# Patient Record
Sex: MALE | Race: WHITE | Employment: UNEMPLOYED | URBAN - METROPOLITAN AREA
[De-identification: names, ages, dates, MRNs, and addresses within clinical notes are randomized per-mention and may not be internally consistent; named-entity substitution may affect disease eponyms.]

---

## 2019-01-09 ENCOUNTER — HOSPITAL ENCOUNTER (INPATIENT)
Age: 44
LOS: 4 days | Discharge: HOME | DRG: 885 | End: 2019-01-14
Attending: PSYCHIATRY & NEUROLOGY | Admitting: PSYCHIATRY & NEUROLOGY

## 2019-01-09 DIAGNOSIS — F29 PSYCHOSIS, UNSPECIFIED PSYCHOSIS TYPE (HCC): Primary | ICD-10-CM

## 2019-01-09 LAB
ACETAMINOPHEN LEVEL: <5 UG/ML (ref 10–30)
ALBUMIN SERPL-MCNC: 4.9 G/DL (ref 3.9–4.9)
ALP BLD-CCNC: 67 U/L (ref 35–104)
ALT SERPL-CCNC: 29 U/L (ref 0–41)
AMPHETAMINE SCREEN, URINE: NORMAL
ANION GAP SERPL CALCULATED.3IONS-SCNC: 29 MEQ/L (ref 7–13)
AST SERPL-CCNC: 27 U/L (ref 0–40)
BARBITURATE SCREEN URINE: NORMAL
BASOPHILS ABSOLUTE: 0.1 K/UL (ref 0–0.2)
BASOPHILS RELATIVE PERCENT: 1 %
BENZODIAZEPINE SCREEN, URINE: NORMAL
BILIRUB SERPL-MCNC: 0.6 MG/DL (ref 0–1.2)
BILIRUBIN URINE: NEGATIVE
BLOOD, URINE: NEGATIVE
BUN BLDV-MCNC: 19 MG/DL (ref 6–20)
CALCIUM SERPL-MCNC: 9.7 MG/DL (ref 8.6–10.2)
CANNABINOID SCREEN URINE: NORMAL
CHLORIDE BLD-SCNC: 94 MEQ/L (ref 98–107)
CLARITY: CLEAR
CO2: 15 MEQ/L (ref 22–29)
COCAINE METABOLITE SCREEN URINE: NORMAL
COLOR: YELLOW
CREAT SERPL-MCNC: 1.22 MG/DL (ref 0.7–1.2)
EOSINOPHILS ABSOLUTE: 0 K/UL (ref 0–0.7)
EOSINOPHILS RELATIVE PERCENT: 0 %
ETHANOL PERCENT: NORMAL G/DL
ETHANOL: <10 MG/DL (ref 0–0.08)
GFR AFRICAN AMERICAN: >60
GFR NON-AFRICAN AMERICAN: >60
GLOBULIN: 2.9 G/DL (ref 2.3–3.5)
GLUCOSE BLD-MCNC: 59 MG/DL (ref 74–109)
GLUCOSE BLD-MCNC: 91 MG/DL (ref 60–115)
GLUCOSE URINE: NEGATIVE MG/DL
HCT VFR BLD CALC: 42.4 % (ref 42–52)
HEMOGLOBIN: 14.3 G/DL (ref 14–18)
KETONES, URINE: 40 MG/DL
LEUKOCYTE ESTERASE, URINE: NEGATIVE
LYMPHOCYTES ABSOLUTE: 1.2 K/UL (ref 1–4.8)
LYMPHOCYTES RELATIVE PERCENT: 9.6 %
Lab: NORMAL
MCH RBC QN AUTO: 34.5 PG (ref 27–31.3)
MCHC RBC AUTO-ENTMCNC: 33.6 % (ref 33–37)
MCV RBC AUTO: 102.7 FL (ref 80–100)
MONOCYTES ABSOLUTE: 1.5 K/UL (ref 0.2–0.8)
MONOCYTES RELATIVE PERCENT: 11.8 %
NEUTROPHILS ABSOLUTE: 9.5 K/UL (ref 1.4–6.5)
NEUTROPHILS RELATIVE PERCENT: 77.6 %
NITRITE, URINE: NEGATIVE
OPIATE SCREEN URINE: NORMAL
PDW BLD-RTO: 13.7 % (ref 11.5–14.5)
PERFORMED ON: NORMAL
PH UA: 5 (ref 5–9)
PHENCYCLIDINE SCREEN URINE: NORMAL
PLATELET # BLD: 304 K/UL (ref 130–400)
POTASSIUM SERPL-SCNC: 5.1 MEQ/L (ref 3.5–5.1)
PROTEIN UA: NEGATIVE MG/DL
RBC # BLD: 4.13 M/UL (ref 4.7–6.1)
SALICYLATE, SERUM: <0.3 MG/DL (ref 15–30)
SODIUM BLD-SCNC: 138 MEQ/L (ref 132–144)
SPECIFIC GRAVITY UA: 1.02 (ref 1–1.03)
TOTAL CK: 188 U/L (ref 0–190)
TOTAL PROTEIN: 7.8 G/DL (ref 6.4–8.1)
TSH SERPL DL<=0.05 MIU/L-ACNC: 1.28 UIU/ML (ref 0.27–4.2)
URINE REFLEX TO CULTURE: ABNORMAL
UROBILINOGEN, URINE: 0.2 E.U./DL
WBC # BLD: 12.3 K/UL (ref 4.8–10.8)

## 2019-01-09 PROCEDURE — 99285 EMERGENCY DEPT VISIT HI MDM: CPT

## 2019-01-09 PROCEDURE — 36415 COLL VENOUS BLD VENIPUNCTURE: CPT

## 2019-01-09 PROCEDURE — 84443 ASSAY THYROID STIM HORMONE: CPT

## 2019-01-09 PROCEDURE — 81003 URINALYSIS AUTO W/O SCOPE: CPT

## 2019-01-09 PROCEDURE — 80307 DRUG TEST PRSMV CHEM ANLYZR: CPT

## 2019-01-09 PROCEDURE — G0480 DRUG TEST DEF 1-7 CLASSES: HCPCS

## 2019-01-09 PROCEDURE — 85025 COMPLETE CBC W/AUTO DIFF WBC: CPT

## 2019-01-09 PROCEDURE — 6370000000 HC RX 637 (ALT 250 FOR IP): Performed by: NURSE PRACTITIONER

## 2019-01-09 PROCEDURE — 82550 ASSAY OF CK (CPK): CPT

## 2019-01-09 PROCEDURE — 80053 COMPREHEN METABOLIC PANEL: CPT

## 2019-01-09 RX ORDER — LORAZEPAM 1 MG/1
2 TABLET ORAL ONCE
Status: COMPLETED | OUTPATIENT
Start: 2019-01-09 | End: 2019-01-09

## 2019-01-09 RX ADMIN — LORAZEPAM 2 MG: 1 TABLET ORAL at 19:00

## 2019-01-09 ASSESSMENT — ENCOUNTER SYMPTOMS
SORE THROAT: 0
RHINORRHEA: 0
SHORTNESS OF BREATH: 0
DIARRHEA: 0
COLOR CHANGE: 0
VOMITING: 0
EYE REDNESS: 0
WHEEZING: 0
EYE DISCHARGE: 0
COUGH: 0
EYE PAIN: 0
BACK PAIN: 0
CHEST TIGHTNESS: 0
EYE ITCHING: 0
ABDOMINAL PAIN: 0
TROUBLE SWALLOWING: 0
NAUSEA: 0

## 2019-01-10 ENCOUNTER — APPOINTMENT (OUTPATIENT)
Dept: CT IMAGING | Age: 44
DRG: 885 | End: 2019-01-10

## 2019-01-10 PROBLEM — F29 PSYCHOSIS (HCC): Status: ACTIVE | Noted: 2019-01-10

## 2019-01-10 PROCEDURE — 96372 THER/PROPH/DIAG INJ SC/IM: CPT

## 2019-01-10 PROCEDURE — 1240000000 HC EMOTIONAL WELLNESS R&B

## 2019-01-10 PROCEDURE — 6360000002 HC RX W HCPCS

## 2019-01-10 PROCEDURE — 6370000000 HC RX 637 (ALT 250 FOR IP): Performed by: PSYCHIATRY & NEUROLOGY

## 2019-01-10 PROCEDURE — 6370000000 HC RX 637 (ALT 250 FOR IP): Performed by: NURSE PRACTITIONER

## 2019-01-10 PROCEDURE — 99223 1ST HOSP IP/OBS HIGH 75: CPT | Performed by: PSYCHIATRY & NEUROLOGY

## 2019-01-10 PROCEDURE — 70450 CT HEAD/BRAIN W/O DYE: CPT

## 2019-01-10 RX ORDER — CHLORDIAZEPOXIDE HYDROCHLORIDE 10 MG/1
10 CAPSULE, GELATIN COATED ORAL EVERY 4 HOURS PRN
Status: DISCONTINUED | OUTPATIENT
Start: 2019-01-10 | End: 2019-01-11

## 2019-01-10 RX ORDER — THIAMINE MONONITRATE (VIT B1) 100 MG
100 TABLET ORAL DAILY
Status: DISCONTINUED | OUTPATIENT
Start: 2019-01-10 | End: 2019-01-14 | Stop reason: HOSPADM

## 2019-01-10 RX ORDER — HALOPERIDOL 5 MG/ML
5 INJECTION INTRAMUSCULAR EVERY 6 HOURS PRN
Status: DISCONTINUED | OUTPATIENT
Start: 2019-01-10 | End: 2019-01-14 | Stop reason: HOSPADM

## 2019-01-10 RX ORDER — LORAZEPAM 2 MG/ML
2 INJECTION INTRAMUSCULAR ONCE
Status: COMPLETED | OUTPATIENT
Start: 2019-01-10 | End: 2019-01-10

## 2019-01-10 RX ORDER — MULTIVITAMIN WITH FOLIC ACID 400 MCG
1 TABLET ORAL DAILY
Status: DISCONTINUED | OUTPATIENT
Start: 2019-01-10 | End: 2019-01-14 | Stop reason: HOSPADM

## 2019-01-10 RX ORDER — TRAZODONE HYDROCHLORIDE 50 MG/1
50 TABLET ORAL NIGHTLY PRN
Status: DISCONTINUED | OUTPATIENT
Start: 2019-01-10 | End: 2019-01-14 | Stop reason: HOSPADM

## 2019-01-10 RX ORDER — HYDROXYZINE HYDROCHLORIDE 50 MG/ML
50 INJECTION, SOLUTION INTRAMUSCULAR EVERY 6 HOURS PRN
Status: DISCONTINUED | OUTPATIENT
Start: 2019-01-10 | End: 2019-01-14 | Stop reason: HOSPADM

## 2019-01-10 RX ORDER — CHLORDIAZEPOXIDE HYDROCHLORIDE 25 MG/1
25 CAPSULE, GELATIN COATED ORAL EVERY 4 HOURS PRN
Status: DISCONTINUED | OUTPATIENT
Start: 2019-01-10 | End: 2019-01-11

## 2019-01-10 RX ORDER — CHLORDIAZEPOXIDE HYDROCHLORIDE 25 MG/1
50 CAPSULE, GELATIN COATED ORAL
Status: DISCONTINUED | OUTPATIENT
Start: 2019-01-10 | End: 2019-01-11

## 2019-01-10 RX ORDER — ACETAMINOPHEN 80 MG
TABLET,CHEWABLE ORAL ONCE
Status: COMPLETED | OUTPATIENT
Start: 2019-01-10 | End: 2019-01-10

## 2019-01-10 RX ORDER — CHLORDIAZEPOXIDE HYDROCHLORIDE 25 MG/1
75 CAPSULE, GELATIN COATED ORAL
Status: DISCONTINUED | OUTPATIENT
Start: 2019-01-10 | End: 2019-01-11

## 2019-01-10 RX ORDER — NICOTINE 21 MG/24HR
1 PATCH, TRANSDERMAL 24 HOURS TRANSDERMAL DAILY
Status: DISCONTINUED | OUTPATIENT
Start: 2019-01-10 | End: 2019-01-14 | Stop reason: HOSPADM

## 2019-01-10 RX ORDER — QUETIAPINE FUMARATE 50 MG/1
50 TABLET, FILM COATED ORAL NIGHTLY
Status: DISCONTINUED | OUTPATIENT
Start: 2019-01-10 | End: 2019-01-11

## 2019-01-10 RX ORDER — LORAZEPAM 2 MG/ML
INJECTION INTRAMUSCULAR
Status: COMPLETED
Start: 2019-01-10 | End: 2019-01-10

## 2019-01-10 RX ORDER — HYDROXYZINE PAMOATE 50 MG/1
50 CAPSULE ORAL EVERY 6 HOURS PRN
Status: DISCONTINUED | OUTPATIENT
Start: 2019-01-10 | End: 2019-01-14 | Stop reason: HOSPADM

## 2019-01-10 RX ORDER — FOLIC ACID 1 MG/1
1 TABLET ORAL DAILY
Status: DISCONTINUED | OUTPATIENT
Start: 2019-01-10 | End: 2019-01-14 | Stop reason: HOSPADM

## 2019-01-10 RX ORDER — SODIUM CHLORIDE 0.9 % (FLUSH) 0.9 %
10 SYRINGE (ML) INJECTION EVERY 12 HOURS SCHEDULED
Status: DISCONTINUED | OUTPATIENT
Start: 2019-01-10 | End: 2019-01-10

## 2019-01-10 RX ORDER — SODIUM CHLORIDE 0.9 % (FLUSH) 0.9 %
10 SYRINGE (ML) INJECTION PRN
Status: DISCONTINUED | OUTPATIENT
Start: 2019-01-10 | End: 2019-01-10

## 2019-01-10 RX ORDER — LORAZEPAM 1 MG/1
4 TABLET ORAL
Status: DISCONTINUED | OUTPATIENT
Start: 2019-01-10 | End: 2019-01-11

## 2019-01-10 RX ORDER — HALOPERIDOL 5 MG
5 TABLET ORAL EVERY 6 HOURS PRN
Status: DISCONTINUED | OUTPATIENT
Start: 2019-01-10 | End: 2019-01-14 | Stop reason: HOSPADM

## 2019-01-10 RX ORDER — ONDANSETRON 2 MG/ML
4 INJECTION INTRAMUSCULAR; INTRAVENOUS EVERY 6 HOURS PRN
Status: DISCONTINUED | OUTPATIENT
Start: 2019-01-10 | End: 2019-01-14 | Stop reason: HOSPADM

## 2019-01-10 RX ADMIN — LORAZEPAM 2 MG: 2 INJECTION INTRAMUSCULAR at 04:57

## 2019-01-10 RX ADMIN — QUETIAPINE FUMARATE 50 MG: 50 TABLET ORAL at 20:38

## 2019-01-10 RX ADMIN — Medication: at 12:18

## 2019-01-10 RX ADMIN — CHLORDIAZEPOXIDE HYDROCHLORIDE 10 MG: 10 CAPSULE ORAL at 09:35

## 2019-01-10 RX ADMIN — LORAZEPAM 2 MG: 2 INJECTION, SOLUTION INTRAMUSCULAR; INTRAVENOUS at 04:57

## 2019-01-10 RX ADMIN — HYDROXYZINE PAMOATE 50 MG: 50 CAPSULE ORAL at 13:07

## 2019-01-10 RX ADMIN — METOPROLOL TARTRATE 12.5 MG: 25 TABLET ORAL at 20:42

## 2019-01-10 ASSESSMENT — LIFESTYLE VARIABLES: HISTORY_ALCOHOL_USE: NO

## 2019-01-10 ASSESSMENT — SLEEP AND FATIGUE QUESTIONNAIRES
SLEEP PATTERN: INSOMNIA
DO YOU USE A SLEEP AID: NO
AVERAGE NUMBER OF SLEEP HOURS: 0
DO YOU HAVE DIFFICULTY SLEEPING: YES

## 2019-01-10 ASSESSMENT — PATIENT HEALTH QUESTIONNAIRE - PHQ9: SUM OF ALL RESPONSES TO PHQ QUESTIONS 1-9: 9

## 2019-01-11 LAB
ANION GAP SERPL CALCULATED.3IONS-SCNC: 16 MEQ/L (ref 7–13)
BUN BLDV-MCNC: 10 MG/DL (ref 6–20)
CALCIUM SERPL-MCNC: 8.8 MG/DL (ref 8.6–10.2)
CHLORIDE BLD-SCNC: 96 MEQ/L (ref 98–107)
CO2: 23 MEQ/L (ref 22–29)
CREAT SERPL-MCNC: 0.8 MG/DL (ref 0.7–1.2)
GFR AFRICAN AMERICAN: >60
GFR NON-AFRICAN AMERICAN: >60
GLUCOSE BLD-MCNC: 82 MG/DL (ref 74–109)
HCT VFR BLD CALC: 41.5 % (ref 42–52)
HEMOGLOBIN: 14.6 G/DL (ref 14–18)
MCH RBC QN AUTO: 35.7 PG (ref 27–31.3)
MCHC RBC AUTO-ENTMCNC: 35.2 % (ref 33–37)
MCV RBC AUTO: 101.4 FL (ref 80–100)
PDW BLD-RTO: 13.6 % (ref 11.5–14.5)
PLATELET # BLD: 284 K/UL (ref 130–400)
POTASSIUM SERPL-SCNC: 5 MEQ/L (ref 3.5–5.1)
RBC # BLD: 4.1 M/UL (ref 4.7–6.1)
SODIUM BLD-SCNC: 135 MEQ/L (ref 132–144)
WBC # BLD: 7.1 K/UL (ref 4.8–10.8)

## 2019-01-11 PROCEDURE — 36415 COLL VENOUS BLD VENIPUNCTURE: CPT

## 2019-01-11 PROCEDURE — 80048 BASIC METABOLIC PNL TOTAL CA: CPT

## 2019-01-11 PROCEDURE — 6370000000 HC RX 637 (ALT 250 FOR IP): Performed by: PSYCHIATRY & NEUROLOGY

## 2019-01-11 PROCEDURE — 85027 COMPLETE CBC AUTOMATED: CPT

## 2019-01-11 PROCEDURE — 6370000000 HC RX 637 (ALT 250 FOR IP): Performed by: NURSE PRACTITIONER

## 2019-01-11 PROCEDURE — 99232 SBSQ HOSP IP/OBS MODERATE 35: CPT | Performed by: PSYCHIATRY & NEUROLOGY

## 2019-01-11 PROCEDURE — 1240000000 HC EMOTIONAL WELLNESS R&B

## 2019-01-11 RX ORDER — QUETIAPINE FUMARATE 100 MG/1
100 TABLET, FILM COATED ORAL NIGHTLY
Status: DISCONTINUED | OUTPATIENT
Start: 2019-01-11 | End: 2019-01-14 | Stop reason: HOSPADM

## 2019-01-11 RX ADMIN — FOLIC ACID 1 MG: 1 TABLET ORAL at 10:04

## 2019-01-11 RX ADMIN — Medication 100 MG: at 10:04

## 2019-01-11 RX ADMIN — METOPROLOL TARTRATE 12.5 MG: 25 TABLET ORAL at 10:05

## 2019-01-11 RX ADMIN — QUETIAPINE FUMARATE 100 MG: 100 TABLET ORAL at 21:29

## 2019-01-11 RX ADMIN — METOPROLOL TARTRATE 12.5 MG: 25 TABLET ORAL at 21:28

## 2019-01-11 RX ADMIN — TRAZODONE HYDROCHLORIDE 50 MG: 50 TABLET ORAL at 21:29

## 2019-01-11 RX ADMIN — THERA TABS 1 TABLET: TAB at 10:05

## 2019-01-12 LAB
EKG ATRIAL RATE: 82 BPM
EKG P AXIS: 69 DEGREES
EKG P-R INTERVAL: 108 MS
EKG Q-T INTERVAL: 374 MS
EKG QRS DURATION: 90 MS
EKG QTC CALCULATION (BAZETT): 436 MS
EKG R AXIS: 23 DEGREES
EKG T AXIS: 34 DEGREES
EKG VENTRICULAR RATE: 82 BPM

## 2019-01-12 PROCEDURE — 93005 ELECTROCARDIOGRAM TRACING: CPT

## 2019-01-12 PROCEDURE — 6370000000 HC RX 637 (ALT 250 FOR IP): Performed by: NURSE PRACTITIONER

## 2019-01-12 PROCEDURE — 6370000000 HC RX 637 (ALT 250 FOR IP): Performed by: PSYCHIATRY & NEUROLOGY

## 2019-01-12 PROCEDURE — 1240000000 HC EMOTIONAL WELLNESS R&B

## 2019-01-12 RX ADMIN — THERA TABS 1 TABLET: TAB at 10:03

## 2019-01-12 RX ADMIN — METOPROLOL TARTRATE 12.5 MG: 25 TABLET ORAL at 10:04

## 2019-01-12 RX ADMIN — METOPROLOL TARTRATE 12.5 MG: 25 TABLET ORAL at 21:48

## 2019-01-12 RX ADMIN — Medication 100 MG: at 10:04

## 2019-01-12 RX ADMIN — FOLIC ACID 1 MG: 1 TABLET ORAL at 10:04

## 2019-01-12 RX ADMIN — QUETIAPINE FUMARATE 100 MG: 100 TABLET ORAL at 21:49

## 2019-01-13 PROCEDURE — 6370000000 HC RX 637 (ALT 250 FOR IP): Performed by: PSYCHIATRY & NEUROLOGY

## 2019-01-13 PROCEDURE — 1240000000 HC EMOTIONAL WELLNESS R&B

## 2019-01-13 PROCEDURE — 6370000000 HC RX 637 (ALT 250 FOR IP): Performed by: NURSE PRACTITIONER

## 2019-01-13 RX ADMIN — FOLIC ACID 1 MG: 1 TABLET ORAL at 09:06

## 2019-01-13 RX ADMIN — QUETIAPINE FUMARATE 100 MG: 100 TABLET ORAL at 21:01

## 2019-01-13 RX ADMIN — THERA TABS 1 TABLET: TAB at 09:04

## 2019-01-13 RX ADMIN — Medication 100 MG: at 09:03

## 2019-01-13 RX ADMIN — TRAZODONE HYDROCHLORIDE 50 MG: 50 TABLET ORAL at 21:01

## 2019-01-13 RX ADMIN — METOPROLOL TARTRATE 12.5 MG: 25 TABLET ORAL at 21:01

## 2019-01-13 RX ADMIN — METOPROLOL TARTRATE 12.5 MG: 25 TABLET ORAL at 09:04

## 2019-01-14 VITALS
HEART RATE: 93 BPM | TEMPERATURE: 99 F | HEIGHT: 69 IN | SYSTOLIC BLOOD PRESSURE: 138 MMHG | DIASTOLIC BLOOD PRESSURE: 97 MMHG | OXYGEN SATURATION: 100 % | RESPIRATION RATE: 18 BRPM | BODY MASS INDEX: 23.7 KG/M2 | WEIGHT: 160 LBS

## 2019-01-14 PROCEDURE — 6370000000 HC RX 637 (ALT 250 FOR IP): Performed by: NURSE PRACTITIONER

## 2019-01-14 PROCEDURE — 99239 HOSP IP/OBS DSCHRG MGMT >30: CPT | Performed by: PSYCHIATRY & NEUROLOGY

## 2019-01-14 PROCEDURE — 93010 ELECTROCARDIOGRAM REPORT: CPT | Performed by: INTERNAL MEDICINE

## 2019-01-14 PROCEDURE — 6370000000 HC RX 637 (ALT 250 FOR IP): Performed by: PSYCHIATRY & NEUROLOGY

## 2019-01-14 RX ORDER — QUETIAPINE FUMARATE 100 MG/1
100 TABLET, FILM COATED ORAL NIGHTLY
Qty: 30 TABLET | Refills: 1 | Status: ON HOLD | OUTPATIENT
Start: 2019-01-14 | End: 2019-10-01 | Stop reason: HOSPADM

## 2019-01-14 RX ORDER — QUETIAPINE FUMARATE 100 MG/1
100 TABLET, FILM COATED ORAL NIGHTLY
Qty: 14 TABLET | Refills: 0 | Status: SHIPPED | OUTPATIENT
Start: 2019-01-14 | End: 2019-01-14

## 2019-01-14 RX ADMIN — THERA TABS 1 TABLET: TAB at 09:55

## 2019-01-14 RX ADMIN — Medication 100 MG: at 09:55

## 2019-01-14 RX ADMIN — METOPROLOL TARTRATE 12.5 MG: 25 TABLET ORAL at 09:55

## 2019-01-14 RX ADMIN — FOLIC ACID 1 MG: 1 TABLET ORAL at 09:55

## 2019-07-25 ENCOUNTER — APPOINTMENT (OUTPATIENT)
Dept: CT IMAGING | Age: 44
DRG: 207 | End: 2019-07-25

## 2019-07-25 ENCOUNTER — APPOINTMENT (OUTPATIENT)
Dept: GENERAL RADIOLOGY | Age: 44
DRG: 207 | End: 2019-07-25

## 2019-07-25 ENCOUNTER — HOSPITAL ENCOUNTER (INPATIENT)
Age: 44
LOS: 8 days | Discharge: HOME | DRG: 207 | End: 2019-08-02
Attending: STUDENT IN AN ORGANIZED HEALTH CARE EDUCATION/TRAINING PROGRAM | Admitting: INTERNAL MEDICINE

## 2019-07-25 DIAGNOSIS — F10.20 CHRONIC ALCOHOLISM (HCC): ICD-10-CM

## 2019-07-25 DIAGNOSIS — R45.851 SUICIDAL IDEATION: ICD-10-CM

## 2019-07-25 DIAGNOSIS — R40.2432 GLASGOW COMA SCALE TOTAL SCORE 3-8, AT ARRIVAL TO EMERGENCY DEPARTMENT (HCC): Primary | ICD-10-CM

## 2019-07-25 DIAGNOSIS — S00.81XA FACIAL ABRASION, INITIAL ENCOUNTER: ICD-10-CM

## 2019-07-25 DIAGNOSIS — F10.929 ACUTE ALCOHOLIC INTOXICATION WITH COMPLICATION (HCC): ICD-10-CM

## 2019-07-25 DIAGNOSIS — R45.850 HOMICIDAL IDEATION: ICD-10-CM
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
## 2019-07-25 DIAGNOSIS — S09.90XA CLOSED HEAD INJURY, INITIAL ENCOUNTER: ICD-10-CM

## 2019-07-25 PROBLEM — F10.229 ALCOHOL INTOXICATION IN ALCOHOLISM WITH BLOOD LEVEL OVER 0.3 WITH COMPLICATION (HCC): Status: ACTIVE | Noted: 2019-07-25

## 2019-07-25 LAB
ACETAMINOPHEN LEVEL: <5 UG/ML (ref 10–30)
ALBUMIN SERPL-MCNC: 4 G/DL (ref 3.5–4.6)
ALP BLD-CCNC: 81 U/L (ref 35–104)
ALT SERPL-CCNC: 9 U/L (ref 0–41)
AMPHETAMINE SCREEN, URINE: NORMAL
ANION GAP SERPL CALCULATED.3IONS-SCNC: 14 MEQ/L (ref 9–15)
ANION GAP SERPL CALCULATED.3IONS-SCNC: 14 MEQ/L (ref 9–15)
APTT: 28 SEC (ref 24.4–36.8)
AST SERPL-CCNC: 16 U/L (ref 0–40)
BARBITURATE SCREEN URINE: NORMAL
BASE EXCESS ARTERIAL: -1 (ref -3–3)
BASOPHILS ABSOLUTE: 0.1 K/UL (ref 0–0.2)
BASOPHILS ABSOLUTE: 0.1 K/UL (ref 0–0.2)
BASOPHILS RELATIVE PERCENT: 0.7 %
BASOPHILS RELATIVE PERCENT: 1.1 %
BENZODIAZEPINE SCREEN, URINE: NORMAL
BILIRUB SERPL-MCNC: <0.2 MG/DL (ref 0.2–0.7)
BILIRUBIN URINE: NEGATIVE
BLOOD, URINE: NEGATIVE
BUN BLDV-MCNC: 8 MG/DL (ref 6–20)
BUN BLDV-MCNC: 9 MG/DL (ref 6–20)
CALCIUM IONIZED: 1.04 MMOL/L (ref 1.12–1.32)
CALCIUM SERPL-MCNC: 7.8 MG/DL (ref 8.5–9.9)
CALCIUM SERPL-MCNC: 8.3 MG/DL (ref 8.5–9.9)
CANNABINOID SCREEN URINE: NORMAL
CHLORIDE BLD-SCNC: 108 MEQ/L (ref 95–107)
CHLORIDE BLD-SCNC: 108 MEQ/L (ref 95–107)
CK MB: 2.8 NG/ML (ref 0–6.7)
CLARITY: CLEAR
CO2: 25 MEQ/L (ref 20–31)
CO2: 26 MEQ/L (ref 20–31)
COCAINE METABOLITE SCREEN URINE: NORMAL
COLOR: YELLOW
CREAT SERPL-MCNC: 0.7 MG/DL (ref 0.7–1.2)
CREAT SERPL-MCNC: 0.76 MG/DL (ref 0.7–1.2)
CREATINE KINASE-MB INDEX: 2.9 % (ref 0–3.5)
EKG ATRIAL RATE: 94 BPM
EKG P AXIS: 75 DEGREES
EKG P-R INTERVAL: 114 MS
EKG Q-T INTERVAL: 376 MS
EKG QRS DURATION: 92 MS
EKG QTC CALCULATION (BAZETT): 470 MS
EKG R AXIS: 19 DEGREES
EKG T AXIS: 31 DEGREES
EKG VENTRICULAR RATE: 94 BPM
EOSINOPHILS ABSOLUTE: 0.1 K/UL (ref 0–0.7)
EOSINOPHILS ABSOLUTE: 0.1 K/UL (ref 0–0.7)
EOSINOPHILS RELATIVE PERCENT: 1.3 %
EOSINOPHILS RELATIVE PERCENT: 1.8 %
ETHANOL PERCENT: 0.36 G/DL
ETHANOL: 411 MG/DL (ref 0–0.08)
GFR AFRICAN AMERICAN: >60
GFR NON-AFRICAN AMERICAN: >60
GLOBULIN: 2.6 G/DL (ref 2.3–3.5)
GLUCOSE BLD-MCNC: 113 MG/DL (ref 70–99)
GLUCOSE BLD-MCNC: 273 MG/DL (ref 60–115)
GLUCOSE BLD-MCNC: 84 MG/DL (ref 70–99)
GLUCOSE URINE: NEGATIVE MG/DL
HCO3 ARTERIAL: 26 MMOL/L (ref 21–29)
HCT VFR BLD CALC: 43.9 % (ref 42–52)
HCT VFR BLD CALC: 44.5 % (ref 42–52)
HEMOGLOBIN: 14.4 GM/DL (ref 13.5–17.5)
HEMOGLOBIN: 15.1 G/DL (ref 14–18)
HEMOGLOBIN: 15.2 G/DL (ref 14–18)
INR BLD: 0.9
KETONES, URINE: NEGATIVE MG/DL
LACTATE: 1.3 MMOL/L (ref 0.4–2)
LACTIC ACID: 1.7 MMOL/L (ref 0.5–2.2)
LEUKOCYTE ESTERASE, URINE: NEGATIVE
LYMPHOCYTES ABSOLUTE: 1.8 K/UL (ref 1–4.8)
LYMPHOCYTES ABSOLUTE: 2.5 K/UL (ref 1–4.8)
LYMPHOCYTES RELATIVE PERCENT: 19.7 %
LYMPHOCYTES RELATIVE PERCENT: 34 %
Lab: NORMAL
MAGNESIUM: 2.2 MG/DL (ref 1.7–2.4)
MCH RBC QN AUTO: 34.4 PG (ref 27–31.3)
MCH RBC QN AUTO: 34.5 PG (ref 27–31.3)
MCHC RBC AUTO-ENTMCNC: 34.1 % (ref 33–37)
MCHC RBC AUTO-ENTMCNC: 34.4 % (ref 33–37)
MCV RBC AUTO: 100.2 FL (ref 80–100)
MCV RBC AUTO: 100.7 FL (ref 80–100)
MONOCYTES ABSOLUTE: 0.6 K/UL (ref 0.2–0.8)
MONOCYTES ABSOLUTE: 0.7 K/UL (ref 0.2–0.8)
MONOCYTES RELATIVE PERCENT: 6.7 %
MONOCYTES RELATIVE PERCENT: 9 %
NEUTROPHILS ABSOLUTE: 4 K/UL (ref 1.4–6.5)
NEUTROPHILS ABSOLUTE: 6.7 K/UL (ref 1.4–6.5)
NEUTROPHILS RELATIVE PERCENT: 54.1 %
NEUTROPHILS RELATIVE PERCENT: 71.6 %
NITRITE, URINE: NEGATIVE
O2 SAT, ARTERIAL: 100 % (ref 93–100)
OPIATE SCREEN URINE: NORMAL
PCO2 ARTERIAL: 57 MM HG (ref 35–45)
PDW BLD-RTO: 13.9 % (ref 11.5–14.5)
PDW BLD-RTO: 14.1 % (ref 11.5–14.5)
PERFORMED ON: ABNORMAL
PH ARTERIAL: 7.27 (ref 7.35–7.45)
PH UA: 5 (ref 5–9)
PHENCYCLIDINE SCREEN URINE: NORMAL
PLATELET # BLD: 235 K/UL (ref 130–400)
PLATELET # BLD: 258 K/UL (ref 130–400)
PO2 ARTERIAL: 242 MM HG (ref 75–108)
POC CHLORIDE: 115 MEQ/L (ref 99–110)
POC CREATININE: 0.7 MG/DL (ref 0.9–1.3)
POC FIO2: 50
POC HEMATOCRIT: 42 % (ref 41–53)
POC POTASSIUM: 3.7 MEQ/L (ref 3.5–5.1)
POC SAMPLE TYPE: ABNORMAL
POC SODIUM: 143 MEQ/L (ref 136–145)
POTASSIUM SERPL-SCNC: 4 MEQ/L (ref 3.4–4.9)
POTASSIUM SERPL-SCNC: 4.4 MEQ/L (ref 3.4–4.9)
PROTEIN UA: NEGATIVE MG/DL
PROTHROMBIN TIME: 12.2 SEC (ref 12.3–14.9)
RBC # BLD: 4.38 M/UL (ref 4.7–6.1)
RBC # BLD: 4.42 M/UL (ref 4.7–6.1)
SALICYLATE, SERUM: <0.3 MG/DL (ref 15–30)
SODIUM BLD-SCNC: 147 MEQ/L (ref 135–144)
SODIUM BLD-SCNC: 148 MEQ/L (ref 135–144)
SPECIFIC GRAVITY UA: 1 (ref 1–1.03)
TCO2 ARTERIAL: 28 (ref 22–29)
TOTAL CK: 96 U/L (ref 0–190)
TOTAL PROTEIN: 6.6 G/DL (ref 6.3–8)
TROPONIN: <0.01 NG/ML (ref 0–0.01)
TSH SERPL DL<=0.05 MIU/L-ACNC: 1.16 UIU/ML (ref 0.44–3.86)
URINE REFLEX TO CULTURE: NORMAL
UROBILINOGEN, URINE: 0.2 E.U./DL
WBC # BLD: 7.4 K/UL (ref 4.8–10.8)
WBC # BLD: 9.3 K/UL (ref 4.8–10.8)

## 2019-07-25 PROCEDURE — 85014 HEMATOCRIT: CPT

## 2019-07-25 PROCEDURE — 84443 ASSAY THYROID STIM HORMONE: CPT

## 2019-07-25 PROCEDURE — 96365 THER/PROPH/DIAG IV INF INIT: CPT

## 2019-07-25 PROCEDURE — 71045 X-RAY EXAM CHEST 1 VIEW: CPT

## 2019-07-25 PROCEDURE — 6360000002 HC RX W HCPCS: Performed by: INTERNAL MEDICINE

## 2019-07-25 PROCEDURE — 6360000002 HC RX W HCPCS: Performed by: STUDENT IN AN ORGANIZED HEALTH CARE EDUCATION/TRAINING PROGRAM

## 2019-07-25 PROCEDURE — 84484 ASSAY OF TROPONIN QUANT: CPT

## 2019-07-25 PROCEDURE — 72131 CT LUMBAR SPINE W/O DYE: CPT

## 2019-07-25 PROCEDURE — 72125 CT NECK SPINE W/O DYE: CPT

## 2019-07-25 PROCEDURE — 99152 MOD SED SAME PHYS/QHP 5/>YRS: CPT

## 2019-07-25 PROCEDURE — 85610 PROTHROMBIN TIME: CPT

## 2019-07-25 PROCEDURE — 81003 URINALYSIS AUTO W/O SCOPE: CPT

## 2019-07-25 PROCEDURE — 99285 EMERGENCY DEPT VISIT HI MDM: CPT

## 2019-07-25 PROCEDURE — 2580000003 HC RX 258: Performed by: STUDENT IN AN ORGANIZED HEALTH CARE EDUCATION/TRAINING PROGRAM

## 2019-07-25 PROCEDURE — 99255 IP/OBS CONSLTJ NEW/EST HI 80: CPT | Performed by: PHYSICIAN ASSISTANT

## 2019-07-25 PROCEDURE — 72128 CT CHEST SPINE W/O DYE: CPT

## 2019-07-25 PROCEDURE — 82550 ASSAY OF CK (CPK): CPT

## 2019-07-25 PROCEDURE — 74177 CT ABD & PELVIS W/CONTRAST: CPT

## 2019-07-25 PROCEDURE — 31500 INSERT EMERGENCY AIRWAY: CPT

## 2019-07-25 PROCEDURE — 80053 COMPREHEN METABOLIC PANEL: CPT

## 2019-07-25 PROCEDURE — 5A1955Z RESPIRATORY VENTILATION, GREATER THAN 96 CONSECUTIVE HOURS: ICD-10-PCS | Performed by: STUDENT IN AN ORGANIZED HEALTH CARE EDUCATION/TRAINING PROGRAM

## 2019-07-25 PROCEDURE — 36600 WITHDRAWAL OF ARTERIAL BLOOD: CPT

## 2019-07-25 PROCEDURE — 83735 ASSAY OF MAGNESIUM: CPT

## 2019-07-25 PROCEDURE — 0BH17EZ INSERTION OF ENDOTRACHEAL AIRWAY INTO TRACHEA, VIA NATURAL OR ARTIFICIAL OPENING: ICD-10-PCS | Performed by: STUDENT IN AN ORGANIZED HEALTH CARE EDUCATION/TRAINING PROGRAM

## 2019-07-25 PROCEDURE — 82565 ASSAY OF CREATININE: CPT

## 2019-07-25 PROCEDURE — 6360000002 HC RX W HCPCS: Performed by: NURSE PRACTITIONER

## 2019-07-25 PROCEDURE — 82435 ASSAY OF BLOOD CHLORIDE: CPT

## 2019-07-25 PROCEDURE — 70486 CT MAXILLOFACIAL W/O DYE: CPT

## 2019-07-25 PROCEDURE — 93005 ELECTROCARDIOGRAM TRACING: CPT | Performed by: STUDENT IN AN ORGANIZED HEALTH CARE EDUCATION/TRAINING PROGRAM

## 2019-07-25 PROCEDURE — 82330 ASSAY OF CALCIUM: CPT

## 2019-07-25 PROCEDURE — 84132 ASSAY OF SERUM POTASSIUM: CPT

## 2019-07-25 PROCEDURE — 83605 ASSAY OF LACTIC ACID: CPT

## 2019-07-25 PROCEDURE — 85730 THROMBOPLASTIN TIME PARTIAL: CPT

## 2019-07-25 PROCEDURE — 2500000003 HC RX 250 WO HCPCS: Performed by: NURSE PRACTITIONER

## 2019-07-25 PROCEDURE — 84295 ASSAY OF SERUM SODIUM: CPT

## 2019-07-25 PROCEDURE — 96361 HYDRATE IV INFUSION ADD-ON: CPT

## 2019-07-25 PROCEDURE — 94002 VENT MGMT INPAT INIT DAY: CPT

## 2019-07-25 PROCEDURE — 70450 CT HEAD/BRAIN W/O DYE: CPT

## 2019-07-25 PROCEDURE — 85025 COMPLETE CBC W/AUTO DIFF WBC: CPT

## 2019-07-25 PROCEDURE — 82553 CREATINE MB FRACTION: CPT

## 2019-07-25 PROCEDURE — 6370000000 HC RX 637 (ALT 250 FOR IP): Performed by: INTERNAL MEDICINE

## 2019-07-25 PROCEDURE — 80307 DRUG TEST PRSMV CHEM ANLYZR: CPT

## 2019-07-25 PROCEDURE — 2500000003 HC RX 250 WO HCPCS: Performed by: STUDENT IN AN ORGANIZED HEALTH CARE EDUCATION/TRAINING PROGRAM

## 2019-07-25 PROCEDURE — G0480 DRUG TEST DEF 1-7 CLASSES: HCPCS

## 2019-07-25 PROCEDURE — 2000000000 HC ICU R&B

## 2019-07-25 PROCEDURE — 71260 CT THORAX DX C+: CPT

## 2019-07-25 PROCEDURE — 82803 BLOOD GASES ANY COMBINATION: CPT

## 2019-07-25 PROCEDURE — 2700000000 HC OXYGEN THERAPY PER DAY

## 2019-07-25 PROCEDURE — 6360000004 HC RX CONTRAST MEDICATION: Performed by: STUDENT IN AN ORGANIZED HEALTH CARE EDUCATION/TRAINING PROGRAM

## 2019-07-25 PROCEDURE — 2580000003 HC RX 258

## 2019-07-25 PROCEDURE — 2580000003 HC RX 258: Performed by: INTERNAL MEDICINE

## 2019-07-25 PROCEDURE — 51702 INSERT TEMP BLADDER CATH: CPT

## 2019-07-25 PROCEDURE — 36415 COLL VENOUS BLD VENIPUNCTURE: CPT

## 2019-07-25 PROCEDURE — 2500000003 HC RX 250 WO HCPCS: Performed by: INTERNAL MEDICINE

## 2019-07-25 RX ORDER — KETAMINE HYDROCHLORIDE 50 MG/ML
4 INJECTION, SOLUTION, CONCENTRATE INTRAMUSCULAR; INTRAVENOUS ONCE
Status: COMPLETED | OUTPATIENT
Start: 2019-07-25 | End: 2019-07-25

## 2019-07-25 RX ORDER — SODIUM CHLORIDE 0.9 % (FLUSH) 0.9 %
10 SYRINGE (ML) INJECTION EVERY 12 HOURS SCHEDULED
Status: DISCONTINUED | OUTPATIENT
Start: 2019-07-25 | End: 2019-07-26 | Stop reason: ALTCHOICE

## 2019-07-25 RX ORDER — POTASSIUM CHLORIDE 7.45 MG/ML
10 INJECTION INTRAVENOUS PRN
Status: DISCONTINUED | OUTPATIENT
Start: 2019-07-25 | End: 2019-08-02 | Stop reason: HOSPADM

## 2019-07-25 RX ORDER — PROPOFOL 10 MG/ML
10 INJECTION, EMULSION INTRAVENOUS ONCE
Status: COMPLETED | OUTPATIENT
Start: 2019-07-25 | End: 2019-07-25

## 2019-07-25 RX ORDER — POTASSIUM CHLORIDE 20 MEQ/1
40 TABLET, EXTENDED RELEASE ORAL PRN
Status: DISCONTINUED | OUTPATIENT
Start: 2019-07-25 | End: 2019-08-02 | Stop reason: HOSPADM

## 2019-07-25 RX ORDER — PROPOFOL 10 MG/ML
INJECTION, EMULSION INTRAVENOUS
Status: COMPLETED
Start: 2019-07-25 | End: 2019-07-26

## 2019-07-25 RX ORDER — SODIUM CHLORIDE 0.9 % (FLUSH) 0.9 %
10 SYRINGE (ML) INJECTION PRN
Status: DISCONTINUED | OUTPATIENT
Start: 2019-07-25 | End: 2019-07-26 | Stop reason: ALTCHOICE

## 2019-07-25 RX ORDER — CHLORHEXIDINE GLUCONATE 0.12 MG/ML
15 RINSE ORAL 2 TIMES DAILY
Status: DISCONTINUED | OUTPATIENT
Start: 2019-07-25 | End: 2019-08-02 | Stop reason: HOSPADM

## 2019-07-25 RX ORDER — PROPOFOL 10 MG/ML
10 INJECTION, EMULSION INTRAVENOUS
Status: DISCONTINUED | OUTPATIENT
Start: 2019-07-25 | End: 2019-07-30

## 2019-07-25 RX ORDER — PROPOFOL 10 MG/ML
200 INJECTION, EMULSION INTRAVENOUS ONCE
Status: COMPLETED | OUTPATIENT
Start: 2019-07-25 | End: 2019-07-25

## 2019-07-25 RX ORDER — SODIUM CHLORIDE 9 MG/ML
INJECTION, SOLUTION INTRAVENOUS
Status: COMPLETED
Start: 2019-07-25 | End: 2019-07-25

## 2019-07-25 RX ORDER — ONDANSETRON 2 MG/ML
4 INJECTION INTRAMUSCULAR; INTRAVENOUS EVERY 6 HOURS PRN
Status: DISCONTINUED | OUTPATIENT
Start: 2019-07-25 | End: 2019-08-02 | Stop reason: HOSPADM

## 2019-07-25 RX ORDER — ETOMIDATE 2 MG/ML
20 INJECTION INTRAVENOUS ONCE
Status: COMPLETED | OUTPATIENT
Start: 2019-07-25 | End: 2019-07-25

## 2019-07-25 RX ORDER — POTASSIUM CHLORIDE 1.5 G/1.77G
40 POWDER, FOR SOLUTION ORAL PRN
Status: DISCONTINUED | OUTPATIENT
Start: 2019-07-25 | End: 2019-08-02 | Stop reason: HOSPADM

## 2019-07-25 RX ORDER — ACETAMINOPHEN 650 MG/1
650 SUPPOSITORY RECTAL EVERY 4 HOURS PRN
Status: DISCONTINUED | OUTPATIENT
Start: 2019-07-25 | End: 2019-08-02 | Stop reason: HOSPADM

## 2019-07-25 RX ORDER — LABETALOL HYDROCHLORIDE 5 MG/ML
5 INJECTION, SOLUTION INTRAVENOUS EVERY 4 HOURS PRN
Status: DISCONTINUED | OUTPATIENT
Start: 2019-07-25 | End: 2019-07-27

## 2019-07-25 RX ORDER — ROCURONIUM BROMIDE 10 MG/ML
50 INJECTION, SOLUTION INTRAVENOUS ONCE
Status: COMPLETED | OUTPATIENT
Start: 2019-07-25 | End: 2019-07-25

## 2019-07-25 RX ADMIN — PROPOFOL 10 MCG/KG/MIN: 10 INJECTION, EMULSION INTRAVENOUS at 13:25

## 2019-07-25 RX ADMIN — PROPOFOL 200 MG: 10 INJECTION, EMULSION INTRAVENOUS at 13:16

## 2019-07-25 RX ADMIN — KETAMINE HYDROCHLORIDE 290 MG: 50 INJECTION, SOLUTION INTRAMUSCULAR; INTRAVENOUS at 13:26

## 2019-07-25 RX ADMIN — FAMOTIDINE 20 MG: 10 INJECTION, SOLUTION INTRAVENOUS at 20:40

## 2019-07-25 RX ADMIN — ENOXAPARIN SODIUM 40 MG: 40 INJECTION SUBCUTANEOUS at 20:44

## 2019-07-25 RX ADMIN — PROPOFOL 50 MCG/KG/MIN: 10 INJECTION, EMULSION INTRAVENOUS at 22:43

## 2019-07-25 RX ADMIN — CHLORHEXIDINE GLUCONATE 0.12% ORAL RINSE 15 ML: 1.2 LIQUID ORAL at 20:40

## 2019-07-25 RX ADMIN — ROCURONIUM BROMIDE 50 MG: 10 INJECTION INTRAVENOUS at 12:39

## 2019-07-25 RX ADMIN — DEXMEDETOMIDINE 0.2 MCG/KG/HR: 100 INJECTION, SOLUTION, CONCENTRATE INTRAVENOUS at 19:38

## 2019-07-25 RX ADMIN — SODIUM CHLORIDE: 9 INJECTION, SOLUTION INTRAVENOUS at 13:26

## 2019-07-25 RX ADMIN — ETOMIDATE 20 MG: 2 INJECTION, SOLUTION INTRAVENOUS at 12:39

## 2019-07-25 RX ADMIN — FOLIC ACID: 5 INJECTION, SOLUTION INTRAMUSCULAR; INTRAVENOUS; SUBCUTANEOUS at 13:58

## 2019-07-25 RX ADMIN — IOPAMIDOL 100 ML: 612 INJECTION, SOLUTION INTRAVENOUS at 15:00

## 2019-07-25 ASSESSMENT — PULMONARY FUNCTION TESTS
PIF_VALUE: 14
PIF_VALUE: 14
PIF_VALUE: 13
PIF_VALUE: 12

## 2019-07-25 ASSESSMENT — PAIN SCALES - GENERAL
PAINLEVEL_OUTOF10: 0
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
## 2019-07-26 ENCOUNTER — APPOINTMENT (OUTPATIENT)
Dept: GENERAL RADIOLOGY | Age: 44
DRG: 207 | End: 2019-07-26

## 2019-07-26 LAB
ANION GAP SERPL CALCULATED.3IONS-SCNC: 17 MEQ/L (ref 9–15)
BASE EXCESS ARTERIAL: -2 (ref -3–3)
BUN BLDV-MCNC: 7 MG/DL (ref 6–20)
CALCIUM IONIZED: 1.17 MMOL/L (ref 1.12–1.32)
CALCIUM SERPL-MCNC: 7.8 MG/DL (ref 8.5–9.9)
CHLORIDE BLD-SCNC: 106 MEQ/L (ref 95–107)
CO2: 22 MEQ/L (ref 20–31)
CREAT SERPL-MCNC: 0.71 MG/DL (ref 0.7–1.2)
GFR AFRICAN AMERICAN: >60
GFR AFRICAN AMERICAN: >60
GFR NON-AFRICAN AMERICAN: >60
GFR NON-AFRICAN AMERICAN: >60
GLUCOSE BLD-MCNC: 76 MG/DL (ref 60–115)
GLUCOSE BLD-MCNC: 82 MG/DL (ref 70–99)
HCO3 ARTERIAL: 24.6 MMOL/L (ref 21–29)
HCT VFR BLD CALC: 42.2 % (ref 42–52)
HEMOGLOBIN: 14.4 G/DL (ref 14–18)
HEMOGLOBIN: 15 GM/DL (ref 13.5–17.5)
LACTATE: 1.85 MMOL/L (ref 0.4–2)
MCH RBC QN AUTO: 34.7 PG (ref 27–31.3)
MCHC RBC AUTO-ENTMCNC: 34.3 % (ref 33–37)
MCV RBC AUTO: 101.3 FL (ref 80–100)
O2 SAT, ARTERIAL: 98 % (ref 93–100)
PCO2 ARTERIAL: 48 MM HG (ref 35–45)
PDW BLD-RTO: 14.1 % (ref 11.5–14.5)
PERFORMED ON: ABNORMAL
PH ARTERIAL: 7.32 (ref 7.35–7.45)
PLATELET # BLD: 207 K/UL (ref 130–400)
PO2 ARTERIAL: 115 MM HG (ref 75–108)
POC CHLORIDE: 110 MEQ/L (ref 99–110)
POC CREATININE: 0.6 MG/DL (ref 0.9–1.3)
POC FIO2: 35
POC HEMATOCRIT: 44 % (ref 41–53)
POC POTASSIUM: 3.9 MEQ/L (ref 3.5–5.1)
POC SAMPLE TYPE: ABNORMAL
POC SODIUM: 145 MEQ/L (ref 136–145)
POTASSIUM REFLEX MAGNESIUM: 4 MEQ/L (ref 3.4–4.9)
RBC # BLD: 4.16 M/UL (ref 4.7–6.1)
SODIUM BLD-SCNC: 145 MEQ/L (ref 135–144)
TCO2 ARTERIAL: 26 (ref 22–29)
WBC # BLD: 10.3 K/UL (ref 4.8–10.8)

## 2019-07-26 PROCEDURE — 2500000003 HC RX 250 WO HCPCS: Performed by: NURSE PRACTITIONER

## 2019-07-26 PROCEDURE — 99291 CRITICAL CARE FIRST HOUR: CPT | Performed by: INTERNAL MEDICINE

## 2019-07-26 PROCEDURE — 2000000000 HC ICU R&B

## 2019-07-26 PROCEDURE — 2500000003 HC RX 250 WO HCPCS: Performed by: INTERNAL MEDICINE

## 2019-07-26 PROCEDURE — 2580000003 HC RX 258: Performed by: INTERNAL MEDICINE

## 2019-07-26 PROCEDURE — 6370000000 HC RX 637 (ALT 250 FOR IP): Performed by: INTERNAL MEDICINE

## 2019-07-26 PROCEDURE — 82803 BLOOD GASES ANY COMBINATION: CPT

## 2019-07-26 PROCEDURE — 85027 COMPLETE CBC AUTOMATED: CPT

## 2019-07-26 PROCEDURE — 94003 VENT MGMT INPAT SUBQ DAY: CPT

## 2019-07-26 PROCEDURE — 85014 HEMATOCRIT: CPT

## 2019-07-26 PROCEDURE — 2580000003 HC RX 258: Performed by: NURSE PRACTITIONER

## 2019-07-26 PROCEDURE — 82330 ASSAY OF CALCIUM: CPT

## 2019-07-26 PROCEDURE — 80048 BASIC METABOLIC PNL TOTAL CA: CPT

## 2019-07-26 PROCEDURE — 82435 ASSAY OF BLOOD CHLORIDE: CPT

## 2019-07-26 PROCEDURE — 36600 WITHDRAWAL OF ARTERIAL BLOOD: CPT

## 2019-07-26 PROCEDURE — 6360000002 HC RX W HCPCS: Performed by: INTERNAL MEDICINE

## 2019-07-26 PROCEDURE — 36415 COLL VENOUS BLD VENIPUNCTURE: CPT

## 2019-07-26 PROCEDURE — 82565 ASSAY OF CREATININE: CPT

## 2019-07-26 PROCEDURE — 84295 ASSAY OF SERUM SODIUM: CPT

## 2019-07-26 PROCEDURE — 93010 ELECTROCARDIOGRAM REPORT: CPT | Performed by: INTERNAL MEDICINE

## 2019-07-26 PROCEDURE — 71045 X-RAY EXAM CHEST 1 VIEW: CPT

## 2019-07-26 PROCEDURE — 6360000002 HC RX W HCPCS

## 2019-07-26 PROCEDURE — 84132 ASSAY OF SERUM POTASSIUM: CPT

## 2019-07-26 PROCEDURE — 6360000002 HC RX W HCPCS: Performed by: NURSE PRACTITIONER

## 2019-07-26 PROCEDURE — 83605 ASSAY OF LACTIC ACID: CPT

## 2019-07-26 PROCEDURE — 2700000000 HC OXYGEN THERAPY PER DAY

## 2019-07-26 RX ORDER — LORAZEPAM 1 MG/1
1 TABLET ORAL
Status: DISCONTINUED | OUTPATIENT
Start: 2019-07-26 | End: 2019-08-02 | Stop reason: HOSPADM

## 2019-07-26 RX ORDER — SODIUM CHLORIDE 9 MG/ML
INJECTION, SOLUTION INTRAVENOUS CONTINUOUS
Status: DISCONTINUED | OUTPATIENT
Start: 2019-07-26 | End: 2019-07-29

## 2019-07-26 RX ORDER — LORAZEPAM 1 MG/1
4 TABLET ORAL
Status: DISCONTINUED | OUTPATIENT
Start: 2019-07-26 | End: 2019-08-02 | Stop reason: HOSPADM

## 2019-07-26 RX ORDER — LORAZEPAM 1 MG/1
3 TABLET ORAL
Status: DISCONTINUED | OUTPATIENT
Start: 2019-07-26 | End: 2019-08-02 | Stop reason: HOSPADM

## 2019-07-26 RX ORDER — LORAZEPAM 2 MG/ML
3 INJECTION INTRAMUSCULAR
Status: DISCONTINUED | OUTPATIENT
Start: 2019-07-26 | End: 2019-08-02 | Stop reason: HOSPADM

## 2019-07-26 RX ORDER — LORAZEPAM 2 MG/ML
INJECTION INTRAMUSCULAR
Status: COMPLETED
Start: 2019-07-26 | End: 2019-07-26

## 2019-07-26 RX ORDER — SODIUM CHLORIDE 0.9 % (FLUSH) 0.9 %
10 SYRINGE (ML) INJECTION EVERY 12 HOURS SCHEDULED
Status: DISCONTINUED | OUTPATIENT
Start: 2019-07-26 | End: 2019-07-31 | Stop reason: SDUPTHER

## 2019-07-26 RX ORDER — LORAZEPAM 2 MG/ML
4 INJECTION INTRAMUSCULAR
Status: DISCONTINUED | OUTPATIENT
Start: 2019-07-26 | End: 2019-08-02 | Stop reason: HOSPADM

## 2019-07-26 RX ORDER — LORAZEPAM 2 MG/ML
1 INJECTION INTRAMUSCULAR
Status: DISCONTINUED | OUTPATIENT
Start: 2019-07-26 | End: 2019-08-02 | Stop reason: HOSPADM

## 2019-07-26 RX ORDER — SODIUM CHLORIDE 0.9 % (FLUSH) 0.9 %
10 SYRINGE (ML) INJECTION PRN
Status: DISCONTINUED | OUTPATIENT
Start: 2019-07-26 | End: 2019-07-31 | Stop reason: SDUPTHER

## 2019-07-26 RX ORDER — LORAZEPAM 1 MG/1
2 TABLET ORAL
Status: DISCONTINUED | OUTPATIENT
Start: 2019-07-26 | End: 2019-08-02 | Stop reason: HOSPADM

## 2019-07-26 RX ORDER — LORAZEPAM 2 MG/ML
2 INJECTION INTRAMUSCULAR
Status: DISCONTINUED | OUTPATIENT
Start: 2019-07-26 | End: 2019-08-02 | Stop reason: HOSPADM

## 2019-07-26 RX ADMIN — PROPOFOL 30 MCG/KG/MIN: 10 INJECTION, EMULSION INTRAVENOUS at 12:27

## 2019-07-26 RX ADMIN — ENOXAPARIN SODIUM 40 MG: 40 INJECTION SUBCUTANEOUS at 21:05

## 2019-07-26 RX ADMIN — FAMOTIDINE 20 MG: 10 INJECTION, SOLUTION INTRAVENOUS at 21:05

## 2019-07-26 RX ADMIN — CHLORHEXIDINE GLUCONATE 0.12% ORAL RINSE 15 ML: 1.2 LIQUID ORAL at 21:05

## 2019-07-26 RX ADMIN — DEXMEDETOMIDINE 1.2 MCG/KG/HR: 100 INJECTION, SOLUTION, CONCENTRATE INTRAVENOUS at 23:39

## 2019-07-26 RX ADMIN — LORAZEPAM 2 MG: 2 INJECTION INTRAMUSCULAR; INTRAVENOUS at 13:29

## 2019-07-26 RX ADMIN — LABETALOL 20 MG/4 ML (5 MG/ML) INTRAVENOUS SYRINGE 5 MG: at 21:21

## 2019-07-26 RX ADMIN — DEXMEDETOMIDINE 0.9 MCG/KG/HR: 100 INJECTION, SOLUTION, CONCENTRATE INTRAVENOUS at 12:23

## 2019-07-26 RX ADMIN — SODIUM CHLORIDE: 9 INJECTION, SOLUTION INTRAVENOUS at 16:18

## 2019-07-26 RX ADMIN — PROPOFOL 50 MCG/KG/MIN: 10 INJECTION, EMULSION INTRAVENOUS at 23:39

## 2019-07-26 RX ADMIN — LORAZEPAM 2 MG: 2 INJECTION INTRAMUSCULAR; INTRAVENOUS at 02:20

## 2019-07-26 RX ADMIN — LORAZEPAM 2 MG: 2 INJECTION INTRAMUSCULAR; INTRAVENOUS at 22:56

## 2019-07-26 RX ADMIN — CHLORHEXIDINE GLUCONATE 0.12% ORAL RINSE 15 ML: 1.2 LIQUID ORAL at 08:12

## 2019-07-26 RX ADMIN — FAMOTIDINE 20 MG: 10 INJECTION, SOLUTION INTRAVENOUS at 08:12

## 2019-07-26 RX ADMIN — PROPOFOL 40 MCG/KG/MIN: 10 INJECTION, EMULSION INTRAVENOUS at 08:11

## 2019-07-26 RX ADMIN — DEXMEDETOMIDINE 0.8 MCG/KG/HR: 100 INJECTION, SOLUTION, CONCENTRATE INTRAVENOUS at 06:40

## 2019-07-26 RX ADMIN — LORAZEPAM 1 MG: 2 INJECTION INTRAMUSCULAR; INTRAVENOUS at 21:15

## 2019-07-26 RX ADMIN — DEXMEDETOMIDINE 1.1 MCG/KG/HR: 100 INJECTION, SOLUTION, CONCENTRATE INTRAVENOUS at 19:11

## 2019-07-26 RX ADMIN — PROPOFOL 50 MCG/KG/MIN: 10 INJECTION, EMULSION INTRAVENOUS at 20:14

## 2019-07-26 RX ADMIN — LORAZEPAM 2 MG: 2 INJECTION INTRAMUSCULAR; INTRAVENOUS at 09:48

## 2019-07-26 RX ADMIN — Medication 10 ML: at 08:13

## 2019-07-26 RX ADMIN — FOLIC ACID: 5 INJECTION, SOLUTION INTRAMUSCULAR; INTRAVENOUS; SUBCUTANEOUS at 09:29

## 2019-07-26 RX ADMIN — PROPOFOL 1000 MG: 10 INJECTION, EMULSION INTRAVENOUS at 03:17

## 2019-07-26 ASSESSMENT — PULMONARY FUNCTION TESTS
PIF_VALUE: 26
PIF_VALUE: 29
PIF_VALUE: 16
PIF_VALUE: 13
PIF_VALUE: 12
PIF_VALUE: 15
PIF_VALUE: 28
PIF_VALUE: 12
PIF_VALUE: 19
PIF_VALUE: 11
PIF_VALUE: 24
PIF_VALUE: 13
PIF_VALUE: 11
PIF_VALUE: 10
PIF_VALUE: 13
PIF_VALUE: 11
PIF_VALUE: 13
PIF_VALUE: 13
PIF_VALUE: 11
PIF_VALUE: 12
PIF_VALUE: 27
PIF_VALUE: 24
PIF_VALUE: 11
PIF_VALUE: 11
PIF_VALUE: 13
PIF_VALUE: 12
PIF_VALUE: 32
PIF_VALUE: 28
PIF_VALUE: 12
PIF_VALUE: 11
PIF_VALUE: 13

## 2019-07-26 ASSESSMENT — PAIN SCALES - GENERAL
PAINLEVEL_OUTOF10: 0
PAINLEVEL_OUTOF10: 0
PAINLEVEL_OUTOF10: 3
PAINLEVEL_OUTOF10: 0
PAINLEVEL_OUTOF10: 6

## 2019-07-26 NOTE — FLOWSHEET NOTE
0715 report at bedside. Pt repositioned and skin inspected- no breakdown under mepliex. Does not follow commands at this time, will provide sedation vacation- see MAR for weaning. Restraints maintained to prevent line and tube removal. Pink slipped. 0800 assessment complete see flowsheet. On vent, vss. LS c/dim. Oral care and suctioning done, scant secretions noted. Skin p/w/d/i MP SR PPP no edema. abd s/f bs + OG to LIWS. meds per [facility] CD yellow urine. Will monitor    0900 pt mom called, she had the HIPAA code, updated per request.     1350 pt awake after weaning sedation, GRACE and follows commands. Denies pain. Slight tremors noted to hands, CIWA score done. 0935 critical care rounds done and case reviewed with team. Plan to remain on ventilator to manage withdrawal symptoms    0945 pt agitated, shaking head, attempting to reach for ETT. Suctioned large amount of yellow sputum from mouth. Noted moderate tremors to hands and arms, sweating, restless. Propofol increased and medicated with ativan per CIWA score    1000 pt resting comfortably. Repositioned using sling. Will monitor. _Electronically signed by Julian Apodaca RN on 7/26/2019 at 10:39 AM     1200 no change to assessment from previous, continuing to score with CIWA scale as needed. Repositioned, oral care done. 1330 TF started per order. [id number] spoke with pt mom Chirag Hairston, updated per request. POC explained. _Electronically signed by Julian Apodaca RN on 7/26/2019 at 1:53 PM    1400 repositioned, vss. Will monitor    1500 I/o's done, perfect serve sent to Dr. Jono Sanchez re: urine output and possible need for maintenance IVF. 1600 no change to assessment- repositioned and oral care done. 1745 I/o's done. Pt resting comfortably throughout afternoon with occasional bouts of restlessness when sedation was adjusted, seems comfortable now with no signs of withdrawal at this time.  _Electronically signed by Julian Apodaca RN on 7/26/2019 at 5:57 PM

## 2019-07-26 NOTE — CONSULTS
Pt in vent, unable to assess.   Please call me when he is able to talk    Electronically signed by Arun Knowles MD on 7/26/2019 at 4:43 PM
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
## 2019-07-27 ENCOUNTER — APPOINTMENT (OUTPATIENT)
Dept: GENERAL RADIOLOGY | Age: 44
DRG: 207 | End: 2019-07-27

## 2019-07-27 LAB
ANION GAP SERPL CALCULATED.3IONS-SCNC: 12 MEQ/L (ref 9–15)
BACTERIA: NEGATIVE /HPF
BASE EXCESS ARTERIAL: 4 (ref -3–3)
BILIRUBIN URINE: NEGATIVE
BLOOD, URINE: ABNORMAL
BUN BLDV-MCNC: 8 MG/DL (ref 6–20)
CALCIUM IONIZED: 1.16 MMOL/L (ref 1.12–1.32)
CALCIUM SERPL-MCNC: 8.3 MG/DL (ref 8.5–9.9)
CHLORIDE BLD-SCNC: 103 MEQ/L (ref 95–107)
CLARITY: CLEAR
CO2: 26 MEQ/L (ref 20–31)
COLOR: YELLOW
CREAT SERPL-MCNC: 0.83 MG/DL (ref 0.7–1.2)
EPITHELIAL CELLS, UA: ABNORMAL /HPF (ref 0–5)
GFR AFRICAN AMERICAN: >60
GFR AFRICAN AMERICAN: >60
GFR NON-AFRICAN AMERICAN: >60
GFR NON-AFRICAN AMERICAN: >60
GLUCOSE BLD-MCNC: 129 MG/DL (ref 70–99)
GLUCOSE BLD-MCNC: 144 MG/DL (ref 60–115)
GLUCOSE URINE: NEGATIVE MG/DL
HCO3 ARTERIAL: 28 MMOL/L (ref 21–29)
HCT VFR BLD CALC: 42.6 % (ref 42–52)
HEMOGLOBIN: 14.9 G/DL (ref 14–18)
HEMOGLOBIN: 16.1 GM/DL (ref 13.5–17.5)
HYALINE CASTS: ABNORMAL /HPF (ref 0–5)
KETONES, URINE: NEGATIVE MG/DL
LACTATE: 0.68 MMOL/L (ref 0.4–2)
LEUKOCYTE ESTERASE, URINE: ABNORMAL
MAGNESIUM: 1.7 MG/DL (ref 1.7–2.4)
MCH RBC QN AUTO: 35.1 PG (ref 27–31.3)
MCHC RBC AUTO-ENTMCNC: 35 % (ref 33–37)
MCV RBC AUTO: 100.3 FL (ref 80–100)
NITRITE, URINE: NEGATIVE
O2 SAT, ARTERIAL: 98 % (ref 93–100)
PCO2 ARTERIAL: 42 MM HG (ref 35–45)
PDW BLD-RTO: 13.7 % (ref 11.5–14.5)
PERFORMED ON: ABNORMAL
PH ARTERIAL: 7.43 (ref 7.35–7.45)
PH UA: 6 (ref 5–9)
PLATELET # BLD: 174 K/UL (ref 130–400)
PO2 ARTERIAL: 100 MM HG (ref 75–108)
POC CHLORIDE: 104 MEQ/L (ref 99–110)
POC CREATININE: 0.7 MG/DL (ref 0.9–1.3)
POC FIO2: 30
POC HEMATOCRIT: 47 % (ref 41–53)
POC POTASSIUM: 3.9 MEQ/L (ref 3.5–5.1)
POC SAMPLE TYPE: ABNORMAL
POC SODIUM: 141 MEQ/L (ref 136–145)
POTASSIUM REFLEX MAGNESIUM: 4.1 MEQ/L (ref 3.4–4.9)
PROTEIN UA: ABNORMAL MG/DL
RBC # BLD: 4.25 M/UL (ref 4.7–6.1)
RBC UA: ABNORMAL /HPF (ref 0–5)
SODIUM BLD-SCNC: 141 MEQ/L (ref 135–144)
SPECIFIC GRAVITY UA: 1.03 (ref 1–1.03)
TCO2 ARTERIAL: 29 (ref 22–29)
URINE REFLEX TO CULTURE: YES
UROBILINOGEN, URINE: 1 E.U./DL
WBC # BLD: 10.4 K/UL (ref 4.8–10.8)
WBC UA: ABNORMAL /HPF (ref 0–5)

## 2019-07-27 PROCEDURE — 83605 ASSAY OF LACTIC ACID: CPT

## 2019-07-27 PROCEDURE — 2500000003 HC RX 250 WO HCPCS: Performed by: NURSE PRACTITIONER

## 2019-07-27 PROCEDURE — 87040 BLOOD CULTURE FOR BACTERIA: CPT

## 2019-07-27 PROCEDURE — 6370000000 HC RX 637 (ALT 250 FOR IP): Performed by: INTERNAL MEDICINE

## 2019-07-27 PROCEDURE — 2580000003 HC RX 258: Performed by: INTERNAL MEDICINE

## 2019-07-27 PROCEDURE — 82330 ASSAY OF CALCIUM: CPT

## 2019-07-27 PROCEDURE — 6360000002 HC RX W HCPCS: Performed by: NURSE PRACTITIONER

## 2019-07-27 PROCEDURE — 80048 BASIC METABOLIC PNL TOTAL CA: CPT

## 2019-07-27 PROCEDURE — 82803 BLOOD GASES ANY COMBINATION: CPT

## 2019-07-27 PROCEDURE — 2500000003 HC RX 250 WO HCPCS: Performed by: INTERNAL MEDICINE

## 2019-07-27 PROCEDURE — 6360000002 HC RX W HCPCS: Performed by: INTERNAL MEDICINE

## 2019-07-27 PROCEDURE — 2700000000 HC OXYGEN THERAPY PER DAY

## 2019-07-27 PROCEDURE — 99291 CRITICAL CARE FIRST HOUR: CPT | Performed by: INTERNAL MEDICINE

## 2019-07-27 PROCEDURE — 36415 COLL VENOUS BLD VENIPUNCTURE: CPT

## 2019-07-27 PROCEDURE — 84295 ASSAY OF SERUM SODIUM: CPT

## 2019-07-27 PROCEDURE — 82435 ASSAY OF BLOOD CHLORIDE: CPT

## 2019-07-27 PROCEDURE — 87086 URINE CULTURE/COLONY COUNT: CPT

## 2019-07-27 PROCEDURE — 85014 HEMATOCRIT: CPT

## 2019-07-27 PROCEDURE — 82565 ASSAY OF CREATININE: CPT

## 2019-07-27 PROCEDURE — 84132 ASSAY OF SERUM POTASSIUM: CPT

## 2019-07-27 PROCEDURE — 36600 WITHDRAWAL OF ARTERIAL BLOOD: CPT

## 2019-07-27 PROCEDURE — 85027 COMPLETE CBC AUTOMATED: CPT

## 2019-07-27 PROCEDURE — 71045 X-RAY EXAM CHEST 1 VIEW: CPT

## 2019-07-27 PROCEDURE — 81001 URINALYSIS AUTO W/SCOPE: CPT

## 2019-07-27 PROCEDURE — 83735 ASSAY OF MAGNESIUM: CPT

## 2019-07-27 PROCEDURE — 2000000000 HC ICU R&B

## 2019-07-27 PROCEDURE — 94003 VENT MGMT INPAT SUBQ DAY: CPT

## 2019-07-27 RX ORDER — HYDRALAZINE HYDROCHLORIDE 20 MG/ML
20 INJECTION INTRAMUSCULAR; INTRAVENOUS EVERY 4 HOURS PRN
Status: DISCONTINUED | OUTPATIENT
Start: 2019-07-27 | End: 2019-07-27

## 2019-07-27 RX ORDER — CLONIDINE 0.2 MG/24H
1 PATCH, EXTENDED RELEASE TRANSDERMAL
Status: DISCONTINUED | OUTPATIENT
Start: 2019-07-27 | End: 2019-07-27

## 2019-07-27 RX ORDER — ACETAMINOPHEN 325 MG/1
650 TABLET ORAL EVERY 4 HOURS PRN
Status: DISCONTINUED | OUTPATIENT
Start: 2019-07-27 | End: 2019-08-02 | Stop reason: HOSPADM

## 2019-07-27 RX ORDER — HYDRALAZINE HYDROCHLORIDE 20 MG/ML
20 INJECTION INTRAMUSCULAR; INTRAVENOUS EVERY 4 HOURS PRN
Status: DISCONTINUED | OUTPATIENT
Start: 2019-07-27 | End: 2019-08-02 | Stop reason: HOSPADM

## 2019-07-27 RX ORDER — HYDRALAZINE HYDROCHLORIDE 20 MG/ML
10 INJECTION INTRAMUSCULAR; INTRAVENOUS EVERY 6 HOURS PRN
Status: DISCONTINUED | OUTPATIENT
Start: 2019-07-27 | End: 2019-07-27

## 2019-07-27 RX ORDER — CLONIDINE HYDROCHLORIDE 0.2 MG/1
0.2 TABLET ORAL EVERY 8 HOURS
Status: DISCONTINUED | OUTPATIENT
Start: 2019-07-27 | End: 2019-07-30

## 2019-07-27 RX ORDER — LABETALOL 20 MG/4 ML (5 MG/ML) INTRAVENOUS SYRINGE
10 EVERY 4 HOURS PRN
Status: DISCONTINUED | OUTPATIENT
Start: 2019-07-27 | End: 2019-08-02 | Stop reason: HOSPADM

## 2019-07-27 RX ADMIN — LABETALOL 20 MG/4 ML (5 MG/ML) INTRAVENOUS SYRINGE 10 MG: at 05:27

## 2019-07-27 RX ADMIN — PROPOFOL 50 MCG/KG/MIN: 10 INJECTION, EMULSION INTRAVENOUS at 21:32

## 2019-07-27 RX ADMIN — LORAZEPAM 2 MG: 2 INJECTION INTRAMUSCULAR; INTRAVENOUS at 19:32

## 2019-07-27 RX ADMIN — Medication 10 ML: at 11:23

## 2019-07-27 RX ADMIN — CLONIDINE HYDROCHLORIDE 0.2 MG: 0.2 TABLET ORAL at 11:03

## 2019-07-27 RX ADMIN — DEXMEDETOMIDINE 1.4 MCG/KG/HR: 100 INJECTION, SOLUTION, CONCENTRATE INTRAVENOUS at 13:35

## 2019-07-27 RX ADMIN — LABETALOL 20 MG/4 ML (5 MG/ML) INTRAVENOUS SYRINGE 5 MG: at 02:18

## 2019-07-27 RX ADMIN — DEXMEDETOMIDINE 1.2 MCG/KG/HR: 100 INJECTION, SOLUTION, CONCENTRATE INTRAVENOUS at 06:35

## 2019-07-27 RX ADMIN — SODIUM CHLORIDE: 9 INJECTION, SOLUTION INTRAVENOUS at 12:56

## 2019-07-27 RX ADMIN — FAMOTIDINE 20 MG: 10 INJECTION, SOLUTION INTRAVENOUS at 08:10

## 2019-07-27 RX ADMIN — DEXMEDETOMIDINE 1.4 MCG/KG/HR: 100 INJECTION, SOLUTION, CONCENTRATE INTRAVENOUS at 21:22

## 2019-07-27 RX ADMIN — LORAZEPAM 2 MG: 2 INJECTION INTRAMUSCULAR; INTRAVENOUS at 05:55

## 2019-07-27 RX ADMIN — LORAZEPAM 3 MG: 2 INJECTION INTRAMUSCULAR; INTRAVENOUS at 21:58

## 2019-07-27 RX ADMIN — PROPOFOL 50 MCG/KG/MIN: 10 INJECTION, EMULSION INTRAVENOUS at 08:16

## 2019-07-27 RX ADMIN — LORAZEPAM 2 MG: 2 INJECTION INTRAMUSCULAR; INTRAVENOUS at 10:02

## 2019-07-27 RX ADMIN — PROPOFOL 50 MCG/KG/MIN: 10 INJECTION, EMULSION INTRAVENOUS at 12:01

## 2019-07-27 RX ADMIN — DEXMEDETOMIDINE 1.4 MCG/KG/HR: 100 INJECTION, SOLUTION, CONCENTRATE INTRAVENOUS at 17:06

## 2019-07-27 RX ADMIN — Medication 10 ML: at 08:11

## 2019-07-27 RX ADMIN — LORAZEPAM 2 MG: 2 INJECTION INTRAMUSCULAR; INTRAVENOUS at 08:28

## 2019-07-27 RX ADMIN — HYDRALAZINE HYDROCHLORIDE 20 MG: 20 INJECTION INTRAMUSCULAR; INTRAVENOUS at 11:22

## 2019-07-27 RX ADMIN — ENOXAPARIN SODIUM 40 MG: 40 INJECTION SUBCUTANEOUS at 21:09

## 2019-07-27 RX ADMIN — FAMOTIDINE 20 MG: 10 INJECTION, SOLUTION INTRAVENOUS at 21:10

## 2019-07-27 RX ADMIN — LORAZEPAM 2 MG: 2 INJECTION INTRAMUSCULAR; INTRAVENOUS at 17:15

## 2019-07-27 RX ADMIN — FENTANYL CITRATE 50 MCG/HR: 50 INJECTION INTRAVENOUS at 21:51

## 2019-07-27 RX ADMIN — ENOXAPARIN SODIUM 40 MG: 40 INJECTION SUBCUTANEOUS at 08:10

## 2019-07-27 RX ADMIN — LABETALOL 20 MG/4 ML (5 MG/ML) INTRAVENOUS SYRINGE 10 MG: at 09:25

## 2019-07-27 RX ADMIN — LORAZEPAM 2 MG: 2 INJECTION INTRAMUSCULAR; INTRAVENOUS at 21:10

## 2019-07-27 RX ADMIN — PROPOFOL 50 MCG/KG/MIN: 10 INJECTION, EMULSION INTRAVENOUS at 04:32

## 2019-07-27 RX ADMIN — CHLORHEXIDINE GLUCONATE 0.12% ORAL RINSE 15 ML: 1.2 LIQUID ORAL at 08:10

## 2019-07-27 RX ADMIN — HYDRALAZINE HYDROCHLORIDE 10 MG: 20 INJECTION INTRAMUSCULAR; INTRAVENOUS at 07:18

## 2019-07-27 RX ADMIN — LORAZEPAM 2 MG: 2 INJECTION INTRAMUSCULAR; INTRAVENOUS at 11:04

## 2019-07-27 RX ADMIN — HYDRALAZINE HYDROCHLORIDE 20 MG: 20 INJECTION INTRAMUSCULAR; INTRAVENOUS at 13:17

## 2019-07-27 RX ADMIN — LORAZEPAM 2 MG: 2 INJECTION INTRAMUSCULAR; INTRAVENOUS at 00:23

## 2019-07-27 RX ADMIN — SODIUM CHLORIDE: 9 INJECTION, SOLUTION INTRAVENOUS at 02:26

## 2019-07-27 RX ADMIN — ACETAMINOPHEN 650 MG: 325 TABLET ORAL at 21:43

## 2019-07-27 RX ADMIN — CHLORHEXIDINE GLUCONATE 0.12% ORAL RINSE 15 ML: 1.2 LIQUID ORAL at 21:10

## 2019-07-27 RX ADMIN — CLONIDINE HYDROCHLORIDE 0.2 MG: 0.2 TABLET ORAL at 21:10

## 2019-07-27 ASSESSMENT — PULMONARY FUNCTION TESTS
PIF_VALUE: 13
PIF_VALUE: 12
PIF_VALUE: 14
PIF_VALUE: 15
PIF_VALUE: 12
PIF_VALUE: 15
PIF_VALUE: 12
PIF_VALUE: 13
PIF_VALUE: 12
PIF_VALUE: 12
PIF_VALUE: 13
PIF_VALUE: 13
PIF_VALUE: 14
PIF_VALUE: 12
PIF_VALUE: 10
PIF_VALUE: 13
PIF_VALUE: 12
PIF_VALUE: 12
PIF_VALUE: 14
PIF_VALUE: 20
PIF_VALUE: 12
PIF_VALUE: 13
PIF_VALUE: 12
PIF_VALUE: 13
PIF_VALUE: 12
PIF_VALUE: 12
PIF_VALUE: 15
PIF_VALUE: 21

## 2019-07-27 ASSESSMENT — PAIN SCALES - GENERAL
PAINLEVEL_OUTOF10: 0
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
## 2019-07-27 NOTE — CARE COORDINATION
Patient remains on vent. Multiple meds to control withdrawal symptoms and PRN hydralazine and labetalol to control bp's. C3 to follow for need pending extubation. Patient will need Psych eval to determine need for inpatient unit at discharge.

## 2019-07-27 NOTE — FLOWSHEET NOTE
0400: Message sent to Dr. Bonnie Quezada regarding patient's persistent hypertension - awaiting response. Electronically signed by Cynthia Hubbard RN on 7/27/2019 at 4:02 AM  0420: Second message sent to Dr. Bonnie Quezada regarding HTN. Awaiting response. Electronically signed by Cynthia Hubbard RN on 7/27/2019 at 4:20 AM  0600: Discussed continued HTN with Dr. Bonnie Quezada face to face - new orders received. 0700: Handoff given to Yara Cat RN at bedside.  Electronically signed by Cynthia Hubbard RN on 7/27/2019 at 7:34 AM

## 2019-07-28 ENCOUNTER — APPOINTMENT (OUTPATIENT)
Dept: GENERAL RADIOLOGY | Age: 44
DRG: 207 | End: 2019-07-28

## 2019-07-28 LAB
ANION GAP SERPL CALCULATED.3IONS-SCNC: 10 MEQ/L (ref 9–15)
BUN BLDV-MCNC: 7 MG/DL (ref 6–20)
CALCIUM SERPL-MCNC: 8.2 MG/DL (ref 8.5–9.9)
CHLORIDE BLD-SCNC: 103 MEQ/L (ref 95–107)
CO2: 27 MEQ/L (ref 20–31)
CREAT SERPL-MCNC: 0.7 MG/DL (ref 0.7–1.2)
GFR AFRICAN AMERICAN: >60
GFR NON-AFRICAN AMERICAN: >60
GLUCOSE BLD-MCNC: 116 MG/DL (ref 70–99)
HCT VFR BLD CALC: 40.6 % (ref 42–52)
HEMOGLOBIN: 13.7 G/DL (ref 14–18)
MCH RBC QN AUTO: 34.3 PG (ref 27–31.3)
MCHC RBC AUTO-ENTMCNC: 33.8 % (ref 33–37)
MCV RBC AUTO: 101.4 FL (ref 80–100)
PDW BLD-RTO: 13.8 % (ref 11.5–14.5)
PLATELET # BLD: 159 K/UL (ref 130–400)
POTASSIUM REFLEX MAGNESIUM: 4.1 MEQ/L (ref 3.4–4.9)
RBC # BLD: 4 M/UL (ref 4.7–6.1)
SODIUM BLD-SCNC: 140 MEQ/L (ref 135–144)
WBC # BLD: 13.5 K/UL (ref 4.8–10.8)

## 2019-07-28 PROCEDURE — 2580000003 HC RX 258: Performed by: INTERNAL MEDICINE

## 2019-07-28 PROCEDURE — 6360000002 HC RX W HCPCS: Performed by: INTERNAL MEDICINE

## 2019-07-28 PROCEDURE — 94003 VENT MGMT INPAT SUBQ DAY: CPT

## 2019-07-28 PROCEDURE — 71045 X-RAY EXAM CHEST 1 VIEW: CPT

## 2019-07-28 PROCEDURE — 89220 SPUTUM SPECIMEN COLLECTION: CPT

## 2019-07-28 PROCEDURE — 2700000000 HC OXYGEN THERAPY PER DAY

## 2019-07-28 PROCEDURE — 87077 CULTURE AEROBIC IDENTIFY: CPT

## 2019-07-28 PROCEDURE — 87205 SMEAR GRAM STAIN: CPT

## 2019-07-28 PROCEDURE — 6360000002 HC RX W HCPCS: Performed by: NURSE PRACTITIONER

## 2019-07-28 PROCEDURE — 80048 BASIC METABOLIC PNL TOTAL CA: CPT

## 2019-07-28 PROCEDURE — 6370000000 HC RX 637 (ALT 250 FOR IP): Performed by: INTERNAL MEDICINE

## 2019-07-28 PROCEDURE — 2000000000 HC ICU R&B

## 2019-07-28 PROCEDURE — 87070 CULTURE OTHR SPECIMN AEROBIC: CPT

## 2019-07-28 PROCEDURE — 2500000003 HC RX 250 WO HCPCS: Performed by: INTERNAL MEDICINE

## 2019-07-28 PROCEDURE — 87186 SC STD MICRODIL/AGAR DIL: CPT

## 2019-07-28 PROCEDURE — 36415 COLL VENOUS BLD VENIPUNCTURE: CPT

## 2019-07-28 PROCEDURE — 85027 COMPLETE CBC AUTOMATED: CPT

## 2019-07-28 PROCEDURE — 2500000003 HC RX 250 WO HCPCS: Performed by: NURSE PRACTITIONER

## 2019-07-28 PROCEDURE — 2580000003 HC RX 258: Performed by: NURSE PRACTITIONER

## 2019-07-28 PROCEDURE — 99291 CRITICAL CARE FIRST HOUR: CPT | Performed by: INTERNAL MEDICINE

## 2019-07-28 RX ADMIN — Medication 10 ML: at 08:10

## 2019-07-28 RX ADMIN — FENTANYL CITRATE 100 MCG/HR: 50 INJECTION INTRAVENOUS at 06:36

## 2019-07-28 RX ADMIN — VANCOMYCIN HYDROCHLORIDE 1250 MG: 5 INJECTION, POWDER, LYOPHILIZED, FOR SOLUTION INTRAVENOUS at 21:59

## 2019-07-28 RX ADMIN — PROPOFOL 50 MCG/KG/MIN: 10 INJECTION, EMULSION INTRAVENOUS at 09:26

## 2019-07-28 RX ADMIN — SODIUM CHLORIDE: 9 INJECTION, SOLUTION INTRAVENOUS at 00:43

## 2019-07-28 RX ADMIN — FAMOTIDINE 20 MG: 10 INJECTION, SOLUTION INTRAVENOUS at 21:59

## 2019-07-28 RX ADMIN — CHLORHEXIDINE GLUCONATE 0.12% ORAL RINSE 15 ML: 1.2 LIQUID ORAL at 08:09

## 2019-07-28 RX ADMIN — LORAZEPAM 2 MG: 2 INJECTION INTRAMUSCULAR; INTRAVENOUS at 06:56

## 2019-07-28 RX ADMIN — CHLORHEXIDINE GLUCONATE 0.12% ORAL RINSE 15 ML: 1.2 LIQUID ORAL at 21:59

## 2019-07-28 RX ADMIN — SODIUM CHLORIDE: 9 INJECTION, SOLUTION INTRAVENOUS at 08:10

## 2019-07-28 RX ADMIN — VANCOMYCIN HYDROCHLORIDE 1500 MG: 5 INJECTION, POWDER, LYOPHILIZED, FOR SOLUTION INTRAVENOUS at 13:09

## 2019-07-28 RX ADMIN — PROPOFOL 50 MCG/KG/MIN: 10 INJECTION, EMULSION INTRAVENOUS at 06:37

## 2019-07-28 RX ADMIN — PIPERACILLIN SODIUM,TAZOBACTAM SODIUM 3.38 G: 3; .375 INJECTION, POWDER, FOR SOLUTION INTRAVENOUS at 11:00

## 2019-07-28 RX ADMIN — DEXMEDETOMIDINE 1.4 MCG/KG/HR: 100 INJECTION, SOLUTION, CONCENTRATE INTRAVENOUS at 09:41

## 2019-07-28 RX ADMIN — DEXMEDETOMIDINE 1.4 MCG/KG/HR: 100 INJECTION, SOLUTION, CONCENTRATE INTRAVENOUS at 02:00

## 2019-07-28 RX ADMIN — DEXMEDETOMIDINE 1.4 MCG/KG/HR: 100 INJECTION, SOLUTION, CONCENTRATE INTRAVENOUS at 06:36

## 2019-07-28 RX ADMIN — PROPOFOL 50 MCG/KG/MIN: 10 INJECTION, EMULSION INTRAVENOUS at 23:30

## 2019-07-28 RX ADMIN — PROPOFOL 40 MCG/KG/MIN: 10 INJECTION, EMULSION INTRAVENOUS at 18:25

## 2019-07-28 RX ADMIN — FOLIC ACID: 5 INJECTION, SOLUTION INTRAMUSCULAR; INTRAVENOUS; SUBCUTANEOUS at 09:36

## 2019-07-28 RX ADMIN — LORAZEPAM 2 MG/HR: 2 INJECTION INTRAMUSCULAR; INTRAVENOUS at 13:36

## 2019-07-28 RX ADMIN — LORAZEPAM 1 MG: 2 INJECTION INTRAMUSCULAR; INTRAVENOUS at 00:43

## 2019-07-28 RX ADMIN — PIPERACILLIN SODIUM,TAZOBACTAM SODIUM 3.38 G: 3; .375 INJECTION, POWDER, FOR SOLUTION INTRAVENOUS at 18:25

## 2019-07-28 RX ADMIN — ENOXAPARIN SODIUM 40 MG: 40 INJECTION SUBCUTANEOUS at 21:59

## 2019-07-28 RX ADMIN — CLONIDINE HYDROCHLORIDE 0.2 MG: 0.2 TABLET ORAL at 11:15

## 2019-07-28 RX ADMIN — PROPOFOL 50 MCG/KG/MIN: 10 INJECTION, EMULSION INTRAVENOUS at 02:00

## 2019-07-28 RX ADMIN — FAMOTIDINE 20 MG: 10 INJECTION, SOLUTION INTRAVENOUS at 08:09

## 2019-07-28 ASSESSMENT — PULMONARY FUNCTION TESTS
PIF_VALUE: 16
PIF_VALUE: 34
PIF_VALUE: 23
PIF_VALUE: 15
PIF_VALUE: 13
PIF_VALUE: 16
PIF_VALUE: 15
PIF_VALUE: 17
PIF_VALUE: 23
PIF_VALUE: 16
PIF_VALUE: 12
PIF_VALUE: 15
PIF_VALUE: 13
PIF_VALUE: 11
PIF_VALUE: 17
PIF_VALUE: 11
PIF_VALUE: 16
PIF_VALUE: 12
PIF_VALUE: 15
PIF_VALUE: 26
PIF_VALUE: 18
PIF_VALUE: 11
PIF_VALUE: 15
PIF_VALUE: 15
PIF_VALUE: 17
PIF_VALUE: 17
PIF_VALUE: 16
PIF_VALUE: 14
PIF_VALUE: 17
PIF_VALUE: 17
PIF_VALUE: 14
PIF_VALUE: 14

## 2019-07-28 ASSESSMENT — PAIN SCALES - GENERAL: PAINLEVEL_OUTOF10: 0

## 2019-07-28 NOTE — PROGRESS NOTES
Pharmacy Note  Vancomycin Consult    Lucho Goncalves is a 41 y.o. male started on Vancomycin for pneumonia/sepsis; consult received from Dr. Edvin Wolfe to manage therapy. Also receiving the following antibiotics: Zosyn. Patient Active Problem List   Diagnosis    Psychosis ([facility] [address]    Alcohol intoxication in alcoholism with blood level over 0.3 with complication ([facility] [address]       Allergies:  Patient has no known allergies. Temp max: 39.3    Recent Labs     07/27/19  0448 07/28/19  0447   BUN 8 7       Recent Labs     07/27/19  0538 07/28/19  0447   CREATININE 0.7* 0.70       Recent Labs     07/27/19  0448 07/28/19  0447   WBC 10.4 13.5*         Intake/Output Summary (Last 24 hours) at 7/28/2019 1108  Last data filed at 7/28/2019 0800  Gross per 24 hour   Intake 7912.1 ml   Output 1600 ml   Net 6312.1 ml       Culture Date      Source                       Results  7/27/19                blood & urine              pending    Ht Readings from Last 1 Encounters:   07/26/19 5' 9\" (1.753 m)        Wt Readings from Last 1 Encounters:   07/27/19 164 lb 3.9 oz (74.5 kg)         Body mass index is 24.25 kg/m². Estimated Creatinine Clearance: 139 mL/min (based on SCr of 0.7 mg/dL). Goal Trough Level: 15-20 mcg/mL    Assessment/Plan:  Patient get vancomycin 1500mg loading dose followed by vancomycin 1250 mg IV every 12 hours. Will order a vancomycin trough before 4th dose (7/29/19 @ 5874)  Timing of trough level will be determined based on culture results, renal function, and clinical response. Thank you for the consult. Will continue to follow.

## 2019-07-28 NOTE — PROGRESS NOTES
°C), temperature source Oral, resp. rate 22, height 5' 9\" (1.753 m), weight 164 lb 3.9 oz (74.5 kg), SpO2 98 %. Subjective:  Symptoms:  (Patient still on vent/sedation. He had fever of 102.8 last night). Diet:  NPO. Activity level: Impaired due to weakness. Objective:  General Appearance:  Ill-appearing. Vital signs: (most recent): Blood pressure (!) 94/46, pulse 86, temperature 99 °F (37.2 °C), temperature source Axillary, resp. rate 18, height 5' 9\" (1.753 m), weight 164 lb 3.9 oz (74.5 kg), SpO2 97 %. Vital signs are normal.    Output: Producing urine. Lungs:  Normal effort and tachypnea. Breath sounds clear to auscultation. No stridor. There are rales. No wheezes. Heart: Tachycardia. S1 normal and S2 normal.  No murmur. Abdomen: Abdomen is soft. Hypoactive bowel sounds. There is no guarding. There is no mass. Pulses: Distal pulses are intact. Pupils:  Pupils are equal, round, and reactive to light. Skin:  Warm and dry. EXAMINATION: XR CHEST PORTABLE       CLINICAL HISTORY: Alcohol intoxication. Elevated white blood count.       COMPARISONS: 7/27/2019       FINDINGS: Right basilar groundglass infiltrate has become apparent since prior examination of 7/27/2019. Cardiac size is borderline. Pulmonary vascularity is normal. There is no pneumothorax. No pleural effusions are evident.       Endotracheal tube tip is 4.5 cm above the palma. NG tube is in the stomach.           Impression   RIGHT LOWER LOBE INFILTRATE, CONSISTENT WITH PNEUMONIA.           Assessment & Plan     1. Fever/probable Asp PNA/Sepsis/Acute resp failure on vent management. Starting vanco/zosyn iv  Blood/endotracheal secretions culture results pending.      2. probable alcohol withdrawal   Continue precedex/clonidine/lorazepam      3. htn  Continue clonidine/hydralazine/labetalol    Micki Suh MD  7/28/2019

## 2019-07-28 NOTE — PROGRESS NOTES
Patient currently on AC/ 20/450/30/+5. Patient remains intubated 8.0 marked @ 25 @ teeth. Breath sounds are equal diminished bilat. ET tube secure, lips and tongue intact. OG @ 60cm. Patient tolerating tube feed @ 50ml/hr Vital HP. Patient is tolerating tube feeds with no residuals. OG secured at this time. Dr. Alisa Ambrose added CXR today. CXR revealed RLL pnuemonia. Patient currently on anitbiotics which which started today. Sputum culture sent as ordered. Weaned patient off precedex and began Ativan. 1430 Patient has decrease in BP 78/40. Decreased propofol 30mcg/kg/hr, decreased ativan to 05.mg/hr from 2mg/hr. Patient BP did not improve. Shut ativan off 1440 decreased fentanyl to 25 mcg/kg/hr. BP improved after changes made. Patient currently at propofol 40 mcg/kg/min and fentanyl @ 25mcg/kg/ hr. BP improved 118/75. Neptali Cooper, patient's mother as well as brother Agustin Alford have been updated.  Electronically signed by Sara Robertson RN on 7/28/2019 at 4:13 PM
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
## 2019-07-29 ENCOUNTER — APPOINTMENT (OUTPATIENT)
Dept: INTERVENTIONAL RADIOLOGY/VASCULAR | Age: 44
DRG: 207 | End: 2019-07-29

## 2019-07-29 LAB
ANION GAP SERPL CALCULATED.3IONS-SCNC: 10 MEQ/L (ref 9–15)
BUN BLDV-MCNC: 9 MG/DL (ref 6–20)
CALCIUM SERPL-MCNC: 8 MG/DL (ref 8.5–9.9)
CHLORIDE BLD-SCNC: 105 MEQ/L (ref 95–107)
CO2: 25 MEQ/L (ref 20–31)
CREAT SERPL-MCNC: 0.47 MG/DL (ref 0.7–1.2)
GFR AFRICAN AMERICAN: >60
GFR NON-AFRICAN AMERICAN: >60
GLUCOSE BLD-MCNC: 99 MG/DL (ref 70–99)
HCT VFR BLD CALC: 36.7 % (ref 42–52)
HEMOGLOBIN: 12.4 G/DL (ref 14–18)
MCH RBC QN AUTO: 34.2 PG (ref 27–31.3)
MCHC RBC AUTO-ENTMCNC: 33.8 % (ref 33–37)
MCV RBC AUTO: 101.2 FL (ref 80–100)
PDW BLD-RTO: 13.6 % (ref 11.5–14.5)
PLATELET # BLD: 154 K/UL (ref 130–400)
POTASSIUM REFLEX MAGNESIUM: 4 MEQ/L (ref 3.4–4.9)
PROCALCITONIN: 0.11 NG/ML (ref 0–0.15)
RBC # BLD: 3.63 M/UL (ref 4.7–6.1)
SODIUM BLD-SCNC: 140 MEQ/L (ref 135–144)
URINE CULTURE, ROUTINE: NORMAL
VANCOMYCIN TROUGH: 6.8 UG/ML (ref 10–20)
WBC # BLD: 12.4 K/UL (ref 4.8–10.8)

## 2019-07-29 PROCEDURE — 80202 ASSAY OF VANCOMYCIN: CPT

## 2019-07-29 PROCEDURE — 6360000002 HC RX W HCPCS: Performed by: INTERNAL MEDICINE

## 2019-07-29 PROCEDURE — 36573 INSJ PICC RS&I 5 YR+: CPT | Performed by: RADIOLOGY

## 2019-07-29 PROCEDURE — 36415 COLL VENOUS BLD VENIPUNCTURE: CPT

## 2019-07-29 PROCEDURE — 85027 COMPLETE CBC AUTOMATED: CPT

## 2019-07-29 PROCEDURE — 99291 CRITICAL CARE FIRST HOUR: CPT | Performed by: INTERNAL MEDICINE

## 2019-07-29 PROCEDURE — 94003 VENT MGMT INPAT SUBQ DAY: CPT

## 2019-07-29 PROCEDURE — 2700000000 HC OXYGEN THERAPY PER DAY

## 2019-07-29 PROCEDURE — 2709999900 IR PICC WO SQ PORT/PUMP > 5 YEARS

## 2019-07-29 PROCEDURE — 6370000000 HC RX 637 (ALT 250 FOR IP): Performed by: INTERNAL MEDICINE

## 2019-07-29 PROCEDURE — 6360000002 HC RX W HCPCS: Performed by: NURSE PRACTITIONER

## 2019-07-29 PROCEDURE — 84145 PROCALCITONIN (PCT): CPT

## 2019-07-29 PROCEDURE — 80048 BASIC METABOLIC PNL TOTAL CA: CPT

## 2019-07-29 PROCEDURE — 2000000000 HC ICU R&B

## 2019-07-29 PROCEDURE — 2580000003 HC RX 258: Performed by: INTERNAL MEDICINE

## 2019-07-29 PROCEDURE — 2500000003 HC RX 250 WO HCPCS: Performed by: INTERNAL MEDICINE

## 2019-07-29 PROCEDURE — 02HV33Z INSERTION OF INFUSION DEVICE INTO SUPERIOR VENA CAVA, PERCUTANEOUS APPROACH: ICD-10-PCS | Performed by: INTERNAL MEDICINE

## 2019-07-29 RX ORDER — SODIUM CHLORIDE 9 MG/ML
250 INJECTION, SOLUTION INTRAVENOUS ONCE
Status: COMPLETED | OUTPATIENT
Start: 2019-07-29 | End: 2019-07-29

## 2019-07-29 RX ORDER — FUROSEMIDE 10 MG/ML
40 INJECTION INTRAMUSCULAR; INTRAVENOUS ONCE
Status: COMPLETED | OUTPATIENT
Start: 2019-07-29 | End: 2019-07-29

## 2019-07-29 RX ORDER — SODIUM CHLORIDE 0.9 % (FLUSH) 0.9 %
10 SYRINGE (ML) INJECTION PRN
Status: DISCONTINUED | OUTPATIENT
Start: 2019-07-29 | End: 2019-08-02 | Stop reason: HOSPADM

## 2019-07-29 RX ORDER — SODIUM CHLORIDE 0.9 % (FLUSH) 0.9 %
10 SYRINGE (ML) INJECTION EVERY 12 HOURS SCHEDULED
Status: DISCONTINUED | OUTPATIENT
Start: 2019-07-29 | End: 2019-08-02 | Stop reason: HOSPADM

## 2019-07-29 RX ORDER — LIDOCAINE HYDROCHLORIDE 20 MG/ML
5 INJECTION, SOLUTION INFILTRATION; PERINEURAL ONCE
Status: COMPLETED | OUTPATIENT
Start: 2019-07-29 | End: 2019-07-29

## 2019-07-29 RX ADMIN — PROPOFOL 45.48 MCG/KG/MIN: 10 INJECTION, EMULSION INTRAVENOUS at 16:59

## 2019-07-29 RX ADMIN — CHLORHEXIDINE GLUCONATE 0.12% ORAL RINSE 15 ML: 1.2 LIQUID ORAL at 20:52

## 2019-07-29 RX ADMIN — CLONIDINE HYDROCHLORIDE 0.2 MG: 0.2 TABLET ORAL at 19:30

## 2019-07-29 RX ADMIN — CHLORDIAZEPOXIDE HYDROCHLORIDE 30 MG: 5 CAPSULE ORAL at 10:31

## 2019-07-29 RX ADMIN — PIPERACILLIN SODIUM,TAZOBACTAM SODIUM 3.38 G: 3; .375 INJECTION, POWDER, FOR SOLUTION INTRAVENOUS at 06:57

## 2019-07-29 RX ADMIN — FUROSEMIDE 40 MG: 10 INJECTION, SOLUTION INTRAMUSCULAR; INTRAVENOUS at 10:31

## 2019-07-29 RX ADMIN — PIPERACILLIN SODIUM,TAZOBACTAM SODIUM 3.38 G: 3; .375 INJECTION, POWDER, FOR SOLUTION INTRAVENOUS at 23:14

## 2019-07-29 RX ADMIN — CHLORDIAZEPOXIDE HYDROCHLORIDE 30 MG: 5 CAPSULE ORAL at 14:08

## 2019-07-29 RX ADMIN — VANCOMYCIN HYDROCHLORIDE 1250 MG: 5 INJECTION, POWDER, LYOPHILIZED, FOR SOLUTION INTRAVENOUS at 22:10

## 2019-07-29 RX ADMIN — FAMOTIDINE 20 MG: 10 INJECTION, SOLUTION INTRAVENOUS at 09:30

## 2019-07-29 RX ADMIN — Medication 10 ML: at 20:54

## 2019-07-29 RX ADMIN — Medication 10 ML: at 20:53

## 2019-07-29 RX ADMIN — CHLORDIAZEPOXIDE HYDROCHLORIDE 30 MG: 5 CAPSULE ORAL at 20:53

## 2019-07-29 RX ADMIN — FENTANYL CITRATE 200 MCG/HR: 50 INJECTION INTRAVENOUS at 06:48

## 2019-07-29 RX ADMIN — PIPERACILLIN SODIUM,TAZOBACTAM SODIUM 3.38 G: 3; .375 INJECTION, POWDER, FOR SOLUTION INTRAVENOUS at 15:25

## 2019-07-29 RX ADMIN — CHLORHEXIDINE GLUCONATE 0.12% ORAL RINSE 15 ML: 1.2 LIQUID ORAL at 09:30

## 2019-07-29 RX ADMIN — MIDAZOLAM 1 MG/HR: 5 INJECTION INTRAMUSCULAR; INTRAVENOUS at 10:31

## 2019-07-29 RX ADMIN — FENTANYL CITRATE 200 MCG/HR: 50 INJECTION INTRAVENOUS at 13:06

## 2019-07-29 RX ADMIN — LIDOCAINE HYDROCHLORIDE 5 ML: 20 INJECTION, SOLUTION INFILTRATION; PERINEURAL at 12:27

## 2019-07-29 RX ADMIN — FAMOTIDINE 20 MG: 10 INJECTION, SOLUTION INTRAVENOUS at 20:52

## 2019-07-29 RX ADMIN — SODIUM CHLORIDE: 9 INJECTION, SOLUTION INTRAVENOUS at 01:50

## 2019-07-29 RX ADMIN — PROPOFOL 50 MCG/KG/MIN: 10 INJECTION, EMULSION INTRAVENOUS at 03:00

## 2019-07-29 RX ADMIN — FENTANYL CITRATE 200 MCG/HR: 50 INJECTION INTRAVENOUS at 01:50

## 2019-07-29 RX ADMIN — PROPOFOL 50.02 MCG/KG/MIN: 10 INJECTION, EMULSION INTRAVENOUS at 13:05

## 2019-07-29 RX ADMIN — VANCOMYCIN HYDROCHLORIDE 1250 MG: 5 INJECTION, POWDER, LYOPHILIZED, FOR SOLUTION INTRAVENOUS at 10:30

## 2019-07-29 RX ADMIN — PROPOFOL 45 MCG/KG/MIN: 10 INJECTION, EMULSION INTRAVENOUS at 21:42

## 2019-07-29 RX ADMIN — FENTANYL CITRATE 100 MCG/HR: 50 INJECTION INTRAVENOUS at 20:02

## 2019-07-29 RX ADMIN — PROPOFOL 50 MCG/KG/MIN: 10 INJECTION, EMULSION INTRAVENOUS at 07:31

## 2019-07-29 RX ADMIN — ENOXAPARIN SODIUM 40 MG: 40 INJECTION SUBCUTANEOUS at 20:53

## 2019-07-29 RX ADMIN — SODIUM CHLORIDE 250 ML: 9 INJECTION, SOLUTION INTRAVENOUS at 12:27

## 2019-07-29 ASSESSMENT — PAIN SCALES - GENERAL
PAINLEVEL_OUTOF10: 0

## 2019-07-29 ASSESSMENT — PULMONARY FUNCTION TESTS
PIF_VALUE: 16
PIF_VALUE: 17
PIF_VALUE: 17
PIF_VALUE: 15
PIF_VALUE: 16
PIF_VALUE: 15
PIF_VALUE: 17
PIF_VALUE: 16
PIF_VALUE: 18
PIF_VALUE: 17
PIF_VALUE: 16
PIF_VALUE: 17
PIF_VALUE: 16
PIF_VALUE: 15
PIF_VALUE: 19
PIF_VALUE: 15
PIF_VALUE: 17
PIF_VALUE: 15
PIF_VALUE: 16
PIF_VALUE: 16
PIF_VALUE: 18
PIF_VALUE: 17
PIF_VALUE: 19
PIF_VALUE: 15
PIF_VALUE: 19
PIF_VALUE: 16
PIF_VALUE: 14
PIF_VALUE: 17

## 2019-07-29 NOTE — PROGRESS NOTES
AM assessment completed. Pt. RASS score is -1 to -2. ETT placement and OG placement verified. Lungs with equal breath sounds. Pt suctioned for moderate amount of thick, creamy yellow secretions. Oral care completed. Restraints released and ROM performed. Resecured for safety. Pt. Call light in reach.    Electronically signed by Hernán Jovel RN on 7/29/2019 at 0900 AM.

## 2019-07-29 NOTE — PROGRESS NOTES
Pt returned from, specials. Successful PICC. Pt tolerated this well.  Electronically signed by Abrahan Villalba RN on 7/29/2019 at 1:04 PM

## 2019-07-30 PROBLEM — F10.929 ACUTE ALCOHOLIC INTOXICATION WITH COMPLICATION (HCC): Status: ACTIVE | Noted: 2019-07-25

## 2019-07-30 LAB
ANION GAP SERPL CALCULATED.3IONS-SCNC: 10 MEQ/L (ref 9–15)
BASE EXCESS ARTERIAL: 5 (ref -3–3)
BUN BLDV-MCNC: 9 MG/DL (ref 6–20)
CALCIUM IONIZED: 1.19 MMOL/L (ref 1.12–1.32)
CALCIUM SERPL-MCNC: 8.4 MG/DL (ref 8.5–9.9)
CHLORIDE BLD-SCNC: 100 MEQ/L (ref 95–107)
CO2: 29 MEQ/L (ref 20–31)
CREAT SERPL-MCNC: 0.49 MG/DL (ref 0.7–1.2)
GFR AFRICAN AMERICAN: >60
GFR AFRICAN AMERICAN: >60
GFR NON-AFRICAN AMERICAN: >60
GFR NON-AFRICAN AMERICAN: >60
GLUCOSE BLD-MCNC: 115 MG/DL (ref 60–115)
GLUCOSE BLD-MCNC: 85 MG/DL (ref 70–99)
HCO3 ARTERIAL: 29.7 MMOL/L (ref 21–29)
HCT VFR BLD CALC: 35.6 % (ref 42–52)
HEMOGLOBIN: 12.4 G/DL (ref 14–18)
HEMOGLOBIN: 14.1 GM/DL (ref 13.5–17.5)
LACTATE: 0.47 MMOL/L (ref 0.4–2)
MCH RBC QN AUTO: 34.6 PG (ref 27–31.3)
MCHC RBC AUTO-ENTMCNC: 34.9 % (ref 33–37)
MCV RBC AUTO: 99.2 FL (ref 80–100)
O2 SAT, ARTERIAL: 95 % (ref 93–100)
PCO2 ARTERIAL: 46 MM HG (ref 35–45)
PDW BLD-RTO: 13.4 % (ref 11.5–14.5)
PERFORMED ON: ABNORMAL
PH ARTERIAL: 7.42 (ref 7.35–7.45)
PLATELET # BLD: 166 K/UL (ref 130–400)
PO2 ARTERIAL: 74 MM HG (ref 75–108)
POC CHLORIDE: 100 MEQ/L (ref 99–110)
POC CREATININE: 0.7 MG/DL (ref 0.9–1.3)
POC FIO2: 30
POC HEMATOCRIT: 41 % (ref 41–53)
POC POTASSIUM: 3.5 MEQ/L (ref 3.5–5.1)
POC SAMPLE TYPE: ABNORMAL
POC SODIUM: 136 MEQ/L (ref 136–145)
POTASSIUM REFLEX MAGNESIUM: 4.1 MEQ/L (ref 3.4–4.9)
RBC # BLD: 3.59 M/UL (ref 4.7–6.1)
SODIUM BLD-SCNC: 139 MEQ/L (ref 135–144)
TCO2 ARTERIAL: 31 (ref 22–29)
WBC # BLD: 6.8 K/UL (ref 4.8–10.8)

## 2019-07-30 PROCEDURE — 36600 WITHDRAWAL OF ARTERIAL BLOOD: CPT

## 2019-07-30 PROCEDURE — 2500000003 HC RX 250 WO HCPCS: Performed by: INTERNAL MEDICINE

## 2019-07-30 PROCEDURE — 94150 VITAL CAPACITY TEST: CPT

## 2019-07-30 PROCEDURE — 6360000002 HC RX W HCPCS: Performed by: INTERNAL MEDICINE

## 2019-07-30 PROCEDURE — 83605 ASSAY OF LACTIC ACID: CPT

## 2019-07-30 PROCEDURE — 6370000000 HC RX 637 (ALT 250 FOR IP): Performed by: INTERNAL MEDICINE

## 2019-07-30 PROCEDURE — 84132 ASSAY OF SERUM POTASSIUM: CPT

## 2019-07-30 PROCEDURE — 85027 COMPLETE CBC AUTOMATED: CPT

## 2019-07-30 PROCEDURE — 99254 IP/OBS CNSLTJ NEW/EST MOD 60: CPT | Performed by: PSYCHIATRY & NEUROLOGY

## 2019-07-30 PROCEDURE — 84295 ASSAY OF SERUM SODIUM: CPT

## 2019-07-30 PROCEDURE — 82330 ASSAY OF CALCIUM: CPT

## 2019-07-30 PROCEDURE — 94640 AIRWAY INHALATION TREATMENT: CPT

## 2019-07-30 PROCEDURE — 85014 HEMATOCRIT: CPT

## 2019-07-30 PROCEDURE — 6360000002 HC RX W HCPCS

## 2019-07-30 PROCEDURE — 2580000003 HC RX 258: Performed by: INTERNAL MEDICINE

## 2019-07-30 PROCEDURE — 82803 BLOOD GASES ANY COMBINATION: CPT

## 2019-07-30 PROCEDURE — 6360000002 HC RX W HCPCS: Performed by: NURSE PRACTITIONER

## 2019-07-30 PROCEDURE — 2000000000 HC ICU R&B

## 2019-07-30 PROCEDURE — 82435 ASSAY OF BLOOD CHLORIDE: CPT

## 2019-07-30 PROCEDURE — 94667 MNPJ CHEST WALL 1ST: CPT

## 2019-07-30 PROCEDURE — 80048 BASIC METABOLIC PNL TOTAL CA: CPT

## 2019-07-30 PROCEDURE — 99233 SBSQ HOSP IP/OBS HIGH 50: CPT | Performed by: INTERNAL MEDICINE

## 2019-07-30 PROCEDURE — 82565 ASSAY OF CREATININE: CPT

## 2019-07-30 PROCEDURE — 94003 VENT MGMT INPAT SUBQ DAY: CPT

## 2019-07-30 PROCEDURE — 36415 COLL VENOUS BLD VENIPUNCTURE: CPT

## 2019-07-30 RX ORDER — QUETIAPINE FUMARATE 100 MG/1
100 TABLET, FILM COATED ORAL NIGHTLY
Status: DISCONTINUED | OUTPATIENT
Start: 2019-07-30 | End: 2019-08-02 | Stop reason: HOSPADM

## 2019-07-30 RX ORDER — MIDAZOLAM HYDROCHLORIDE 1 MG/ML
4 INJECTION INTRAMUSCULAR; INTRAVENOUS ONCE
Status: COMPLETED | OUTPATIENT
Start: 2019-07-30 | End: 2019-07-30

## 2019-07-30 RX ORDER — FUROSEMIDE 10 MG/ML
20 INJECTION INTRAMUSCULAR; INTRAVENOUS ONCE
Status: COMPLETED | OUTPATIENT
Start: 2019-07-30 | End: 2019-07-30

## 2019-07-30 RX ORDER — MIDAZOLAM HYDROCHLORIDE 1 MG/ML
INJECTION INTRAMUSCULAR; INTRAVENOUS
Status: COMPLETED
Start: 2019-07-30 | End: 2019-07-30

## 2019-07-30 RX ORDER — METOCLOPRAMIDE HYDROCHLORIDE 5 MG/ML
10 INJECTION INTRAMUSCULAR; INTRAVENOUS ONCE
Status: COMPLETED | OUTPATIENT
Start: 2019-07-30 | End: 2019-07-30

## 2019-07-30 RX ORDER — POLYETHYLENE GLYCOL 3350 17 G/17G
17 POWDER, FOR SOLUTION ORAL DAILY
Status: DISCONTINUED | OUTPATIENT
Start: 2019-07-30 | End: 2019-08-02

## 2019-07-30 RX ORDER — IPRATROPIUM BROMIDE AND ALBUTEROL SULFATE 2.5; .5 MG/3ML; MG/3ML
1 SOLUTION RESPIRATORY (INHALATION)
Status: DISCONTINUED | OUTPATIENT
Start: 2019-07-30 | End: 2019-08-01

## 2019-07-30 RX ADMIN — MIDAZOLAM HYDROCHLORIDE 4 MG: 1 INJECTION INTRAMUSCULAR; INTRAVENOUS at 16:02

## 2019-07-30 RX ADMIN — LORAZEPAM 2 MG: 2 INJECTION INTRAMUSCULAR; INTRAVENOUS at 15:26

## 2019-07-30 RX ADMIN — DEXMEDETOMIDINE 1 MCG/KG/HR: 100 INJECTION, SOLUTION, CONCENTRATE INTRAVENOUS at 22:21

## 2019-07-30 RX ADMIN — FAMOTIDINE 20 MG: 10 INJECTION, SOLUTION INTRAVENOUS at 08:42

## 2019-07-30 RX ADMIN — METOCLOPRAMIDE 10 MG: 5 INJECTION, SOLUTION INTRAMUSCULAR; INTRAVENOUS at 11:53

## 2019-07-30 RX ADMIN — POLYETHYLENE GLYCOL 3350 17 G: 17 POWDER, FOR SOLUTION ORAL at 11:53

## 2019-07-30 RX ADMIN — FAMOTIDINE 20 MG: 10 INJECTION, SOLUTION INTRAVENOUS at 21:08

## 2019-07-30 RX ADMIN — Medication 10 ML: at 08:44

## 2019-07-30 RX ADMIN — PIPERACILLIN SODIUM,TAZOBACTAM SODIUM 3.38 G: 3; .375 INJECTION, POWDER, FOR SOLUTION INTRAVENOUS at 14:24

## 2019-07-30 RX ADMIN — DOCUSATE SODIUM 100 MG: 50 LIQUID ORAL at 11:53

## 2019-07-30 RX ADMIN — MIDAZOLAM HYDROCHLORIDE 4 MG: 1 INJECTION, SOLUTION INTRAMUSCULAR; INTRAVENOUS at 16:02

## 2019-07-30 RX ADMIN — PIPERACILLIN SODIUM,TAZOBACTAM SODIUM 3.38 G: 3; .375 INJECTION, POWDER, FOR SOLUTION INTRAVENOUS at 06:15

## 2019-07-30 RX ADMIN — CEFTRIAXONE SODIUM 1 G: 1 INJECTION, POWDER, FOR SOLUTION INTRAMUSCULAR; INTRAVENOUS at 15:24

## 2019-07-30 RX ADMIN — LORAZEPAM 4 MG: 2 INJECTION INTRAMUSCULAR; INTRAVENOUS at 21:08

## 2019-07-30 RX ADMIN — FENTANYL CITRATE 150 MCG/HR: 50 INJECTION INTRAVENOUS at 05:02

## 2019-07-30 RX ADMIN — FUROSEMIDE 20 MG: 10 INJECTION, SOLUTION INTRAVENOUS at 11:53

## 2019-07-30 RX ADMIN — Medication 10 ML: at 20:05

## 2019-07-30 RX ADMIN — IPRATROPIUM BROMIDE AND ALBUTEROL SULFATE 1 AMPULE: .5; 3 SOLUTION RESPIRATORY (INHALATION) at 14:48

## 2019-07-30 RX ADMIN — LABETALOL 20 MG/4 ML (5 MG/ML) INTRAVENOUS SYRINGE 10 MG: at 22:22

## 2019-07-30 RX ADMIN — LORAZEPAM 2 MG: 2 INJECTION INTRAMUSCULAR; INTRAVENOUS at 17:08

## 2019-07-30 RX ADMIN — IPRATROPIUM BROMIDE AND ALBUTEROL SULFATE 1 AMPULE: .5; 3 SOLUTION RESPIRATORY (INHALATION) at 21:24

## 2019-07-30 RX ADMIN — LORAZEPAM 2 MG: 2 INJECTION INTRAMUSCULAR; INTRAVENOUS at 22:22

## 2019-07-30 RX ADMIN — CHLORHEXIDINE GLUCONATE 0.12% ORAL RINSE 15 ML: 1.2 LIQUID ORAL at 08:42

## 2019-07-30 RX ADMIN — CHLORDIAZEPOXIDE HYDROCHLORIDE 30 MG: 5 CAPSULE ORAL at 08:42

## 2019-07-30 RX ADMIN — ENOXAPARIN SODIUM 40 MG: 40 INJECTION SUBCUTANEOUS at 21:08

## 2019-07-30 RX ADMIN — PROPOFOL 45 MCG/KG/MIN: 10 INJECTION, EMULSION INTRAVENOUS at 01:52

## 2019-07-30 RX ADMIN — LORAZEPAM 4 MG: 2 INJECTION INTRAMUSCULAR; INTRAVENOUS at 20:04

## 2019-07-30 RX ADMIN — Medication 10 ML: at 08:43

## 2019-07-30 RX ADMIN — VANCOMYCIN HYDROCHLORIDE 1250 MG: 5 INJECTION, POWDER, LYOPHILIZED, FOR SOLUTION INTRAVENOUS at 08:45

## 2019-07-30 RX ADMIN — DEXMEDETOMIDINE 0.2 MCG/KG/HR: 100 INJECTION, SOLUTION, CONCENTRATE INTRAVENOUS at 16:19

## 2019-07-30 RX ADMIN — HYDRALAZINE HYDROCHLORIDE 20 MG: 20 INJECTION INTRAMUSCULAR; INTRAVENOUS at 23:10

## 2019-07-30 RX ADMIN — LORAZEPAM 2 MG: 2 INJECTION INTRAMUSCULAR; INTRAVENOUS at 14:24

## 2019-07-30 RX ADMIN — CLONIDINE HYDROCHLORIDE 0.2 MG: 0.2 TABLET ORAL at 03:15

## 2019-07-30 ASSESSMENT — PAIN SCALES - GENERAL
PAINLEVEL_OUTOF10: 0

## 2019-07-30 ASSESSMENT — PULMONARY FUNCTION TESTS
PIF_VALUE: 15
PIF_VALUE: 11
PIF_VALUE: 16
PIF_VALUE: 14
PIF_VALUE: 15
PIF_VALUE: 15
PIF_VALUE: 17
PIF_VALUE: 31
PIF_VALUE: 16
PIF_VALUE: 16
PIF_VALUE: 14
PIF_VALUE: 16
PIF_VALUE: 16
PIF_VALUE: 11
PIF_VALUE: 17
PIF_VALUE: 16
PIF_VALUE: 11
PIF_VALUE: 24

## 2019-07-30 ASSESSMENT — PAIN - FUNCTIONAL ASSESSMENT: PAIN_FUNCTIONAL_ASSESSMENT: ACTIVITIES ARE NOT PREVENTED

## 2019-07-30 NOTE — PROGRESS NOTES
Nutrition Assessment    Type and Reason for Visit: Reassess    Nutrition Recommendations: Start General diet when medically appropriate    Nutrition Assessment: Suspected poor nutritional status pta related to etoh abuse. At risk for nutritonal compromise due to NPO status post extubation. Will monitor nutrition progression    Malnutrition Assessment:  · Malnutrition Status: At risk for malnutrition  · Context: Social or environmental circumstances  · Findings of the 6 clinical characteristics of malnutrition (Minimum of 2 out of 6 clinical characteristics is required to make the diagnosis of moderate or severe Protein Calorie Malnutrition based on AND/ASPEN Guidelines):  1. Energy Intake-Unable to assess, Unable to assess    2. Weight Loss-Unable to assess,    3. Fat Loss-Mild subcutaneous fat loss(unable to fully assess due to ICU constraints), Orbital  4. Muscle Loss-Mild muscle mass loss(unable to fully assess due to ICU constraints), Temples (temporalis muscle), Clavicles (pectoralis and deltoids)  5. Fluid Accumulation-No significant fluid accumulation,    6.  Strength-Not measured    Nutrition Risk Level: High    Nutrient Needs:  · Estimated Daily Total Kcal: 7969-9297 -(kg x 28)-30  · Estimated Daily Protein (g): 72-87 (kg IBW x 1.0-1.2)  · Estimated Daily Total Fluid (ml/day): ~2100 ml    Nutrition Diagnosis:   · Problem: Inadequate oral intake  · Etiology: related to Other (Comment)(S/P extubation)     Signs and symptoms:  as evidenced by NPO status due to medical condition    Objective Information:  · Nutrition-Focused Physical Findings: Hx heavy ETOH. PICC line. No edema noted.  No BM, colace/miralax ordered today (7/30)  · Wound Type: None  · Current Nutrition Therapies:  · Oral Diet Orders: NPO(7/25)   · Anthropometric Measures:  · Ht: 5' 9\" (175.3 cm)(per EMR)   · Current Body Wt: 174 lb (78.9 kg)(7/30)  · Admission Body Wt: 160 lb (72.6 kg)  · Usual Body Wt: 150 lb (68 kg)(2017)  · % Weight

## 2019-07-30 NOTE — PROGRESS NOTES
Pharmacy Vancomycin Consult     Vancomycin Day: 3  Current Dosin mg IV every 12 hours    Temp max:  98.5    Recent Labs     19  0452 19  0529   BUN 9 9       Recent Labs     19  0452 19  0529   CREATININE 0.47* 0.49*       Recent Labs     19  0453 19  0530   WBC 12.4* 6.8         Intake/Output Summary (Last 24 hours) at 2019 0901  Last data filed at 2019 0557  Gross per 24 hour   Intake 2289 ml   Output 3300 ml   Net -1011 ml       Culture Date      Source                       Results  [facility] #[id number] (UD) (41 y.o. M) (Adm: 19)   [facility] [id number]   Results     Procedure Component Value Units Date/Time   Respiratory Culture [[id number]] Collected: 19 1132   Order Status: Completed Specimen: Sputum from Endotracheal Updated: 19 1119    CULTURE, RESPIRATORY Usual respiratory mark in 24 hrs    Gram Stain Result Many WBC's   No epithelial cells   Moderate Mixed Respiratory Mark    Narrative:     ORDER#: [id number]                          ORDERED BY: Alexsander Moeller  SOURCE: Endotracheal Sputum                COLLECTED:  19 11:32  ANTIBIOTICS AT TRUDY.:                      RECEIVED :  19 11:32   Urine Culture [[id number]] Collected: 19 2335   Order Status: Completed Specimen: Urine, clean catch Updated: 19 0839    Urine Culture, Routine No growth 24 hours   Narrative:     ORDER#: [id number]                          ORDERED BY: Alessandra Galdamez  SOURCE: Urine Clean Catch                  COLLECTED:  19 23:35  ANTIBIOTICS AT TRUDY.:                      RECEIVED :  19 23:35   CULTURE BLOOD #1 [[id number]] Collected: 19 2150   Order Status: Completed Specimen: Blood Updated: 19 0615    Blood Culture, Routine No Growth to date. Any change in status will be called.    Narrative:     ORDER#: [id number]                          ORDERED BY: Alessandra Galdamez  SOURCE: Blood Blood
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
## 2019-07-30 NOTE — PROGRESS NOTES
Uneventful shift. Monitor has been sinus rhythm without ectopy. Vital signs normal.  Adequately-sedated on ventilator. Followed commands at 0400 during brief propofol vacation.

## 2019-07-30 NOTE — PROGRESS NOTES
Pulmonary & Critical Care Medicine ICU Progress Note  Chief complaint : Acute resp failure, acute encephalopathy and DT     Subjunctive/24 hour events :   Patient seen and examined during multidisciplinary rounds with RN, charge nurse, RT, pharmacy, dietitian, and social service. Awake, follows commands, no distress, tolerating CPAP trial, no fever overnight, following commands, urine output is good, no bowel movement, blood sugar is controlled, residuals to 50 cc an tube feed currently on hold  Social History     Tobacco Use    Smoking status: Current Every Day Smoker     Packs/day: 2.00     Years: 25.00     Pack years: 50.00     Types: Cigarettes    Smokeless tobacco: Former User   Substance Use Topics    Alcohol use: Yes     Comment: 2-3 times a week          Problem Relation Age of Onset    Mental Illness Mother        Recent Labs     07/30/19  1208   PHART 7.416   [id number] 46*   PO2ART 76*       MV Settings:  Vent Mode: (EXTUBATED PT ) Rate Set: 20 bmp/Vt Ordered: 450 mL/ /FiO2 : 30 %           IV:   fentaNYL (SUBLIMAZE) infusion 50 mcg/hr (07/30/19 1156)    propofol 50 mcg/kg/min (07/30/19 0700)       Vitals:  /82   Pulse 113   Temp 98.6 °F (37 °C) (Oral)   Resp 19   Ht 5' 9\" (1.753 m) Comment: per EMR  Wt 174 lb 9.7 oz (79.2 kg)   SpO2 97%   BMI 25.78 kg/m²    Tmax:        Intake/Output Summary (Last 24 hours) at 7/30/2019 1248  Last data filed at 7/30/2019 0557  Gross per 24 hour   Intake 2189 ml   Output 1900 ml   Net 289 ml       EXAM:  General: Awake and following commands  Head: normocephalic, atraumatic  Eyes:No gross abnormalities. and PERRL  ENT:  MMM no lesions, ET and OG tube in  Neck:  supple and no masses  Chest : clear to auscultation bilaterally- no wheezes, rales or rhonchi, normal air movement, no respiratory distress  Heart[de-identified] Heart sounds are normal.  Regular rate and rhythm without murmur, gallop or rub.   ABD:  symmetric, soft, non-tender  Musculoskeletal : no cyanosis, PHUR 6.0   WBCUA 3-5   RBCUA 20-50*   BACTERIA Negative   CLARITYU Clear   SPECGRAV 1.027   LEUKOCYTESUR TRACE*   UROBILINOGEN 1.0   BILIRUBINUR Negative   BLOODU TRACE*   GLUCOSEU Negative       Cultures:  Respiratory culture group B strep  Films:  CXR reviewed by me and it showed possible right lower lobe infiltrate versus atelectasis      Assessment: This is a critically ill patient at risk of deterioration / death , needing close ICU monitoring and intervention due to below noted problems   · Acute hypoxic and hypercapnic respiratory failure secondary to alcohol intoxication with possible  · Group B strep pneumonia  · Acute encephalopathy secondary to sepsis/acute illness possible withdrawal, improved    Recommendation  · Extubate today  · Watch for ICU delirium: TV on, natural light, avoid benzos, pain control, early mobility, and family engagement  · PUD prophylaxis  · DVT prophylaxis  · Reglan 10 mg x 1  · Lasix 20 mg x 1  · Stop sedation  · Speech eval later today  · Target blood sugar 140-180  · Watch volume status and avoid overload  · Continue Zosyn  · Resume Seroquel  · Resume beta-blockers later today  · DC vancomycin  · Incentive spirometry and flutter  · DuoNeb  · Ambulate        Due to the immediate potential for life-threatening deterioration due to acute respiratory failure I spent 40  minutes providing critical care.  This time is excluding time spent performing procedures.           Electronically signed by Kelechi Bernabe MD,  FCCP ,on 7/30/2019 at 12:48 PM

## 2019-07-30 NOTE — PROGRESS NOTES
Department of Internal Medicine  Progress Note      SUBJECTIVE:   No acute events overnight.        ROS:  All 12 systems reviewed and negative except mentioned in HPI and Assessment and plan    MEDICATIONS:  Current Facility-Administered Medications   Medication Dose Route Frequency Provider Last Rate Last Dose    QUEtiapine (SEROQUEL) tablet 100 mg  100 mg Oral Nightly Liza Muir MD        docusate (COLACE) 50 MG/5ML liquid 100 mg  100 mg Oral BID Swathi Clinton MD   100 mg at 07/30/19 1153    polyethylene glycol (GLYCOLAX) packet 17 g  17 g Oral Daily Swathi Clinton MD   17 g at 07/30/19 1153    ipratropium-albuterol (DUONEB) nebulizer solution 1 ampule  1 ampule Inhalation Q6H WA Swathi Clinton MD   1 ampule at 07/30/19 1448    dexmedetomidine (PRECEDEX) 400 mcg in sodium chloride 0.9 % 100 mL infusion  0.2 mcg/kg/hr Intravenous Continuous Kaela Guy MD        cefTRIAXone (ROCEPHIN) 1 g IVPB in 50 mL D5W minibag  1 g Intravenous Q24H Kaela Siddiqi  mL/hr at 07/30/19 1524 1 g at 07/30/19 1524    sodium chloride flush 0.9 % injection 10 mL  10 mL Intravenous 2 times per day Swathi Clinton MD   10 mL at 07/30/19 0843    sodium chloride flush 0.9 % injection 10 mL  10 mL Intravenous PRN Swathi Clinton MD        chlordiazePOXIDE (LIBRIUM) capsule 30 mg  30 mg Oral TID Swathi Clinton MD   30 mg at 07/30/19 0842    labetalol (NORMODYNE;TRANDATE) injection syringe 10 mg  10 mg Intravenous Q4H PRN Adalberto Perla MD   10 mg at 07/27/19 0925    hydrALAZINE (APRESOLINE) injection 20 mg  20 mg Intravenous Q4H PRN Adrian Chan MD   20 mg at 07/27/19 1317    acetaminophen (TYLENOL) tablet 650 mg  650 mg Oral Q4H PRN Adalberot Perla MD   650 mg at 07/27/19 2143    sodium chloride flush 0.9 % injection 10 mL  10 mL Intravenous 2 times per day Enrico Francis MD   10 mL at 07/30/19 0844    sodium chloride flush 0.9 % injection 10 mL  10 mL Intravenous PRN Enrico Francis MD       [facility] LORazepam (ATIVAN) tablet 1 mg  1 mg Oral Q1H PRN Samer Kimmy Fontanez MD        Or    LORazepam (ATIVAN) injection 1 mg  1 mg Intravenous Q1H PRN Samer Kimmy Fontanez MD   1 mg at 07/28/19 0043    Or    LORazepam (ATIVAN) tablet 2 mg  2 mg Oral Q1H PRN Samer Kimmy Fontanez MD        Or    LORazepam (ATIVAN) injection 2 mg  2 mg Intravenous Q1H PRN Samer Kimmy Fontanez MD   2 mg at 07/30/19 1526    Or    LORazepam (ATIVAN) tablet 3 mg  3 mg Oral Q1H PRN Samer Kimmy Fontanez MD        Or    LORazepam (ATIVAN) injection 3 mg  3 mg Intravenous Q1H PRN Samer Kimmy Fontanez MD   3 mg at 07/27/19 2158    Or    LORazepam (ATIVAN) tablet 4 mg  4 mg Oral Q1H PRN Samer Kimmy Fontanez MD        Or    LORazepam (ATIVAN) injection 4 mg  4 mg Intravenous Q1H PRN Samer Kimmy Fontanez MD        ondansetron [facility] injection 4 mg  4 mg Intravenous Q6H PRN Davida Filler, APRN - CNP        enoxaparin (LOVENOX) injection 40 mg  40 mg Subcutaneous Daily Davida Filler, APRN - CNP   40 mg at 07/29/19 2053    acetaminophen (TYLENOL) suppository 650 mg  650 mg Rectal Q4H PRN Davida Filler, APRN - CNP        potassium chloride (KLOR-CON M) extended release tablet 40 mEq  40 mEq Oral PRN Earmon [place] Sedar, DO        Or    potassium chloride (KLOR-CON) packet 40 mEq  40 mEq Oral PRN Earmon Aysha Sedar, DO        Or    potassium chloride 10 mEq/100 mL IVPB (Peripheral Line)  10 mEq Intravenous PRN Earmon Aysha Sedar, DO        famotidine (PEPCID) injection 20 mg  20 mg Intravenous BID Kelechi Bernabe MD   20 mg at 07/30/19 0842    chlorhexidine (PERIDEX) 0.12 % solution 15 mL  15 mL Mouth/Throat BID Kelechi Bernabe MD   15 mL at 07/30/19 0842         OBJECTIVE:  Vital Signs:  Vitals:    07/30/19 1453   BP:    Pulse:    Resp:    Temp:    SpO2: 92%       Focal exam:      General Exam (except as mentioned above):  CONSTITUTIONAL: Awake, alert, no apparent distress  EYES:  PERRL, conjunctiva normal  ENT:  Normocephalic, atraumatic  NECK:  Supple  BACK:  Symmetric  LUNGS:

## 2019-07-30 NOTE — PLAN OF CARE
Nutrition Problem: Inadequate oral intake  Intervention: Food and/or Nutrient Delivery: Start oral diet(Start General diet when medically appropriate)  Nutritional Goals: initiation of oral diet with intake > 85% of meals and stable weight ~ 160 lb

## 2019-07-31 ENCOUNTER — APPOINTMENT (OUTPATIENT)
Dept: GENERAL RADIOLOGY | Age: 44
DRG: 207 | End: 2019-07-31

## 2019-07-31 LAB
ANION GAP SERPL CALCULATED.3IONS-SCNC: 13 MEQ/L (ref 9–15)
BUN BLDV-MCNC: 8 MG/DL (ref 6–20)
CALCIUM SERPL-MCNC: 9.2 MG/DL (ref 8.5–9.9)
CHLORIDE BLD-SCNC: 100 MEQ/L (ref 95–107)
CO2: 27 MEQ/L (ref 20–31)
CREAT SERPL-MCNC: 0.47 MG/DL (ref 0.7–1.2)
GFR AFRICAN AMERICAN: >60
GFR NON-AFRICAN AMERICAN: >60
GLUCOSE BLD-MCNC: 100 MG/DL (ref 70–99)
HCT VFR BLD CALC: 38.3 % (ref 42–52)
HEMOGLOBIN: 13.6 G/DL (ref 14–18)
MCH RBC QN AUTO: 35.1 PG (ref 27–31.3)
MCHC RBC AUTO-ENTMCNC: 35.4 % (ref 33–37)
MCV RBC AUTO: 99.1 FL (ref 80–100)
PDW BLD-RTO: 12.7 % (ref 11.5–14.5)
PLATELET # BLD: 212 K/UL (ref 130–400)
POTASSIUM REFLEX MAGNESIUM: 3.8 MEQ/L (ref 3.4–4.9)
RBC # BLD: 3.87 M/UL (ref 4.7–6.1)
SODIUM BLD-SCNC: 140 MEQ/L (ref 135–144)
WBC # BLD: 8.9 K/UL (ref 4.8–10.8)

## 2019-07-31 PROCEDURE — 94761 N-INVAS EAR/PLS OXIMETRY MLT: CPT

## 2019-07-31 PROCEDURE — 2580000003 HC RX 258: Performed by: INTERNAL MEDICINE

## 2019-07-31 PROCEDURE — 6360000002 HC RX W HCPCS: Performed by: INTERNAL MEDICINE

## 2019-07-31 PROCEDURE — 6360000002 HC RX W HCPCS: Performed by: NURSE PRACTITIONER

## 2019-07-31 PROCEDURE — 71045 X-RAY EXAM CHEST 1 VIEW: CPT

## 2019-07-31 PROCEDURE — 2000000000 HC ICU R&B

## 2019-07-31 PROCEDURE — 85027 COMPLETE CBC AUTOMATED: CPT

## 2019-07-31 PROCEDURE — 80048 BASIC METABOLIC PNL TOTAL CA: CPT

## 2019-07-31 PROCEDURE — 2500000003 HC RX 250 WO HCPCS: Performed by: INTERNAL MEDICINE

## 2019-07-31 PROCEDURE — 6370000000 HC RX 637 (ALT 250 FOR IP): Performed by: INTERNAL MEDICINE

## 2019-07-31 PROCEDURE — 94640 AIRWAY INHALATION TREATMENT: CPT

## 2019-07-31 PROCEDURE — 2700000000 HC OXYGEN THERAPY PER DAY

## 2019-07-31 PROCEDURE — 99233 SBSQ HOSP IP/OBS HIGH 50: CPT | Performed by: INTERNAL MEDICINE

## 2019-07-31 RX ORDER — ACETAMINOPHEN 80 MG
TABLET,CHEWABLE ORAL ONCE
Status: COMPLETED | OUTPATIENT
Start: 2019-07-31 | End: 2019-07-31

## 2019-07-31 RX ADMIN — DEXMEDETOMIDINE 1 MCG/KG/HR: 100 INJECTION, SOLUTION, CONCENTRATE INTRAVENOUS at 04:15

## 2019-07-31 RX ADMIN — DEXMEDETOMIDINE 1 MCG/KG/HR: 100 INJECTION, SOLUTION, CONCENTRATE INTRAVENOUS at 14:20

## 2019-07-31 RX ADMIN — DEXMEDETOMIDINE 1 MCG/KG/HR: 100 INJECTION, SOLUTION, CONCENTRATE INTRAVENOUS at 09:03

## 2019-07-31 RX ADMIN — ENOXAPARIN SODIUM 40 MG: 40 INJECTION SUBCUTANEOUS at 21:38

## 2019-07-31 RX ADMIN — Medication: at 13:13

## 2019-07-31 RX ADMIN — HYDRALAZINE HYDROCHLORIDE 20 MG: 20 INJECTION INTRAMUSCULAR; INTRAVENOUS at 04:37

## 2019-07-31 RX ADMIN — IPRATROPIUM BROMIDE AND ALBUTEROL SULFATE 1 AMPULE: .5; 3 SOLUTION RESPIRATORY (INHALATION) at 11:42

## 2019-07-31 RX ADMIN — Medication 10 ML: at 09:00

## 2019-07-31 RX ADMIN — METOPROLOL TARTRATE 12.5 MG: 25 TABLET ORAL at 21:39

## 2019-07-31 RX ADMIN — IPRATROPIUM BROMIDE AND ALBUTEROL SULFATE 1 AMPULE: .5; 3 SOLUTION RESPIRATORY (INHALATION) at 20:26

## 2019-07-31 RX ADMIN — CEFTRIAXONE SODIUM 1 G: 1 INJECTION, POWDER, FOR SOLUTION INTRAMUSCULAR; INTRAVENOUS at 15:44

## 2019-07-31 RX ADMIN — FAMOTIDINE 20 MG: 10 INJECTION, SOLUTION INTRAVENOUS at 21:39

## 2019-07-31 RX ADMIN — CHLORDIAZEPOXIDE HYDROCHLORIDE 30 MG: 5 CAPSULE ORAL at 21:37

## 2019-07-31 RX ADMIN — DEXMEDETOMIDINE 1 MCG/KG/HR: 100 INJECTION, SOLUTION, CONCENTRATE INTRAVENOUS at 19:46

## 2019-07-31 RX ADMIN — LORAZEPAM 4 MG: 2 INJECTION INTRAMUSCULAR; INTRAVENOUS at 04:37

## 2019-07-31 RX ADMIN — CHLORDIAZEPOXIDE HYDROCHLORIDE 5 MG: 5 CAPSULE ORAL at 13:09

## 2019-07-31 RX ADMIN — Medication 10 ML: at 21:42

## 2019-07-31 RX ADMIN — LORAZEPAM 2 MG: 2 INJECTION INTRAMUSCULAR; INTRAVENOUS at 08:01

## 2019-07-31 RX ADMIN — CHLORHEXIDINE GLUCONATE 0.12% ORAL RINSE 15 ML: 1.2 LIQUID ORAL at 08:26

## 2019-07-31 RX ADMIN — FAMOTIDINE 20 MG: 10 INJECTION, SOLUTION INTRAVENOUS at 08:26

## 2019-07-31 RX ADMIN — QUETIAPINE FUMARATE 100 MG: 100 TABLET ORAL at 21:39

## 2019-07-31 RX ADMIN — METOPROLOL TARTRATE 12.5 MG: 25 TABLET ORAL at 13:09

## 2019-07-31 RX ADMIN — IPRATROPIUM BROMIDE AND ALBUTEROL SULFATE 1 AMPULE: .5; 3 SOLUTION RESPIRATORY (INHALATION) at 16:12

## 2019-07-31 RX ADMIN — IPRATROPIUM BROMIDE AND ALBUTEROL SULFATE 1 AMPULE: .5; 3 SOLUTION RESPIRATORY (INHALATION) at 03:55

## 2019-07-31 RX ADMIN — LORAZEPAM 4 MG: 2 INJECTION INTRAMUSCULAR; INTRAVENOUS at 00:20

## 2019-07-31 RX ADMIN — DOCUSATE SODIUM 100 MG: 50 LIQUID ORAL at 08:26

## 2019-07-31 ASSESSMENT — PAIN SCALES - GENERAL
PAINLEVEL_OUTOF10: 0

## 2019-07-31 ASSESSMENT — PAIN - FUNCTIONAL ASSESSMENT
PAIN_FUNCTIONAL_ASSESSMENT: ACTIVITIES ARE NOT PREVENTED
PAIN_FUNCTIONAL_ASSESSMENT: ACTIVITIES ARE NOT PREVENTED

## 2019-07-31 ASSESSMENT — PAIN SCALES - WONG BAKER
WONGBAKER_NUMERICALRESPONSE: 0

## 2019-07-31 NOTE — PROGRESS NOTES
[facility]45 [facility] called  Patient was cursing and pulling at restraints. Patient stating he was getting out of restraints and leaving the building. Patient was coughing and spitting. [facility] called patient given 2 mg ativan per CIWA protocol. [address] [place] with his mother [facility]. Sugested that visitation would not be good idea right now due to the aggression. Brother will be coming soon to see him. Family is aware of current situation and restraints at this time. .   1100 discontinued ankle restraints. Patient remains 1:1 at this time.

## 2019-07-31 NOTE — PROGRESS NOTES
no edema  Neuro: Grossly normal  Skin: No rashes or nodules noted. Lymph node:  no cervical nodes  Urology: Yes Serrano   Psychiatric: Currently, just received Ativan    Medications:  Scheduled Meds:   QUEtiapine  100 mg Oral Nightly    docusate  100 mg Oral BID    polyethylene glycol  17 g Oral Daily    ipratropium-albuterol  1 ampule Inhalation Q6H WA    cefTRIAXone (ROCEPHIN) IV  1 g Intravenous Q24H    sodium chloride flush  10 mL Intravenous 2 times per day    chlordiazePOXIDE  30 mg Oral TID    enoxaparin  40 mg Subcutaneous Daily    famotidine (PEPCID) injection  20 mg Intravenous BID    chlorhexidine  15 mL Mouth/Throat BID       PRN Meds:  sodium chloride flush, labetalol, hydrALAZINE, acetaminophen, LORazepam **OR** LORazepam **OR** LORazepam **OR** LORazepam **OR** LORazepam **OR** LORazepam **OR** LORazepam **OR** LORazepam, ondansetron, acetaminophen, potassium chloride **OR** potassium alternative oral replacement **OR** potassium chloride    Results: reviewed by me   CBC:   Recent Labs     07/29/19  0453 07/30/19  0530 07/30/19  1208 07/31/19  0519   WBC 12.4* 6.8  --  8.9   HGB 12.4* 12.4* 14.1 13.6*   HCT 36.7* 35.6*  --  38.3*   .2* 99.2  --  99.1    166  --  212     BMP:   Recent Labs     07/29/19  0452 07/30/19  0529 07/30/19  1208 07/31/19  0524    139  --  140   K 4.0 4.1  --  3.8    100  --  100   CO2 25 29  --  27   BUN 9 9  --  8   CREATININE 0.47* 0.49* 0.7* 0.47*     LIVER PROFILE: No results for input(s): AST, ALT, LIPASE, BILIDIR, BILITOT, ALKPHOS in the last 72 hours. Invalid input(s): AMYLASE,  ALB  PT/INR: No results for input(s): PROTIME, INR in the last 72 hours. APTT: No results for input(s): APTT in the last 72 hours.   UA:  No results for input(s): NITRITE, COLORU, PHUR, LABCAST, WBCUA, RBCUA, MUCUS, TRICHOMONAS, YEAST, BACTERIA, CLARITYU, SPECGRAV, LEUKOCYTESUR, UROBILINOGEN, BILIRUBINUR, BLOODU, GLUCOSEU, AMORPHOUS in the last 72

## 2019-07-31 NOTE — PROGRESS NOTES
[place] Bashir care of the patient. Patient alert to self, following simple commands, and is calm and cooperative at the present time. Remains a PCA1:1 Family at bedside. All restraints DCed by previous nurse and patient clemente well at present time. Will cont  to monitor.

## 2019-07-31 NOTE — PROGRESS NOTES
Department of Internal Medicine  Progress Note      SUBJECTIVE: Patient is alert and awake. Remained in 4 point restraints overnight due to violent behavior. On precedex drip . Afebrile and HDS. On VM. Moving all 4 extremities. UO adequate. No acute events overnight.        ROS:  All 12 systems reviewed and negative except mentioned in HPI and Assessment and plan    MEDICATIONS:  Current Facility-Administered Medications   Medication Dose Route Frequency Provider Last Rate Last Dose    metoprolol tartrate (LOPRESSOR) tablet 12.5 mg  12.5 mg Oral BID Luep Santiago MD        pill splitter   Does not apply Once Lupe Santiago MD        QUEtiapine (SEROQUEL) tablet 100 mg  100 mg Oral Nightly Liza Muir MD        docusate (COLACE) 50 MG/5ML liquid 100 mg  100 mg Oral BID Lupe Santiago MD   100 mg at 07/31/19 0826    polyethylene glycol (GLYCOLAX) packet 17 g  17 g Oral Daily Lupe Santiago MD   17 g at 07/30/19 1153    ipratropium-albuterol (DUONEB) nebulizer solution 1 ampule  1 ampule Inhalation Q6H WA Liza Sumner MD   1 ampule at 07/31/19 1142    dexmedetomidine (PRECEDEX) 400 mcg in sodium chloride 0.9 % 100 mL infusion  0.2 mcg/kg/hr Intravenous Continuous Kaela Salazar MD 19.8 mL/hr at 07/31/19 0903 1 mcg/kg/hr at 07/31/19 0903    cefTRIAXone (ROCEPHIN) 1 g IVPB in 50 mL D5W minibag  1 g Intravenous Q24H Kaela Salazar MD   Stopped at 07/30/19 1624    sodium chloride flush 0.9 % injection 10 mL  10 mL Intravenous 2 times per day Lupe Santiago MD   10 mL at 07/30/19 2005    sodium chloride flush 0.9 % injection 10 mL  10 mL Intravenous PRN Liza Muir MD        chlordiazePOXIDE (LIBRIUM) capsule 30 mg  30 mg Oral TID Lupe Santiago MD   30 mg at 07/30/19 0842    labetalol (NORMODYNE;TRANDATE) injection syringe 10 mg  10 mg Intravenous Q4H PRN Carlor Arcelia Cardona MD   10 mg at 07/30/19 2222    hydrALAZINE (APRESOLINE) injection 20 mg  20 mg Intravenous Q4H PRN Loree Hortencia, MD   20 mg at 07/31/19 0437    acetaminophen (TYLENOL) tablet 650 mg  650 mg Oral Q4H PRN Adalberto Greenfield MD   650 mg at 07/27/19 2143    LORazepam (ATIVAN) tablet 1 mg  1 mg Oral Q1H PRN Adalberto Greenfield MD        Or    LORazepam (ATIVAN) injection 1 mg  1 mg Intravenous Q1H PRN Adalberto Greenfield MD   1 mg at 07/28/19 0043    Or    LORazepam (ATIVAN) tablet 2 mg  2 mg Oral Q1H PRN Adalberto Greenfield MD        Or    LORazepam (ATIVAN) injection 2 mg  2 mg Intravenous Q1H PRN Adalberto Greenfield MD   2 mg at 07/31/19 0801    Or    LORazepam (ATIVAN) tablet 3 mg  3 mg Oral Q1H PRN Adalberto Greenfield MD        Or    LORazepam (ATIVAN) injection 3 mg  3 mg Intravenous Q1H PRN Adalberto Greenfield MD   3 mg at 07/27/19 2158    Or    LORazepam (ATIVAN) tablet 4 mg  4 mg Oral Q1H PRN Adalberto Greenfield MD        Or    LORazepam (ATIVAN) injection 4 mg  4 mg Intravenous Q1H PRN Adalberto Greenfield MD   4 mg at 07/31/19 0437    ondansetron (ZOFRAN) injection 4 mg  4 mg Intravenous Q6H PRN FELIPA Michael CNP        enoxaparin (LOVENOX) injection 40 mg  40 mg Subcutaneous Daily FELIPA Michael - CNP   40 mg at 07/30/19 2108    acetaminophen (TYLENOL) suppository 650 mg  650 mg Rectal Q4H PRN FELIPA Michael CNP        potassium chloride (KLOR-CON M) extended release tablet 40 mEq  40 mEq Oral PRN Lenn Pique Sedar, DO        Or    potassium chloride (KLOR-CON) packet 40 mEq  40 mEq Oral PRN Lenn Pique Sedar, DO        Or    potassium chloride 10 mEq/100 mL IVPB (Peripheral Line)  10 mEq Intravenous PRN Lenn Pique Sedar, DO        famotidine (PEPCID) injection 20 mg  20 mg Intravenous BID Armida Ashraf MD   20 mg at 07/31/19 0826    chlorhexidine (PERIDEX) 0.12 % solution 15 mL  15 mL Mouth/Throat BID Armida Ashraf MD   15 mL at 07/31/19 0826         OBJECTIVE:  Vital Signs:  Vitals:    07/31/19 1200   BP: (!) 146/87   Pulse: 102   Resp: 23   Temp: 98 °F (36.7 °C)   SpO2: 98%       Focal exam:      General Exam (except as mentioned above):  CONSTITUTIONAL: Awake, alert, no apparent distress  EYES:  PERRL, conjunctiva normal  ENT:  Normocephalic, atraumatic  NECK:  Supple  BACK:  Symmetric  LUNGS:  CTAB except bilateral basilar crackles. CARDIOVASCULAR:  S1S2 present  ABDOMEN:  soft, non-distended, non-tender  MUSCULOSKELETAL:  There is no redness, warmth, or swelling of the joints. NEUROLOGIC:  Alert, awake, oriented x 3. No FND  EXTREMITIES: Warm and well perfused. LABS  Recent Labs     07/29/19  0453 07/30/19  0530 07/30/19  1208 07/31/19  0519   WBC 12.4* 6.8  --  8.9   RBC 3.63* 3.59*  --  3.87*   HGB 12.4* 12.4* 14.1 13.6*   HCT 36.7* 35.6*  --  38.3*   .2* 99.2  --  99.1   MCH 34.2* 34.6*  --  35.1*   MCHC 33.8 34.9  --  35.4   RDW 13.6 13.4  --  12.7    166  --  212       Recent Labs     07/29/19  0452 07/30/19  0529 07/30/19  1208 07/31/19  0524    139  --  140   K 4.0 4.1  --  3.8    100  --  100   CO2 25 29  --  27   BUN 9 9  --  8   CREATININE 0.47* 0.49* 0.7* 0.47*   GLUCOSE 99 85  --  100*   CALCIUM 8.0* 8.4*  --  9.2       No results for input(s): MG in the last 72 hours. ASSESSMENT AND PLAN    Active Hospital Problems    Diagnosis Date Noted    Acute alcoholic intoxication with complication ([facility] [Y15.933] 07/25/2019     - Acute hypoxic respiratory failure: Secondary to strep Pneumoniae. Extubated. Doing well    - Streptococcal Pneumonia: Zosyn changed to ceftriaxone    - Alcohol withdrawal: Started on precedex due to agitation, patient refusing pills.  Continue Ativan and Librium    - Acute metabolic encephalopathy: Secondary to sepsis and alcohol intoxication and withdrawal.     DVT prophylaxis: Lovenox  Elizabeth Gaona MD  Pager : [phone]

## 2019-08-01 LAB
ANION GAP SERPL CALCULATED.3IONS-SCNC: 13 MEQ/L (ref 9–15)
BUN BLDV-MCNC: 10 MG/DL (ref 6–20)
CALCIUM SERPL-MCNC: 9.1 MG/DL (ref 8.5–9.9)
CHLORIDE BLD-SCNC: 101 MEQ/L (ref 95–107)
CO2: 28 MEQ/L (ref 20–31)
CREAT SERPL-MCNC: 0.56 MG/DL (ref 0.7–1.2)
CULTURE, RESPIRATORY: ABNORMAL
GFR AFRICAN AMERICAN: >60
GFR NON-AFRICAN AMERICAN: >60
GLUCOSE BLD-MCNC: 100 MG/DL (ref 70–99)
GRAM STAIN RESULT: ABNORMAL
HCT VFR BLD CALC: 40.4 % (ref 42–52)
HEMOGLOBIN: 14.2 G/DL (ref 14–18)
MCH RBC QN AUTO: 34.5 PG (ref 27–31.3)
MCHC RBC AUTO-ENTMCNC: 35.3 % (ref 33–37)
MCV RBC AUTO: 97.8 FL (ref 80–100)
ORGANISM: ABNORMAL
ORGANISM: ABNORMAL
PDW BLD-RTO: 13.2 % (ref 11.5–14.5)
PLATELET # BLD: 239 K/UL (ref 130–400)
POTASSIUM REFLEX MAGNESIUM: 3.6 MEQ/L (ref 3.4–4.9)
RBC # BLD: 4.13 M/UL (ref 4.7–6.1)
SODIUM BLD-SCNC: 142 MEQ/L (ref 135–144)
VANCOMYCIN TROUGH: <4 UG/ML (ref 10–20)
WBC # BLD: 9.5 K/UL (ref 4.8–10.8)

## 2019-08-01 PROCEDURE — 2500000003 HC RX 250 WO HCPCS: Performed by: INTERNAL MEDICINE

## 2019-08-01 PROCEDURE — 2580000003 HC RX 258: Performed by: INTERNAL MEDICINE

## 2019-08-01 PROCEDURE — 6370000000 HC RX 637 (ALT 250 FOR IP): Performed by: INTERNAL MEDICINE

## 2019-08-01 PROCEDURE — 6360000002 HC RX W HCPCS: Performed by: INTERNAL MEDICINE

## 2019-08-01 PROCEDURE — 94150 VITAL CAPACITY TEST: CPT

## 2019-08-01 PROCEDURE — 99232 SBSQ HOSP IP/OBS MODERATE 35: CPT | Performed by: PSYCHIATRY & NEUROLOGY

## 2019-08-01 PROCEDURE — 99232 SBSQ HOSP IP/OBS MODERATE 35: CPT | Performed by: INTERNAL MEDICINE

## 2019-08-01 PROCEDURE — 80202 ASSAY OF VANCOMYCIN: CPT

## 2019-08-01 PROCEDURE — 80048 BASIC METABOLIC PNL TOTAL CA: CPT

## 2019-08-01 PROCEDURE — 97162 PT EVAL MOD COMPLEX 30 MIN: CPT

## 2019-08-01 PROCEDURE — 1210000000 HC MED SURG R&B

## 2019-08-01 PROCEDURE — 94640 AIRWAY INHALATION TREATMENT: CPT

## 2019-08-01 PROCEDURE — 97166 OT EVAL MOD COMPLEX 45 MIN: CPT

## 2019-08-01 PROCEDURE — 94760 N-INVAS EAR/PLS OXIMETRY 1: CPT

## 2019-08-01 PROCEDURE — 85027 COMPLETE CBC AUTOMATED: CPT

## 2019-08-01 PROCEDURE — 94668 MNPJ CHEST WALL SBSQ: CPT

## 2019-08-01 PROCEDURE — 94664 DEMO&/EVAL PT USE INHALER: CPT

## 2019-08-01 PROCEDURE — 6360000002 HC RX W HCPCS: Performed by: NURSE PRACTITIONER

## 2019-08-01 RX ORDER — HYDROCHLOROTHIAZIDE 12.5 MG/1
12.5 TABLET ORAL DAILY
Status: DISCONTINUED | OUTPATIENT
Start: 2019-08-01 | End: 2019-08-02 | Stop reason: HOSPADM

## 2019-08-01 RX ORDER — CHLORDIAZEPOXIDE HYDROCHLORIDE 5 MG/1
5 CAPSULE, GELATIN COATED ORAL 3 TIMES DAILY
Status: DISCONTINUED | OUTPATIENT
Start: 2019-08-05 | End: 2019-08-02 | Stop reason: HOSPADM

## 2019-08-01 RX ORDER — CHLORDIAZEPOXIDE HYDROCHLORIDE 5 MG/1
5 CAPSULE, GELATIN COATED ORAL DAILY
Status: DISCONTINUED | OUTPATIENT
Start: 2019-08-08 | End: 2019-08-02 | Stop reason: HOSPADM

## 2019-08-01 RX ORDER — AMLODIPINE BESYLATE 10 MG/1
10 TABLET ORAL DAILY
Status: DISCONTINUED | OUTPATIENT
Start: 2019-08-01 | End: 2019-08-02 | Stop reason: HOSPADM

## 2019-08-01 RX ORDER — CEFUROXIME AXETIL 500 MG/1
500 TABLET ORAL EVERY 12 HOURS SCHEDULED
Status: DISCONTINUED | OUTPATIENT
Start: 2019-08-01 | End: 2019-08-02 | Stop reason: HOSPADM

## 2019-08-01 RX ORDER — IPRATROPIUM BROMIDE AND ALBUTEROL SULFATE 2.5; .5 MG/3ML; MG/3ML
1 SOLUTION RESPIRATORY (INHALATION) 3 TIMES DAILY
Status: DISCONTINUED | OUTPATIENT
Start: 2019-08-01 | End: 2019-08-02 | Stop reason: HOSPADM

## 2019-08-01 RX ORDER — ALBUTEROL SULFATE 2.5 MG/3ML
2.5 SOLUTION RESPIRATORY (INHALATION)
Status: DISCONTINUED | OUTPATIENT
Start: 2019-08-01 | End: 2019-08-02 | Stop reason: HOSPADM

## 2019-08-01 RX ORDER — CHLORDIAZEPOXIDE HYDROCHLORIDE 5 MG/1
20 CAPSULE, GELATIN COATED ORAL 3 TIMES DAILY
Status: DISCONTINUED | OUTPATIENT
Start: 2019-08-01 | End: 2019-08-02 | Stop reason: HOSPADM

## 2019-08-01 RX ORDER — CHLORDIAZEPOXIDE HYDROCHLORIDE 5 MG/1
10 CAPSULE, GELATIN COATED ORAL 3 TIMES DAILY
Status: DISCONTINUED | OUTPATIENT
Start: 2019-08-03 | End: 2019-08-02 | Stop reason: HOSPADM

## 2019-08-01 RX ADMIN — METOPROLOL TARTRATE 25 MG: 25 TABLET ORAL at 20:29

## 2019-08-01 RX ADMIN — CEFUROXIME AXETIL 500 MG: 500 TABLET ORAL at 12:58

## 2019-08-01 RX ADMIN — HYDRALAZINE HYDROCHLORIDE 20 MG: 20 INJECTION INTRAMUSCULAR; INTRAVENOUS at 00:00

## 2019-08-01 RX ADMIN — DEXMEDETOMIDINE 0.8 MCG/KG/HR: 100 INJECTION, SOLUTION, CONCENTRATE INTRAVENOUS at 01:54

## 2019-08-01 RX ADMIN — HYDROCHLOROTHIAZIDE 12.5 MG: 12.5 TABLET ORAL at 15:11

## 2019-08-01 RX ADMIN — Medication 10 ML: at 08:21

## 2019-08-01 RX ADMIN — IPRATROPIUM BROMIDE AND ALBUTEROL SULFATE 1 AMPULE: .5; 3 SOLUTION RESPIRATORY (INHALATION) at 05:05

## 2019-08-01 RX ADMIN — FAMOTIDINE 20 MG: 10 INJECTION, SOLUTION INTRAVENOUS at 08:20

## 2019-08-01 RX ADMIN — METOPROLOL TARTRATE 12.5 MG: 25 TABLET ORAL at 08:20

## 2019-08-01 RX ADMIN — CHLORDIAZEPOXIDE HYDROCHLORIDE 30 MG: 5 CAPSULE ORAL at 08:20

## 2019-08-01 RX ADMIN — CEFUROXIME AXETIL 500 MG: 500 TABLET ORAL at 23:26

## 2019-08-01 RX ADMIN — LABETALOL 20 MG/4 ML (5 MG/ML) INTRAVENOUS SYRINGE 10 MG: at 13:04

## 2019-08-01 RX ADMIN — Medication 10 ML: at 20:36

## 2019-08-01 RX ADMIN — ENOXAPARIN SODIUM 40 MG: 40 INJECTION SUBCUTANEOUS at 20:29

## 2019-08-01 RX ADMIN — CHLORDIAZEPOXIDE HYDROCHLORIDE 20 MG: 5 CAPSULE ORAL at 12:57

## 2019-08-01 RX ADMIN — IPRATROPIUM BROMIDE AND ALBUTEROL SULFATE 1 AMPULE: .5; 3 SOLUTION RESPIRATORY (INHALATION) at 14:43

## 2019-08-01 RX ADMIN — HYDRALAZINE HYDROCHLORIDE 20 MG: 20 INJECTION INTRAMUSCULAR; INTRAVENOUS at 14:32

## 2019-08-01 RX ADMIN — QUETIAPINE FUMARATE 100 MG: 100 TABLET ORAL at 20:29

## 2019-08-01 RX ADMIN — ACETAMINOPHEN 650 MG: 325 TABLET ORAL at 03:20

## 2019-08-01 RX ADMIN — IPRATROPIUM BROMIDE AND ALBUTEROL SULFATE 1 AMPULE: .5; 3 SOLUTION RESPIRATORY (INHALATION) at 19:10

## 2019-08-01 RX ADMIN — CHLORDIAZEPOXIDE HYDROCHLORIDE 20 MG: 5 CAPSULE ORAL at 20:29

## 2019-08-01 RX ADMIN — AMLODIPINE BESYLATE 10 MG: 10 TABLET ORAL at 15:11

## 2019-08-01 ASSESSMENT — PAIN SCALES - GENERAL
PAINLEVEL_OUTOF10: 0
PAINLEVEL_OUTOF10: 5

## 2019-08-01 ASSESSMENT — PAIN DESCRIPTION - LOCATION: LOCATION: HEAD

## 2019-08-01 ASSESSMENT — PAIN DESCRIPTION - PAIN TYPE: TYPE: ACUTE PAIN

## 2019-08-01 NOTE — PROGRESS NOTES
[facility] AT [facility] Respiratory Therapy Evaluation   Current Order:  duoneb q6      Home Regimen: none      Ordering Physician: dom  Re-evaluation Date:  8/4     Diagnosis: alcohol intox      Patient Status: Stable / Unstable + Physician notified    The following MDI Criteria must be met in order to convert aerosol to MDI with spacer. If unable to meet, MDI will be converted to aerosol:  []  Patient able to demonstrate the ability to use MDI effectively  []  Patient alert and cooperative  []  Patient able to take deep breath with 5-10 second hold  []  Medication(s) available in this delivery method   []  Peak flow greater than or equal to 200 ml/min            Current Order Substituted To  (same drug, same frequency)   Aerosol to MDI [] Albuterol Sulfate 0.083% unit dose by aerosol Albuterol Sulfate MDI 2 puffs by inhalation with spacer    [] Levalbuterol 1.25 mg unit dose by aerosol Levalbuterol MDI 2 puffs by inhalation with spacer    [] Levalbuterol 0.63 mg unit dose by aerosol Levalbuterol MDI 2 puffs by inhalation with spacer    [] Ipratropium Bromide 0.02% unit dose by aerosol Ipratropium Bromide MDI 2 puffs by inhalation with spacer    [] Duoneb (Ipratropium + Albuterol) unit dose by aerosol Ipratropium MDI + Albuterol MDI 2 puffs by inhalation w/spacer   MDI to Aerosol [] Albuterol Sulfate MDI Albuterol Sulfate 0.083% unit dose by aerosol    [] Levalbuterol MDI 2 puffs by inhalation Levalbuterol 1.25 mg unit dose by aerosol    [] Ipratropium Bromide MDI by inhalation Ipratropium Bromide 0.02% unit dose by aerosol    [] Combivent (Ipratropium + Albuterol) MDI by inhalation Duoneb (Ipratropium + Albuterol) unit dose by aerosol   Treatment Assessment [Frequency/Schedule]:  Change frequency to: __________________duoneb tid and albuterol q2prn________________________________per Protocol, P&T, MEC      Points 0 1 2 3 4   Pulmonary Status  Non-Smoker  []   Smoking history   < 20 pack years  []   Smoking history  ?

## 2019-08-01 NOTE — PROGRESS NOTES
Pulmonary & Critical Care Medicine ICU Progress Note  Chief complaint :  DT     Subjunctive/24 hour events :   Patient seen and examined during multidisciplinary rounds with RN, charge nurse, RT, pharmacy, dietitian, and social service. Patient is awake and alert, communicate comfortably, no confusion, denies pain, no shortness of breath, no abdominal pain, tolerating full liquid diet well, no nausea or vomiting, no fever, bowels moving, good urine output    Social History     Tobacco Use    Smoking status: Current Every Day Smoker     Packs/day: 2.00     Years: 25.00     Pack years: 50.00     Types: Cigarettes    Smokeless tobacco: Former User   Substance Use Topics    Alcohol use: Yes     Comment: 2-3 times a week          Problem Relation Age of Onset    Mental Illness Mother        Recent Labs     07/30/19  1208   PHART 7.416   [id number] 55*   PO2ART 76*       MV Settings:  Vent Mode: (EXTUBATED PT ) Rate Set: 20 bmp/Vt Ordered: 450 mL/ /FiO2 : 30 %           IV:      Vitals:  BP (!) 159/106   Pulse 97   Temp 98.2 °F (36.8 °C) (Oral)   Resp 15   Ht 5' 9\" (1.753 m) Comment: per EMR  Wt 174 lb 9.7 oz (79.2 kg)   SpO2 97%   BMI 25.78 kg/m²    Tmax:        Intake/Output Summary (Last 24 hours) at 8/1/2019 1058  Last data filed at 8/1/2019 0924  Gross per 24 hour   Intake 1951.14 ml   Output 2500 ml   Net -548.86 ml       EXAM:  General: No distress, awake and alert sitting in chair  Head: normocephalic, atraumatic  Eyes:No gross abnormalities. and PERRL  ENT:  MMM no lesions,    Neck:  supple and no masses  Chest : Good air movement, no wheezing, bibasilar rales, nontender, tympanic  Heart[de-identified] Heart sounds are normal.  Regular rate and rhythm without murmur, gallop or rub. ABD:  symmetric, soft, non-tender  Musculoskeletal : no cyanosis, no clubbing and no edema  Neuro: Grossly normal  Skin: No rashes or nodules noted.   Lymph node:  no cervical nodes  Urology: Yes Serrano   Psychiatric: Calm and

## 2019-08-01 NOTE — PROGRESS NOTES
Intravenous Q4H PRN Charisse Schwarz MD   20 mg at 08/01/19 0000    acetaminophen (TYLENOL) tablet 650 mg  650 mg Oral Q4H PRN Adalberto Evans MD   650 mg at 08/01/19 0320    LORazepam (ATIVAN) tablet 1 mg  1 mg Oral Q1H PRN Adalberto Evans MD        Or    LORazepam (ATIVAN) injection 1 mg  1 mg Intravenous Q1H PRN Adalberto Evans MD   1 mg at 07/28/19 0043    Or    LORazepam (ATIVAN) tablet 2 mg  2 mg Oral Q1H PRN Adalberto Evans MD        Or    LORazepam (ATIVAN) injection 2 mg  2 mg Intravenous Q1H PRN Adalberto Evans MD   2 mg at 07/31/19 0801    Or    LORazepam (ATIVAN) tablet 3 mg  3 mg Oral Q1H PRN Adalberto Evans MD        Or    LORazepam (ATIVAN) injection 3 mg  3 mg Intravenous Q1H PRN Adalberto Evans MD   3 mg at 07/27/19 2158    Or    LORazepam (ATIVAN) tablet 4 mg  4 mg Oral Q1H PRN Adalberto Evans MD        Or    LORazepam (ATIVAN) injection 4 mg  4 mg Intravenous Q1H PRN Adalberto Evans MD   4 mg at 07/31/19 0437    ondansetron (ZOFRAN) injection 4 mg  4 mg Intravenous Q6H PRN FELIPA Yoder CNP        enoxaparin (LOVENOX) injection 40 mg  40 mg Subcutaneous Daily FELIPA Yoder CNP   40 mg at 07/31/19 2138    acetaminophen (TYLENOL) suppository 650 mg  650 mg Rectal Q4H PRN FELIPA Yoder CNP        potassium chloride (KLOR-CON M) extended release tablet 40 mEq  40 mEq Oral PRN Amber Tavares Sedar, DO        Or    potassium chloride (KLOR-CON) packet 40 mEq  40 mEq Oral PRN Amber Tavares Sedar, DO        Or    potassium chloride 10 mEq/100 mL IVPB (Peripheral Line)  10 mEq Intravenous PRN Amber Tavares Sedar, DO        famotidine (PEPCID) injection 20 mg  20 mg Intravenous BID Dayton Rojo MD   20 mg at 08/01/19 0820    chlorhexidine (PERIDEX) 0.12 % solution 15 mL  15 mL Mouth/Throat BID Dayton Rojo MD   15 mL at 07/31/19 0880         OBJECTIVE:  Vital Signs:  Vitals:    08/01/19 1200   BP: (!) 177/115   Pulse: 120   Resp:    Temp: 98.4 °F (36.9 °C)   SpO2: Focal exam:      General Exam (except as mentioned above):  CONSTITUTIONAL: Awake, alert, no apparent distress  EYES:  PERRL, conjunctiva normal  ENT:  Normocephalic, atraumatic  NECK:  Supple  BACK:  Symmetric  LUNGS:  CTAB except bilateral basilar crackles. CARDIOVASCULAR:  S1S2 present  ABDOMEN:  soft, non-distended, non-tender  MUSCULOSKELETAL:  There is no redness, warmth, or swelling of the joints. NEUROLOGIC:  Alert, awake, oriented x 3. No FND  EXTREMITIES: Warm and well perfused. LABS  Recent Labs     07/30/19  0530 07/30/19  1208 07/31/19  0519 08/01/19  0459   WBC 6.8  --  8.9 9.5   RBC 3.59*  --  3.87* 4.13*   HGB 12.4* 14.1 13.6* 14.2   HCT 35.6*  --  38.3* 40.4*   MCV 99.2  --  99.1 97.8   MCH 34.6*  --  35.1* 34.5*   MCHC 34.9  --  35.4 35.3   RDW 13.4  --  12.7 13.2     --  212 239       Recent Labs     07/30/19  0529 07/30/19  1208 07/31/19  0524 08/01/19  0459     --  140 142   K 4.1  --  3.8 3.6     --  100 101   CO2 29  --  27 28   BUN 9  --  8 10   CREATININE 0.49* 0.7* 0.47* 0.56*   GLUCOSE 85  --  100* 100*   CALCIUM 8.4*  --  9.2 9.1       No results for input(s): MG in the last 72 hours. ASSESSMENT AND PLAN    Active Hospital Problems    Diagnosis Date Noted    Acute alcoholic intoxication with complication ([facility] [S13.882] 07/25/2019      Acute hypoxic respiratory failure: Secondary to strep Pneumoniae. Extubated.  Doing well     - Streptococcal Pneumonia: Started on ceftin for next 5 days     - Alcohol withdrawal: Continue Ativan and Librium     - Acute metabolic encephalopathy: Secondary to sepsis and alcohol intoxication and withdrawal. resolved    Transfer to [facility]    DVT prophylaxis: Lovenox  David Camargo MD  Pager : [phone]

## 2019-08-02 VITALS
HEIGHT: 69 IN | BODY MASS INDEX: 22.22 KG/M2 | HEART RATE: 88 BPM | RESPIRATION RATE: 18 BRPM | WEIGHT: 150 LBS | SYSTOLIC BLOOD PRESSURE: 156 MMHG | DIASTOLIC BLOOD PRESSURE: 108 MMHG | OXYGEN SATURATION: 98 % | TEMPERATURE: 98.2 F

## 2019-08-02 LAB
BLOOD CULTURE, ROUTINE: NORMAL
CULTURE, BLOOD 2: NORMAL

## 2019-08-02 PROCEDURE — 94760 N-INVAS EAR/PLS OXIMETRY 1: CPT

## 2019-08-02 PROCEDURE — 6370000000 HC RX 637 (ALT 250 FOR IP): Performed by: INTERNAL MEDICINE

## 2019-08-02 PROCEDURE — 99233 SBSQ HOSP IP/OBS HIGH 50: CPT | Performed by: PSYCHIATRY & NEUROLOGY

## 2019-08-02 PROCEDURE — 97535 SELF CARE MNGMENT TRAINING: CPT

## 2019-08-02 PROCEDURE — 94640 AIRWAY INHALATION TREATMENT: CPT

## 2019-08-02 PROCEDURE — 2500000003 HC RX 250 WO HCPCS: Performed by: INTERNAL MEDICINE

## 2019-08-02 PROCEDURE — 2580000003 HC RX 258: Performed by: INTERNAL MEDICINE

## 2019-08-02 RX ORDER — AMLODIPINE BESYLATE 10 MG/1
10 TABLET ORAL DAILY
Qty: 30 TABLET | Refills: 3 | Status: ON HOLD | OUTPATIENT
Start: 2019-08-03 | End: 2019-09-30 | Stop reason: HOSPADM

## 2019-08-02 RX ORDER — HYDROCHLOROTHIAZIDE 12.5 MG/1
12.5 TABLET ORAL DAILY
Qty: 30 TABLET | Refills: 3 | Status: ON HOLD | OUTPATIENT
Start: 2019-08-03 | End: 2019-09-30 | Stop reason: HOSPADM

## 2019-08-02 RX ORDER — CEFUROXIME AXETIL 500 MG/1
500 TABLET ORAL EVERY 12 HOURS SCHEDULED
Qty: 10 TABLET | Refills: 0 | Status: SHIPPED | OUTPATIENT
Start: 2019-08-02 | End: 2019-08-07

## 2019-08-02 RX ADMIN — HYDROCHLOROTHIAZIDE 12.5 MG: 12.5 TABLET ORAL at 09:53

## 2019-08-02 RX ADMIN — AMLODIPINE BESYLATE 10 MG: 10 TABLET ORAL at 09:53

## 2019-08-02 RX ADMIN — CHLORHEXIDINE GLUCONATE 0.12% ORAL RINSE 15 ML: 1.2 LIQUID ORAL at 09:54

## 2019-08-02 RX ADMIN — CHLORDIAZEPOXIDE HYDROCHLORIDE 20 MG: 5 CAPSULE ORAL at 09:53

## 2019-08-02 RX ADMIN — METOPROLOL TARTRATE 25 MG: 25 TABLET ORAL at 09:53

## 2019-08-02 RX ADMIN — CEFUROXIME AXETIL 500 MG: 500 TABLET ORAL at 09:53

## 2019-08-02 RX ADMIN — Medication 10 ML: at 09:54

## 2019-08-02 RX ADMIN — IPRATROPIUM BROMIDE AND ALBUTEROL SULFATE 1 AMPULE: .5; 3 SOLUTION RESPIRATORY (INHALATION) at 14:34

## 2019-08-02 RX ADMIN — IPRATROPIUM BROMIDE AND ALBUTEROL SULFATE 1 AMPULE: .5; 3 SOLUTION RESPIRATORY (INHALATION) at 09:07

## 2019-08-02 RX ADMIN — FAMOTIDINE 20 MG: 10 INJECTION, SOLUTION INTRAVENOUS at 09:53
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
## 2019-08-02 NOTE — PROGRESS NOTES
Worsening  [x] No change      Diagnosis:   Alcohol use disorder  SIMD vs MDD  Active Problems:    Acute alcoholic intoxication with complication ([address]  Resolved Problems:    * No resolved hospital problems. *      LABS:    Recent Labs     07/30/19  1208 07/31/19  0519 08/01/19 0459   WBC  --  8.9 9.5   HGB 14.1 13.6* 14.2   PLT  --  212 239     Recent Labs     07/30/19  1208 07/31/19  0524 08/01/19  0459   NA  --  140 142   K  --  3.8 3.6   CL  --  100 101   CO2  --  27 28   BUN  --  8 10   CREATININE 0.7* 0.47* 0.56*   GLUCOSE  --  100* 100*     No results for input(s): BILITOT, ALKPHOS, AST, ALT in the last 72 hours. Lab Results   Component Value Date    LABAMPH Neg 07/25/2019    BARBSCNU Neg 07/25/2019    LABBENZ Neg 07/25/2019    OPIATESCREENURINE Neg 07/25/2019    PHENCYCLIDINESCREENURINE Neg 07/25/2019    ETOH 411 07/25/2019     Lab Results   Component Value Date    TSH 1.160 07/25/2019     No results found for: LITHIUM  No results found for: VALPROATE, CBMZ    RISK ASSESSMENT:     Treatment Plan:    1. Continue 1:1 sitter  2. Pt cannot sign AMA  3. Will continue to make assessment to determine whether he meets criteria for probation  4. Pt current presentation seem to be sec to his alcohol use.  Informed family that I cannot probate someone for alcohol use    Will follow up with the patient tomorrow      Electronically signed by Delia Almendarez MD

## 2019-08-02 NOTE — PROGRESS NOTES
BEHAVIORAL HEALTH FOLLOW-UP NOTE     8/2/2019     Patient was seen and examined in person, Chart reviewed   Patient's case discussed with staff/team    Chief Complaint: Alcoholism    Interim History:     Pt continue to do well mentally  He is minimizing the severity of alcohol use  Worried about work and not want to loose it  Denies depression or suicidal  I love my life and do not want to end it  Aware that drinking makes him feel depressed  Met with [facility] and he is planning to attend [address] [place] [address] Ave program  Pt is planing to go and stay with his GF  No AH or VH  No Paranoid thoughts  Pt probate hearing is scheduled for monday  Appetite:   [x] Normal/Unchanged  [] Increased  [] Decreased      Sleep:       [x] Normal/Unchanged  [] Fair       [] Poor              Energy:    [x] Normal/Unchanged  [] Increased  [] Decreased        SI [] Present  [x] Absent    HI  []Present  [x] Absent     Aggression:  [] yes  [x] no    Patient is [] able  [] unable to CONTRACT FOR SAFETY     PAST MEDICAL/PSYCHIATRIC HISTORY:   Past Medical History:   Diagnosis Date    Hypertension        FAMILY/SOCIAL HISTORY:  Family History   Problem Relation Age of Onset    Mental Illness Mother      Social History     Socioeconomic History    Marital status:      Spouse name: Not on file    Number of children: Not on file    Years of education: Not on file    Highest education level: Not on file   Occupational History    Not on file   Social Needs    Financial resource strain: Not on file    Food insecurity:     Worry: Not on file     Inability: Not on file    Transportation needs:     Medical: Not on file     Non-medical: Not on file   Tobacco Use    Smoking status: Current Every Day Smoker     Packs/day: 2.00     Years: 25.00     Pack years: 50.00     Types: Cigarettes    Smokeless tobacco: Former User   Substance and Sexual Activity    Alcohol use: Yes     Comment: 2-3 times a week     Drug use: No    Sexual activity: Not on file

## 2019-08-02 NOTE — FLOWSHEET NOTE
Pt remains 1:1 for safety. Pt was seen by psych this a.m and RN advised to contact [facility] to speak to pt re outpt rehab, from there will decide on plan of care re; cancelling hearing for pink slip.  [facility] speaking to pt right now, psych messaged, care team following, cont to monitor, VSS no pt complaints Electronically signed by Jamee Pillai, RN on 8/2/2019 at 1:14 PM

## 2019-09-22 ENCOUNTER — APPOINTMENT (OUTPATIENT)
Dept: GENERAL RADIOLOGY | Age: 44
End: 2019-09-22

## 2019-09-22 ENCOUNTER — APPOINTMENT (OUTPATIENT)
Dept: CT IMAGING | Age: 44
End: 2019-09-22

## 2019-09-22 ENCOUNTER — HOSPITAL ENCOUNTER (EMERGENCY)
Age: 44
Discharge: HOME | End: 2019-09-22

## 2019-09-22 VITALS
WEIGHT: 150 LBS | DIASTOLIC BLOOD PRESSURE: 95 MMHG | TEMPERATURE: 98.2 F | SYSTOLIC BLOOD PRESSURE: 138 MMHG | RESPIRATION RATE: 16 BRPM | OXYGEN SATURATION: 100 % | HEIGHT: 68 IN | BODY MASS INDEX: 22.73 KG/M2 | HEART RATE: 103 BPM

## 2019-09-22 DIAGNOSIS — F10.920 ACUTE ALCOHOLIC INTOXICATION WITHOUT COMPLICATION (HCC): ICD-10-CM
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
## 2019-09-22 DIAGNOSIS — S09.90XA INJURY OF HEAD, INITIAL ENCOUNTER: ICD-10-CM

## 2019-09-22 DIAGNOSIS — M25.512 ACUTE PAIN OF LEFT SHOULDER: ICD-10-CM

## 2019-09-22 LAB
ABO/RH: NORMAL
ALBUMIN SERPL-MCNC: 4.7 G/DL (ref 3.5–4.6)
ALP BLD-CCNC: 78 U/L (ref 35–104)
ALT SERPL-CCNC: 73 U/L (ref 0–41)
ANION GAP SERPL CALCULATED.3IONS-SCNC: 17 MEQ/L (ref 9–15)
ANTIBODY SCREEN: NORMAL
APTT: 23.2 SEC (ref 24.4–36.8)
AST SERPL-CCNC: 97 U/L (ref 0–40)
BASOPHILS ABSOLUTE: 0.1 K/UL (ref 0–0.2)
BASOPHILS RELATIVE PERCENT: 1.1 %
BILIRUB SERPL-MCNC: 0.6 MG/DL (ref 0.2–0.7)
BUN BLDV-MCNC: 12 MG/DL (ref 6–20)
CALCIUM SERPL-MCNC: 8.9 MG/DL (ref 8.5–9.9)
CHLORIDE BLD-SCNC: 94 MEQ/L (ref 95–107)
CO2: 23 MEQ/L (ref 20–31)
CREAT SERPL-MCNC: 0.79 MG/DL (ref 0.7–1.2)
EOSINOPHILS ABSOLUTE: 0.1 K/UL (ref 0–0.7)
EOSINOPHILS RELATIVE PERCENT: 1.9 %
ETHANOL PERCENT: 0.29 G/DL
ETHANOL: 334 MG/DL (ref 0–0.08)
GFR AFRICAN AMERICAN: >60
GFR NON-AFRICAN AMERICAN: >60
GLOBULIN: 3.1 G/DL (ref 2.3–3.5)
GLUCOSE BLD-MCNC: 101 MG/DL (ref 70–99)
HCT VFR BLD CALC: 48.7 % (ref 42–52)
HEMOGLOBIN: 16.4 G/DL (ref 14–18)
INR BLD: 0.9
LYMPHOCYTES ABSOLUTE: 2.9 K/UL (ref 1–4.8)
LYMPHOCYTES RELATIVE PERCENT: 44 %
MCH RBC QN AUTO: 32.5 PG (ref 27–31.3)
MCHC RBC AUTO-ENTMCNC: 33.7 % (ref 33–37)
MCV RBC AUTO: 96.4 FL (ref 80–100)
MONOCYTES ABSOLUTE: 0.6 K/UL (ref 0.2–0.8)
MONOCYTES RELATIVE PERCENT: 8.6 %
NEUTROPHILS ABSOLUTE: 2.9 K/UL (ref 1.4–6.5)
NEUTROPHILS RELATIVE PERCENT: 44.4 %
PDW BLD-RTO: 12.4 % (ref 11.5–14.5)
PLATELET # BLD: 219 K/UL (ref 130–400)
POTASSIUM SERPL-SCNC: 3.5 MEQ/L (ref 3.4–4.9)
PROTHROMBIN TIME: 12.7 SEC (ref 12.3–14.9)
RBC # BLD: 5.05 M/UL (ref 4.7–6.1)
SODIUM BLD-SCNC: 134 MEQ/L (ref 135–144)
TOTAL PROTEIN: 7.8 G/DL (ref 6.3–8)
WBC # BLD: 6.6 K/UL (ref 4.8–10.8)

## 2019-09-22 PROCEDURE — 72125 CT NECK SPINE W/O DYE: CPT

## 2019-09-22 PROCEDURE — 85730 THROMBOPLASTIN TIME PARTIAL: CPT

## 2019-09-22 PROCEDURE — 99285 EMERGENCY DEPT VISIT HI MDM: CPT | Performed by: SURGERY

## 2019-09-22 PROCEDURE — 86900 BLOOD TYPING SEROLOGIC ABO: CPT

## 2019-09-22 PROCEDURE — G0480 DRUG TEST DEF 1-7 CLASSES: HCPCS

## 2019-09-22 PROCEDURE — 6830039000 HC L3 TRAUMA ALERT

## 2019-09-22 PROCEDURE — 80053 COMPREHEN METABOLIC PANEL: CPT

## 2019-09-22 PROCEDURE — 99285 EMERGENCY DEPT VISIT HI MDM: CPT

## 2019-09-22 PROCEDURE — 6360000004 HC RX CONTRAST MEDICATION: Performed by: PHYSICIAN ASSISTANT

## 2019-09-22 PROCEDURE — 72128 CT CHEST SPINE W/O DYE: CPT

## 2019-09-22 PROCEDURE — 86850 RBC ANTIBODY SCREEN: CPT

## 2019-09-22 PROCEDURE — 74177 CT ABD & PELVIS W/CONTRAST: CPT

## 2019-09-22 PROCEDURE — 73030 X-RAY EXAM OF SHOULDER: CPT

## 2019-09-22 PROCEDURE — 85610 PROTHROMBIN TIME: CPT

## 2019-09-22 PROCEDURE — 72170 X-RAY EXAM OF PELVIS: CPT

## 2019-09-22 PROCEDURE — 86901 BLOOD TYPING SEROLOGIC RH(D): CPT

## 2019-09-22 PROCEDURE — 70450 CT HEAD/BRAIN W/O DYE: CPT

## 2019-09-22 PROCEDURE — 85025 COMPLETE CBC W/AUTO DIFF WBC: CPT

## 2019-09-22 PROCEDURE — 71260 CT THORAX DX C+: CPT

## 2019-09-22 PROCEDURE — 72131 CT LUMBAR SPINE W/O DYE: CPT

## 2019-09-22 PROCEDURE — 71045 X-RAY EXAM CHEST 1 VIEW: CPT

## 2019-09-22 PROCEDURE — 36415 COLL VENOUS BLD VENIPUNCTURE: CPT

## 2019-09-22 RX ORDER — IBUPROFEN 600 MG/1
600 TABLET ORAL EVERY 6 HOURS PRN
Qty: 20 TABLET | Refills: 0 | Status: ON HOLD | OUTPATIENT
Start: 2019-09-22 | End: 2019-10-01 | Stop reason: HOSPADM

## 2019-09-22 RX ADMIN — IOPAMIDOL 100 ML: 612 INJECTION, SOLUTION INTRAVENOUS at 09:05

## 2019-09-22 ASSESSMENT — PAIN DESCRIPTION - LOCATION
LOCATION: SHOULDER;HIP
LOCATION: SHOULDER

## 2019-09-22 ASSESSMENT — PAIN DESCRIPTION - ORIENTATION
ORIENTATION: LEFT
ORIENTATION: LEFT

## 2019-09-22 ASSESSMENT — PAIN DESCRIPTION - FREQUENCY: FREQUENCY: CONTINUOUS

## 2019-09-22 ASSESSMENT — PAIN DESCRIPTION - PAIN TYPE: TYPE: CHRONIC PAIN

## 2019-09-22 ASSESSMENT — PAIN SCALES - GENERAL
PAINLEVEL_OUTOF10: 5
PAINLEVEL_OUTOF10: 8

## 2019-09-22 NOTE — ED NOTES
Pt given new shirt to wear home and discharged into the care of his landlord, Emilie Fournier.      Joby Salmon RN  09/22/19 [facility] Josué Dugan RN  09/22/19 1127

## 2019-09-22 NOTE — ED TRIAGE NOTES
Pt to ER via [address] after reportedly falling asleep next to a window and rolling out of the window, falling an estimated 16ft, possibly landing on a decorative rock. Pt arrived in full immobilization, back board and c-collar. Pt alert, speaking, complains of \"just the usual old man aches, nothing new. \" Multiple skin abrasions as noted.

## 2019-09-23 LAB
GFR AFRICAN AMERICAN: >60
GFR NON-AFRICAN AMERICAN: >60
PERFORMED ON: NORMAL
POC CREATININE: 1.1 MG/DL (ref 0.9–1.3)
POC SAMPLE TYPE: NORMAL

## 2019-09-25 ENCOUNTER — HOSPITAL ENCOUNTER (INPATIENT)
Age: 44
LOS: 6 days | Discharge: HOME | DRG: 885 | End: 2019-10-01
Attending: PSYCHIATRY & NEUROLOGY | Admitting: PSYCHIATRY & NEUROLOGY

## 2019-09-25 PROBLEM — F33.9 MDD (MAJOR DEPRESSIVE DISORDER), RECURRENT EPISODE (HCC): Status: ACTIVE | Noted: 2019-09-25

## 2019-09-25 PROCEDURE — 1240000000 HC EMOTIONAL WELLNESS R&B

## 2019-09-25 PROCEDURE — 6370000000 HC RX 637 (ALT 250 FOR IP): Performed by: PSYCHIATRY & NEUROLOGY

## 2019-09-25 PROCEDURE — 6370000000 HC RX 637 (ALT 250 FOR IP): Performed by: INTERNAL MEDICINE

## 2019-09-25 RX ORDER — HYDROXYZINE PAMOATE 50 MG/1
50 CAPSULE ORAL EVERY 6 HOURS PRN
Status: DISCONTINUED | OUTPATIENT
Start: 2019-09-25 | End: 2019-10-01 | Stop reason: HOSPADM

## 2019-09-25 RX ORDER — CHLORDIAZEPOXIDE HYDROCHLORIDE 25 MG/1
75 CAPSULE, GELATIN COATED ORAL
Status: DISCONTINUED | OUTPATIENT
Start: 2019-09-25 | End: 2019-09-30

## 2019-09-25 RX ORDER — ACETAMINOPHEN 325 MG/1
650 TABLET ORAL EVERY 4 HOURS PRN
Status: DISCONTINUED | OUTPATIENT
Start: 2019-09-25 | End: 2019-10-01 | Stop reason: HOSPADM

## 2019-09-25 RX ORDER — CHLORDIAZEPOXIDE HYDROCHLORIDE 25 MG/1
25 CAPSULE, GELATIN COATED ORAL EVERY 4 HOURS PRN
Status: DISCONTINUED | OUTPATIENT
Start: 2019-09-25 | End: 2019-09-30

## 2019-09-25 RX ORDER — DIVALPROEX SODIUM 125 MG/1
250 CAPSULE, COATED PELLETS ORAL EVERY 12 HOURS SCHEDULED
Status: DISCONTINUED | OUTPATIENT
Start: 2019-09-25 | End: 2019-09-25

## 2019-09-25 RX ORDER — HYDROXYZINE HYDROCHLORIDE 50 MG/ML
50 INJECTION, SOLUTION INTRAMUSCULAR EVERY 6 HOURS PRN
Status: DISCONTINUED | OUTPATIENT
Start: 2019-09-25 | End: 2019-10-01 | Stop reason: HOSPADM

## 2019-09-25 RX ORDER — DIVALPROEX SODIUM 250 MG/1
250 TABLET, DELAYED RELEASE ORAL EVERY 12 HOURS SCHEDULED
Status: DISCONTINUED | OUTPATIENT
Start: 2019-09-25 | End: 2019-09-29

## 2019-09-25 RX ORDER — FOLIC ACID 1 MG/1
1 TABLET ORAL DAILY
Status: DISCONTINUED | OUTPATIENT
Start: 2019-09-25 | End: 2019-10-01 | Stop reason: HOSPADM

## 2019-09-25 RX ORDER — MAGNESIUM HYDROXIDE/ALUMINUM HYDROXICE/SIMETHICONE 120; 1200; 1200 MG/30ML; MG/30ML; MG/30ML
30 SUSPENSION ORAL PRN
Status: DISCONTINUED | OUTPATIENT
Start: 2019-09-25 | End: 2019-10-01 | Stop reason: HOSPADM

## 2019-09-25 RX ORDER — MULTIVITAMIN WITH FOLIC ACID 400 MCG
1 TABLET ORAL DAILY
Status: DISCONTINUED | OUTPATIENT
Start: 2019-09-25 | End: 2019-10-01 | Stop reason: HOSPADM

## 2019-09-25 RX ORDER — AMLODIPINE BESYLATE 10 MG/1
10 TABLET ORAL DAILY
Status: DISCONTINUED | OUTPATIENT
Start: 2019-09-25 | End: 2019-09-27

## 2019-09-25 RX ORDER — THIAMINE MONONITRATE (VIT B1) 100 MG
100 TABLET ORAL DAILY
Status: DISCONTINUED | OUTPATIENT
Start: 2019-09-25 | End: 2019-10-01 | Stop reason: HOSPADM

## 2019-09-25 RX ORDER — HALOPERIDOL 5 MG
5 TABLET ORAL EVERY 6 HOURS PRN
Status: DISCONTINUED | OUTPATIENT
Start: 2019-09-25 | End: 2019-10-01 | Stop reason: HOSPADM

## 2019-09-25 RX ORDER — CHLORDIAZEPOXIDE HYDROCHLORIDE 10 MG/1
10 CAPSULE, GELATIN COATED ORAL EVERY 4 HOURS PRN
Status: DISCONTINUED | OUTPATIENT
Start: 2019-09-25 | End: 2019-09-30

## 2019-09-25 RX ORDER — CHLORDIAZEPOXIDE HYDROCHLORIDE 25 MG/1
50 CAPSULE, GELATIN COATED ORAL
Status: DISCONTINUED | OUTPATIENT
Start: 2019-09-25 | End: 2019-09-30

## 2019-09-25 RX ORDER — HALOPERIDOL 5 MG/ML
5 INJECTION INTRAMUSCULAR EVERY 6 HOURS PRN
Status: DISCONTINUED | OUTPATIENT
Start: 2019-09-25 | End: 2019-10-01 | Stop reason: HOSPADM

## 2019-09-25 RX ORDER — TRAZODONE HYDROCHLORIDE 50 MG/1
50 TABLET ORAL NIGHTLY PRN
Status: DISCONTINUED | OUTPATIENT
Start: 2019-09-25 | End: 2019-10-01 | Stop reason: HOSPADM

## 2019-09-25 RX ORDER — BENZTROPINE MESYLATE 1 MG/ML
2 INJECTION INTRAMUSCULAR; INTRAVENOUS 2 TIMES DAILY PRN
Status: DISCONTINUED | OUTPATIENT
Start: 2019-09-25 | End: 2019-10-01 | Stop reason: HOSPADM

## 2019-09-25 RX ORDER — LORAZEPAM 2 MG/ML
4 INJECTION INTRAMUSCULAR EVERY 6 HOURS PRN
Status: DISCONTINUED | OUTPATIENT
Start: 2019-09-25 | End: 2019-09-30

## 2019-09-25 RX ORDER — LORAZEPAM 1 MG/1
4 TABLET ORAL
Status: DISCONTINUED | OUTPATIENT
Start: 2019-09-25 | End: 2019-09-30

## 2019-09-25 RX ADMIN — THERA TABS 1 TABLET: TAB at 21:51

## 2019-09-25 RX ADMIN — ACETAMINOPHEN 650 MG: 325 TABLET ORAL at 21:54

## 2019-09-25 RX ADMIN — Medication 100 MG: at 21:51

## 2019-09-25 RX ADMIN — TRAZODONE HYDROCHLORIDE 50 MG: 50 TABLET ORAL at 21:52

## 2019-09-25 RX ADMIN — DIVALPROEX SODIUM 250 MG: 250 TABLET, DELAYED RELEASE ORAL at 21:51

## 2019-09-25 RX ADMIN — CHLORDIAZEPOXIDE HYDROCHLORIDE 10 MG: 10 CAPSULE ORAL at 21:52

## 2019-09-25 RX ADMIN — FOLIC ACID 1 MG: 1 TABLET ORAL at 21:51

## 2019-09-25 RX ADMIN — AMLODIPINE BESYLATE 10 MG: 10 TABLET ORAL at 22:05

## 2019-09-25 ASSESSMENT — SLEEP AND FATIGUE QUESTIONNAIRES
RESTFUL SLEEP: NO
DO YOU USE A SLEEP AID: YES
DO YOU HAVE DIFFICULTY SLEEPING: YES
SLEEP PATTERN: DIFFICULTY FALLING ASLEEP;DISTURBED/INTERRUPTED SLEEP
DIFFICULTY FALLING ASLEEP: YES
AVERAGE NUMBER OF SLEEP HOURS: 3
DIFFICULTY STAYING ASLEEP: YES
DIFFICULTY ARISING: NO

## 2019-09-25 ASSESSMENT — PAIN SCALES - GENERAL: PAINLEVEL_OUTOF10: 5

## 2019-09-25 ASSESSMENT — PATIENT HEALTH QUESTIONNAIRE - PHQ9: SUM OF ALL RESPONSES TO PHQ QUESTIONS 1-9: 10

## 2019-09-25 ASSESSMENT — LIFESTYLE VARIABLES: HISTORY_ALCOHOL_USE: YES

## 2019-09-26 LAB
ALBUMIN SERPL-MCNC: 4.2 G/DL (ref 3.5–4.6)
ALP BLD-CCNC: 82 U/L (ref 35–104)
ALT SERPL-CCNC: 32 U/L (ref 0–41)
ANION GAP SERPL CALCULATED.3IONS-SCNC: 11 MEQ/L (ref 9–15)
AST SERPL-CCNC: 32 U/L (ref 0–40)
BILIRUB SERPL-MCNC: 0.5 MG/DL (ref 0.2–0.7)
BUN BLDV-MCNC: 7 MG/DL (ref 6–20)
CALCIUM SERPL-MCNC: 9.5 MG/DL (ref 8.5–9.9)
CHLORIDE BLD-SCNC: 97 MEQ/L (ref 95–107)
CHOLESTEROL, TOTAL: 186 MG/DL (ref 0–199)
CO2: 29 MEQ/L (ref 20–31)
CREAT SERPL-MCNC: 0.67 MG/DL (ref 0.7–1.2)
GFR AFRICAN AMERICAN: >60
GFR NON-AFRICAN AMERICAN: >60
GLOBULIN: 3.2 G/DL (ref 2.3–3.5)
GLUCOSE BLD-MCNC: 102 MG/DL (ref 70–99)
HDLC SERPL-MCNC: 113 MG/DL (ref 40–59)
LDL CHOLESTEROL CALCULATED: 46 MG/DL (ref 0–129)
POTASSIUM REFLEX MAGNESIUM: 4 MEQ/L (ref 3.4–4.9)
SODIUM BLD-SCNC: 137 MEQ/L (ref 135–144)
TOTAL PROTEIN: 7.4 G/DL (ref 6.3–8)
TRIGL SERPL-MCNC: 134 MG/DL (ref 0–150)

## 2019-09-26 PROCEDURE — 1240000000 HC EMOTIONAL WELLNESS R&B

## 2019-09-26 PROCEDURE — 6370000000 HC RX 637 (ALT 250 FOR IP): Performed by: PSYCHIATRY & NEUROLOGY

## 2019-09-26 PROCEDURE — 6370000000 HC RX 637 (ALT 250 FOR IP): Performed by: INTERNAL MEDICINE

## 2019-09-26 PROCEDURE — 80061 LIPID PANEL: CPT

## 2019-09-26 PROCEDURE — 36415 COLL VENOUS BLD VENIPUNCTURE: CPT

## 2019-09-26 PROCEDURE — 80053 COMPREHEN METABOLIC PANEL: CPT

## 2019-09-26 PROCEDURE — 99223 1ST HOSP IP/OBS HIGH 75: CPT | Performed by: PSYCHIATRY & NEUROLOGY

## 2019-09-26 RX ORDER — SERTRALINE HYDROCHLORIDE 25 MG/1
25 TABLET, FILM COATED ORAL DAILY
Status: DISCONTINUED | OUTPATIENT
Start: 2019-09-26 | End: 2019-09-27

## 2019-09-26 RX ORDER — NICOTINE 21 MG/24HR
1 PATCH, TRANSDERMAL 24 HOURS TRANSDERMAL DAILY
Status: DISCONTINUED | OUTPATIENT
Start: 2019-09-26 | End: 2019-09-30

## 2019-09-26 RX ORDER — ANALGESIC BALM 1.74; 4.06 G/29G; G/29G
OINTMENT TOPICAL 3 TIMES DAILY PRN
Status: DISCONTINUED | OUTPATIENT
Start: 2019-09-26 | End: 2019-10-01 | Stop reason: HOSPADM

## 2019-09-26 RX ORDER — ANALGESIC BALM 1.74; 4.06 G/29G; G/29G
OINTMENT TOPICAL 3 TIMES DAILY PRN
Status: DISCONTINUED | OUTPATIENT
Start: 2019-09-26 | End: 2019-09-26

## 2019-09-26 RX ADMIN — THERA TABS 1 TABLET: TAB at 08:38

## 2019-09-26 RX ADMIN — TRAZODONE HYDROCHLORIDE 50 MG: 50 TABLET ORAL at 21:47

## 2019-09-26 RX ADMIN — CHLORDIAZEPOXIDE HYDROCHLORIDE 10 MG: 10 CAPSULE ORAL at 21:47

## 2019-09-26 RX ADMIN — AMLODIPINE BESYLATE 10 MG: 10 TABLET ORAL at 08:38

## 2019-09-26 RX ADMIN — ACETAMINOPHEN 650 MG: 325 TABLET ORAL at 06:26

## 2019-09-26 RX ADMIN — FOLIC ACID 1 MG: 1 TABLET ORAL at 08:38

## 2019-09-26 RX ADMIN — SERTRALINE HYDROCHLORIDE 25 MG: 25 TABLET ORAL at 10:11

## 2019-09-26 RX ADMIN — MENTHOL AND METHYL SALICYLATE: 7.6; 29 OINTMENT TOPICAL at 14:28

## 2019-09-26 RX ADMIN — DIVALPROEX SODIUM 250 MG: 250 TABLET, DELAYED RELEASE ORAL at 21:46

## 2019-09-26 RX ADMIN — Medication 100 MG: at 08:38

## 2019-09-26 RX ADMIN — DIVALPROEX SODIUM 250 MG: 250 TABLET, DELAYED RELEASE ORAL at 08:39

## 2019-09-26 ASSESSMENT — PAIN SCALES - GENERAL: PAINLEVEL_OUTOF10: 6

## 2019-09-27 PROCEDURE — 6370000000 HC RX 637 (ALT 250 FOR IP): Performed by: INTERNAL MEDICINE

## 2019-09-27 PROCEDURE — 1240000000 HC EMOTIONAL WELLNESS R&B

## 2019-09-27 PROCEDURE — 99232 SBSQ HOSP IP/OBS MODERATE 35: CPT | Performed by: PSYCHIATRY & NEUROLOGY

## 2019-09-27 PROCEDURE — 6370000000 HC RX 637 (ALT 250 FOR IP): Performed by: PSYCHIATRY & NEUROLOGY

## 2019-09-27 RX ORDER — METOPROLOL TARTRATE 50 MG/1
50 TABLET, FILM COATED ORAL 2 TIMES DAILY
Status: DISCONTINUED | OUTPATIENT
Start: 2019-09-27 | End: 2019-09-30

## 2019-09-27 RX ADMIN — FOLIC ACID 1 MG: 1 TABLET ORAL at 08:25

## 2019-09-27 RX ADMIN — AMLODIPINE BESYLATE 10 MG: 10 TABLET ORAL at 08:26

## 2019-09-27 RX ADMIN — SERTRALINE HYDROCHLORIDE 25 MG: 25 TABLET ORAL at 08:25

## 2019-09-27 RX ADMIN — METOPROLOL TARTRATE 50 MG: 50 TABLET ORAL at 13:53

## 2019-09-27 RX ADMIN — METOPROLOL TARTRATE 50 MG: 50 TABLET ORAL at 20:40

## 2019-09-27 RX ADMIN — Medication 100 MG: at 08:25

## 2019-09-27 RX ADMIN — DIVALPROEX SODIUM 250 MG: 250 TABLET, DELAYED RELEASE ORAL at 20:39

## 2019-09-27 RX ADMIN — DIVALPROEX SODIUM 250 MG: 250 TABLET, DELAYED RELEASE ORAL at 08:25

## 2019-09-27 RX ADMIN — CHLORDIAZEPOXIDE HYDROCHLORIDE 10 MG: 10 CAPSULE ORAL at 16:26

## 2019-09-27 RX ADMIN — CHLORDIAZEPOXIDE HYDROCHLORIDE 10 MG: 10 CAPSULE ORAL at 20:39

## 2019-09-27 RX ADMIN — THERA TABS 1 TABLET: TAB at 08:26

## 2019-09-27 RX ADMIN — CHLORDIAZEPOXIDE HYDROCHLORIDE 10 MG: 10 CAPSULE ORAL at 08:25

## 2019-09-28 PROBLEM — F10.20 ALCOHOL DEPENDENCE (HCC): Status: ACTIVE | Noted: 2019-09-28

## 2019-09-28 LAB
EKG ATRIAL RATE: 81 BPM
EKG ATRIAL RATE: 87 BPM
EKG P AXIS: 48 DEGREES
EKG P AXIS: 60 DEGREES
EKG P-R INTERVAL: 106 MS
EKG P-R INTERVAL: 114 MS
EKG Q-T INTERVAL: 370 MS
EKG Q-T INTERVAL: 374 MS
EKG QRS DURATION: 78 MS
EKG QRS DURATION: 86 MS
EKG QTC CALCULATION (BAZETT): 434 MS
EKG QTC CALCULATION (BAZETT): 445 MS
EKG R AXIS: 24 DEGREES
EKG R AXIS: 27 DEGREES
EKG T AXIS: 46 DEGREES
EKG T AXIS: 47 DEGREES
EKG VENTRICULAR RATE: 81 BPM
EKG VENTRICULAR RATE: 87 BPM
LV EF: 65 %
LVEF MODALITY: NORMAL
TSH REFLEX: 2.87 UIU/ML (ref 0.44–3.86)

## 2019-09-28 PROCEDURE — 1240000000 HC EMOTIONAL WELLNESS R&B

## 2019-09-28 PROCEDURE — 84443 ASSAY THYROID STIM HORMONE: CPT

## 2019-09-28 PROCEDURE — 36415 COLL VENOUS BLD VENIPUNCTURE: CPT

## 2019-09-28 PROCEDURE — 6370000000 HC RX 637 (ALT 250 FOR IP): Performed by: INTERNAL MEDICINE

## 2019-09-28 PROCEDURE — 6370000000 HC RX 637 (ALT 250 FOR IP): Performed by: PSYCHIATRY & NEUROLOGY

## 2019-09-28 PROCEDURE — 93005 ELECTROCARDIOGRAM TRACING: CPT | Performed by: INTERNAL MEDICINE

## 2019-09-28 PROCEDURE — 93306 TTE W/DOPPLER COMPLETE: CPT

## 2019-09-28 RX ORDER — SERTRALINE HYDROCHLORIDE 100 MG/1
100 TABLET, FILM COATED ORAL DAILY
Status: DISCONTINUED | OUTPATIENT
Start: 2019-09-29 | End: 2019-10-01 | Stop reason: HOSPADM

## 2019-09-28 RX ADMIN — TRAZODONE HYDROCHLORIDE 50 MG: 50 TABLET ORAL at 20:38

## 2019-09-28 RX ADMIN — Medication 100 MG: at 09:20

## 2019-09-28 RX ADMIN — DIVALPROEX SODIUM 250 MG: 250 TABLET, DELAYED RELEASE ORAL at 09:20

## 2019-09-28 RX ADMIN — METOPROLOL TARTRATE 50 MG: 50 TABLET ORAL at 20:38

## 2019-09-28 RX ADMIN — THERA TABS 1 TABLET: TAB at 09:19

## 2019-09-28 RX ADMIN — METOPROLOL TARTRATE 50 MG: 50 TABLET ORAL at 09:20

## 2019-09-28 RX ADMIN — FOLIC ACID 1 MG: 1 TABLET ORAL at 09:20

## 2019-09-28 RX ADMIN — SERTRALINE HYDROCHLORIDE 50 MG: 50 TABLET ORAL at 09:20

## 2019-09-28 RX ADMIN — DIVALPROEX SODIUM 250 MG: 250 TABLET, DELAYED RELEASE ORAL at 20:38

## 2019-09-29 LAB — VALPROIC ACID LEVEL: 22.9 UG/ML (ref 50–100)

## 2019-09-29 PROCEDURE — 80164 ASSAY DIPROPYLACETIC ACD TOT: CPT

## 2019-09-29 PROCEDURE — 6370000000 HC RX 637 (ALT 250 FOR IP): Performed by: INTERNAL MEDICINE

## 2019-09-29 PROCEDURE — 6370000000 HC RX 637 (ALT 250 FOR IP): Performed by: PSYCHIATRY & NEUROLOGY

## 2019-09-29 PROCEDURE — 36415 COLL VENOUS BLD VENIPUNCTURE: CPT

## 2019-09-29 PROCEDURE — 1240000000 HC EMOTIONAL WELLNESS R&B

## 2019-09-29 RX ORDER — DIVALPROEX SODIUM 500 MG/1
500 TABLET, DELAYED RELEASE ORAL EVERY 12 HOURS SCHEDULED
Status: DISCONTINUED | OUTPATIENT
Start: 2019-09-29 | End: 2019-10-01 | Stop reason: HOSPADM

## 2019-09-29 RX ADMIN — TRAZODONE HYDROCHLORIDE 50 MG: 50 TABLET ORAL at 20:19

## 2019-09-29 RX ADMIN — HYDROXYZINE PAMOATE 50 MG: 50 CAPSULE ORAL at 20:18

## 2019-09-29 RX ADMIN — METOPROLOL TARTRATE 50 MG: 50 TABLET ORAL at 08:56

## 2019-09-29 RX ADMIN — THERA TABS 1 TABLET: TAB at 08:56

## 2019-09-29 RX ADMIN — Medication 100 MG: at 08:56

## 2019-09-29 RX ADMIN — SERTRALINE 100 MG: 100 TABLET, FILM COATED ORAL at 08:56

## 2019-09-29 RX ADMIN — FOLIC ACID 1 MG: 1 TABLET ORAL at 08:56

## 2019-09-29 RX ADMIN — METOPROLOL TARTRATE 50 MG: 50 TABLET ORAL at 20:18

## 2019-09-29 RX ADMIN — DIVALPROEX SODIUM 500 MG: 500 TABLET, DELAYED RELEASE ORAL at 20:18

## 2019-09-29 RX ADMIN — DIVALPROEX SODIUM 250 MG: 250 TABLET, DELAYED RELEASE ORAL at 08:56

## 2019-09-30 PROCEDURE — 1240000000 HC EMOTIONAL WELLNESS R&B

## 2019-09-30 PROCEDURE — 6370000000 HC RX 637 (ALT 250 FOR IP): Performed by: INTERNAL MEDICINE

## 2019-09-30 PROCEDURE — 99231 SBSQ HOSP IP/OBS SF/LOW 25: CPT | Performed by: PSYCHIATRY & NEUROLOGY

## 2019-09-30 PROCEDURE — 6370000000 HC RX 637 (ALT 250 FOR IP): Performed by: PSYCHIATRY & NEUROLOGY

## 2019-09-30 PROCEDURE — 90833 PSYTX W PT W E/M 30 MIN: CPT | Performed by: PSYCHIATRY & NEUROLOGY

## 2019-09-30 RX ORDER — NICOTINE 21 MG/24HR
1 PATCH, TRANSDERMAL 24 HOURS TRANSDERMAL DAILY
Status: DISCONTINUED | OUTPATIENT
Start: 2019-09-30 | End: 2019-10-01 | Stop reason: HOSPADM

## 2019-09-30 RX ORDER — NICOTINE 21 MG/24HR
1 PATCH, TRANSDERMAL 24 HOURS TRANSDERMAL DAILY
Qty: 30 PATCH | Refills: 1 | Status: SHIPPED | OUTPATIENT
Start: 2019-09-30

## 2019-09-30 RX ORDER — METOPROLOL TARTRATE 50 MG/1
100 TABLET, FILM COATED ORAL 2 TIMES DAILY
Status: DISCONTINUED | OUTPATIENT
Start: 2019-09-30 | End: 2019-10-01 | Stop reason: HOSPADM

## 2019-09-30 RX ORDER — METOPROLOL TARTRATE 100 MG/1
100 TABLET ORAL 2 TIMES DAILY
Qty: 60 TABLET | Refills: 5 | Status: SHIPPED | OUTPATIENT
Start: 2019-09-30

## 2019-09-30 RX ADMIN — METOPROLOL TARTRATE 50 MG: 50 TABLET ORAL at 08:50

## 2019-09-30 RX ADMIN — TRAZODONE HYDROCHLORIDE 50 MG: 50 TABLET ORAL at 20:36

## 2019-09-30 RX ADMIN — METOPROLOL TARTRATE 100 MG: 50 TABLET ORAL at 20:36

## 2019-09-30 RX ADMIN — FOLIC ACID 1 MG: 1 TABLET ORAL at 08:50

## 2019-09-30 RX ADMIN — Medication 100 MG: at 08:50

## 2019-09-30 RX ADMIN — DIVALPROEX SODIUM 500 MG: 500 TABLET, DELAYED RELEASE ORAL at 08:50

## 2019-09-30 RX ADMIN — THERA TABS 1 TABLET: TAB at 08:50

## 2019-09-30 RX ADMIN — SERTRALINE 100 MG: 100 TABLET, FILM COATED ORAL at 08:50

## 2019-09-30 RX ADMIN — HYDROXYZINE PAMOATE 50 MG: 50 CAPSULE ORAL at 20:36

## 2019-09-30 RX ADMIN — DIVALPROEX SODIUM 500 MG: 500 TABLET, DELAYED RELEASE ORAL at 20:36

## 2019-10-01 VITALS
HEART RATE: 98 BPM | OXYGEN SATURATION: 97 % | TEMPERATURE: 98 F | DIASTOLIC BLOOD PRESSURE: 98 MMHG | SYSTOLIC BLOOD PRESSURE: 137 MMHG | RESPIRATION RATE: 16 BRPM

## 2019-10-01 PROCEDURE — 6370000000 HC RX 637 (ALT 250 FOR IP): Performed by: PSYCHIATRY & NEUROLOGY

## 2019-10-01 PROCEDURE — 99239 HOSP IP/OBS DSCHRG MGMT >30: CPT | Performed by: PSYCHIATRY & NEUROLOGY

## 2019-10-01 PROCEDURE — 6370000000 HC RX 637 (ALT 250 FOR IP): Performed by: INTERNAL MEDICINE

## 2019-10-01 RX ORDER — TRAZODONE HYDROCHLORIDE 50 MG/1
50 TABLET ORAL NIGHTLY
Qty: 14 TABLET | Refills: 0 | Status: SHIPPED | OUTPATIENT
Start: 2019-10-01 | End: 2019-10-01

## 2019-10-01 RX ORDER — TRAZODONE HYDROCHLORIDE 50 MG/1
50 TABLET ORAL NIGHTLY
Qty: 15 TABLET | Refills: 2 | Status: SHIPPED | OUTPATIENT
Start: 2019-10-01

## 2019-10-01 RX ORDER — DIVALPROEX SODIUM 500 MG/1
500 TABLET, DELAYED RELEASE ORAL EVERY 12 HOURS SCHEDULED
Qty: 30 TABLET | Refills: 2 | Status: SHIPPED | OUTPATIENT
Start: 2019-10-01

## 2019-10-01 RX ORDER — SERTRALINE HYDROCHLORIDE 100 MG/1
100 TABLET, FILM COATED ORAL DAILY
Qty: 14 TABLET | Refills: 0 | Status: SHIPPED | OUTPATIENT
Start: 2019-10-02 | End: 2019-10-01

## 2019-10-01 RX ORDER — SERTRALINE HYDROCHLORIDE 100 MG/1
100 TABLET, FILM COATED ORAL DAILY
Qty: 15 TABLET | Refills: 2 | Status: SHIPPED | OUTPATIENT
Start: 2019-10-02

## 2019-10-01 RX ORDER — DIVALPROEX SODIUM 500 MG/1
500 TABLET, DELAYED RELEASE ORAL EVERY 12 HOURS SCHEDULED
Qty: 28 TABLET | Refills: 0 | Status: SHIPPED | OUTPATIENT
Start: 2019-10-01 | End: 2019-10-01

## 2019-10-01 RX ADMIN — FOLIC ACID 1 MG: 1 TABLET ORAL at 08:45

## 2019-10-01 RX ADMIN — SERTRALINE 100 MG: 100 TABLET, FILM COATED ORAL at 08:45

## 2019-10-01 RX ADMIN — DIVALPROEX SODIUM 500 MG: 500 TABLET, DELAYED RELEASE ORAL at 08:45

## 2019-10-01 RX ADMIN — THERA TABS 1 TABLET: TAB at 08:45

## 2019-10-01 RX ADMIN — METOPROLOL TARTRATE 100 MG: 50 TABLET ORAL at 08:45

## 2019-10-01 RX ADMIN — Medication 100 MG: at 08:46

## 2019-10-24 ENCOUNTER — HOSPITAL ENCOUNTER (EMERGENCY)
Age: 44
Discharge: PSYCHIATRIC OR SUBSTANCE USE FACILITY | End: 2019-10-26
Attending: EMERGENCY MEDICINE

## 2019-10-24 DIAGNOSIS — R45.851 SUICIDAL IDEATION: Primary | ICD-10-CM

## 2019-10-24 LAB
BILIRUBIN URINE: NEGATIVE
BLOOD, URINE: NEGATIVE
CLARITY: CLEAR
COLOR: YELLOW
GLUCOSE URINE: NEGATIVE MG/DL
KETONES, URINE: NEGATIVE MG/DL
LEUKOCYTE ESTERASE, URINE: NEGATIVE
NITRITE, URINE: NEGATIVE
PH UA: 5 (ref 5–9)
PROTEIN UA: NEGATIVE MG/DL
SPECIFIC GRAVITY UA: 1.01 (ref 1–1.03)
URINE REFLEX TO CULTURE: NORMAL
UROBILINOGEN, URINE: 0.2 E.U./DL

## 2019-10-24 PROCEDURE — 36415 COLL VENOUS BLD VENIPUNCTURE: CPT

## 2019-10-24 PROCEDURE — 80061 LIPID PANEL: CPT

## 2019-10-24 PROCEDURE — 80164 ASSAY DIPROPYLACETIC ACD TOT: CPT

## 2019-10-24 PROCEDURE — 99285 EMERGENCY DEPT VISIT HI MDM: CPT

## 2019-10-24 PROCEDURE — 80306 DRUG TEST PRSMV INSTRMNT: CPT

## 2019-10-24 PROCEDURE — G0480 DRUG TEST DEF 1-7 CLASSES: HCPCS

## 2019-10-24 PROCEDURE — 82550 ASSAY OF CK (CPK): CPT

## 2019-10-24 PROCEDURE — 85025 COMPLETE CBC W/AUTO DIFF WBC: CPT

## 2019-10-24 PROCEDURE — 80053 COMPREHEN METABOLIC PANEL: CPT

## 2019-10-24 PROCEDURE — 81003 URINALYSIS AUTO W/O SCOPE: CPT

## 2019-10-24 PROCEDURE — 84443 ASSAY THYROID STIM HORMONE: CPT

## 2019-10-24 RX ORDER — ZIPRASIDONE MESYLATE 20 MG/ML
INJECTION, POWDER, LYOPHILIZED, FOR SOLUTION INTRAMUSCULAR
Status: COMPLETED
Start: 2019-10-24 | End: 2019-10-25

## 2019-10-24 RX ORDER — LORAZEPAM 2 MG/ML
2 INJECTION INTRAMUSCULAR ONCE
Status: COMPLETED | OUTPATIENT
Start: 2019-10-25 | End: 2019-10-25

## 2019-10-24 RX ORDER — DIPHENHYDRAMINE HYDROCHLORIDE 50 MG/ML
50 INJECTION INTRAMUSCULAR; INTRAVENOUS ONCE
Status: COMPLETED | OUTPATIENT
Start: 2019-10-25 | End: 2019-10-25

## 2019-10-24 RX ORDER — DIPHENHYDRAMINE HYDROCHLORIDE 50 MG/ML
INJECTION INTRAMUSCULAR; INTRAVENOUS
Status: COMPLETED
Start: 2019-10-24 | End: 2019-10-25

## 2019-10-24 RX ORDER — LORAZEPAM 2 MG/ML
INJECTION INTRAMUSCULAR
Status: COMPLETED
Start: 2019-10-24 | End: 2019-10-25

## 2019-10-24 RX ORDER — DIPHENHYDRAMINE HYDROCHLORIDE 50 MG/ML
50 INJECTION INTRAMUSCULAR; INTRAVENOUS ONCE
Status: DISCONTINUED | OUTPATIENT
Start: 2019-10-24 | End: 2019-10-24

## 2019-10-24 RX ORDER — ZIPRASIDONE MESYLATE 20 MG/ML
20 INJECTION, POWDER, LYOPHILIZED, FOR SOLUTION INTRAMUSCULAR ONCE
Status: COMPLETED | OUTPATIENT
Start: 2019-10-24 | End: 2019-10-25

## 2019-10-24 RX ORDER — LORAZEPAM 2 MG/ML
2 INJECTION INTRAMUSCULAR ONCE
Status: DISCONTINUED | OUTPATIENT
Start: 2019-10-24 | End: 2019-10-24

## 2019-10-24 ASSESSMENT — ENCOUNTER SYMPTOMS
CHEST TIGHTNESS: 0
SORE THROAT: 0
NAUSEA: 0
COUGH: 0
SHORTNESS OF BREATH: 0
ABDOMINAL PAIN: 0
ABDOMINAL DISTENTION: 0
PHOTOPHOBIA: 0
VOMITING: 0
BACK PAIN: 0
EYE DISCHARGE: 0
DIARRHEA: 0
WHEEZING: 0

## 2019-10-25 LAB
ACETAMINOPHEN LEVEL: <5 UG/ML (ref 10–30)
ALBUMIN SERPL-MCNC: 4.2 G/DL (ref 3.5–4.6)
ALP BLD-CCNC: 78 U/L (ref 35–104)
ALT SERPL-CCNC: 11 U/L (ref 0–41)
AMPHETAMINE SCREEN, URINE: NORMAL
ANION GAP SERPL CALCULATED.3IONS-SCNC: 18 MEQ/L (ref 9–15)
AST SERPL-CCNC: 16 U/L (ref 0–40)
BARBITURATE SCREEN URINE: NORMAL
BASOPHILS ABSOLUTE: 0.2 K/UL (ref 0–0.2)
BASOPHILS RELATIVE PERCENT: 2.4 %
BENZODIAZEPINE SCREEN, URINE: NORMAL
BILIRUB SERPL-MCNC: <0.2 MG/DL (ref 0.2–0.7)
BUN BLDV-MCNC: 11 MG/DL (ref 6–20)
CALCIUM SERPL-MCNC: 8.3 MG/DL (ref 8.5–9.9)
CANNABINOID SCREEN URINE: NORMAL
CHLORIDE BLD-SCNC: 106 MEQ/L (ref 95–107)
CHOLESTEROL, TOTAL: 146 MG/DL (ref 0–199)
CO2: 20 MEQ/L (ref 20–31)
COCAINE METABOLITE SCREEN URINE: NORMAL
CREAT SERPL-MCNC: 0.79 MG/DL (ref 0.7–1.2)
EKG ATRIAL RATE: 99 BPM
EKG P AXIS: 62 DEGREES
EKG P-R INTERVAL: 118 MS
EKG Q-T INTERVAL: 352 MS
EKG QRS DURATION: 86 MS
EKG QTC CALCULATION (BAZETT): 451 MS
EKG R AXIS: 9 DEGREES
EKG T AXIS: 42 DEGREES
EKG VENTRICULAR RATE: 99 BPM
EOSINOPHILS ABSOLUTE: 0.1 K/UL (ref 0–0.7)
EOSINOPHILS RELATIVE PERCENT: 1.7 %
ETHANOL PERCENT: 0.08 G/DL
ETHANOL PERCENT: 0.09 G/DL
ETHANOL PERCENT: 0.2 G/DL
ETHANOL: 107 MG/DL (ref 0–0.08)
ETHANOL: 231 MG/DL (ref 0–0.08)
ETHANOL: 92 MG/DL (ref 0–0.08)
GFR AFRICAN AMERICAN: >60
GFR NON-AFRICAN AMERICAN: >60
GLOBULIN: 2.7 G/DL (ref 2.3–3.5)
GLUCOSE BLD-MCNC: 96 MG/DL (ref 70–99)
HCT VFR BLD CALC: 44.7 % (ref 42–52)
HDLC SERPL-MCNC: 55 MG/DL (ref 40–59)
HEMOGLOBIN: 15 G/DL (ref 14–18)
LDL CHOLESTEROL CALCULATED: 71 MG/DL (ref 0–129)
LYMPHOCYTES ABSOLUTE: 2.1 K/UL (ref 1–4.8)
LYMPHOCYTES RELATIVE PERCENT: 24.9 %
Lab: NORMAL
MCH RBC QN AUTO: 32.1 PG (ref 27–31.3)
MCHC RBC AUTO-ENTMCNC: 33.6 % (ref 33–37)
MCV RBC AUTO: 95.5 FL (ref 80–100)
METHADONE SCREEN, URINE: NORMAL
MONOCYTES ABSOLUTE: 0.6 K/UL (ref 0.2–0.8)
MONOCYTES RELATIVE PERCENT: 6.5 %
NEUTROPHILS ABSOLUTE: 5.5 K/UL (ref 1.4–6.5)
NEUTROPHILS RELATIVE PERCENT: 64.5 %
OPIATE SCREEN URINE: NORMAL
PDW BLD-RTO: 13 % (ref 11.5–14.5)
PHENCYCLIDINE SCREEN URINE: NORMAL
PLATELET # BLD: 235 K/UL (ref 130–400)
POTASSIUM SERPL-SCNC: 4.3 MEQ/L (ref 3.4–4.9)
PROPOXYPHENE SCREEN, URINE: NORMAL
RBC # BLD: 4.68 M/UL (ref 4.7–6.1)
SALICYLATE, SERUM: 1.1 MG/DL (ref 15–30)
SODIUM BLD-SCNC: 144 MEQ/L (ref 135–144)
TOTAL CK: 121 U/L (ref 0–190)
TOTAL PROTEIN: 6.9 G/DL (ref 6.3–8)
TRIGL SERPL-MCNC: 99 MG/DL (ref 0–150)
TSH SERPL DL<=0.05 MIU/L-ACNC: 0.91 UIU/ML (ref 0.44–3.86)
UR OXYCODONE RAPID SCREEN: NORMAL
VALPROIC ACID LEVEL: <2.8 UG/ML (ref 50–100)
WBC # BLD: 8.5 K/UL (ref 4.8–10.8)

## 2019-10-25 PROCEDURE — 93005 ELECTROCARDIOGRAM TRACING: CPT | Performed by: EMERGENCY MEDICINE

## 2019-10-25 PROCEDURE — 6360000002 HC RX W HCPCS

## 2019-10-25 PROCEDURE — 6370000000 HC RX 637 (ALT 250 FOR IP): Performed by: PHYSICIAN ASSISTANT

## 2019-10-25 PROCEDURE — 2580000003 HC RX 258

## 2019-10-25 PROCEDURE — 36415 COLL VENOUS BLD VENIPUNCTURE: CPT

## 2019-10-25 PROCEDURE — G0480 DRUG TEST DEF 1-7 CLASSES: HCPCS

## 2019-10-25 PROCEDURE — 96372 THER/PROPH/DIAG INJ SC/IM: CPT

## 2019-10-25 RX ORDER — NICOTINE 21 MG/24HR
1 PATCH, TRANSDERMAL 24 HOURS TRANSDERMAL ONCE
Status: COMPLETED | OUTPATIENT
Start: 2019-10-25 | End: 2019-10-26

## 2019-10-25 RX ADMIN — DIPHENHYDRAMINE HYDROCHLORIDE 50 MG: 50 INJECTION INTRAMUSCULAR; INTRAVENOUS at 00:06

## 2019-10-25 RX ADMIN — ZIPRASIDONE MESYLATE 20 MG: 20 INJECTION, POWDER, LYOPHILIZED, FOR SOLUTION INTRAMUSCULAR at 00:05

## 2019-10-25 RX ADMIN — LORAZEPAM 2 MG: 2 INJECTION INTRAMUSCULAR; INTRAVENOUS at 00:08

## 2019-10-25 RX ADMIN — LORAZEPAM 2 MG: 2 INJECTION INTRAMUSCULAR at 00:08

## 2019-10-25 RX ADMIN — WATER 10 ML: 1 INJECTION INTRAMUSCULAR; INTRAVENOUS; SUBCUTANEOUS at 00:08

## 2019-10-25 RX ADMIN — DIPHENHYDRAMINE HYDROCHLORIDE 50 MG: 50 INJECTION, SOLUTION INTRAMUSCULAR; INTRAVENOUS at 00:06

## 2019-10-26 VITALS
HEART RATE: 95 BPM | SYSTOLIC BLOOD PRESSURE: 137 MMHG | DIASTOLIC BLOOD PRESSURE: 94 MMHG | OXYGEN SATURATION: 99 % | TEMPERATURE: 98.3 F | BODY MASS INDEX: 21.47 KG/M2 | HEIGHT: 70 IN | RESPIRATION RATE: 20 BRPM | WEIGHT: 150 LBS

## 2019-10-26 ASSESSMENT — ENCOUNTER SYMPTOMS
PHOTOPHOBIA: 0
NAUSEA: 0
CHEST TIGHTNESS: 0
WHEEZING: 0
ABDOMINAL PAIN: 0
SHORTNESS OF BREATH: 0
VOMITING: 0
DIARRHEA: 0
BACK PAIN: 0
SORE THROAT: 0
ABDOMINAL DISTENTION: 0
EYE DISCHARGE: 0
COUGH: 0

## 2019-10-28 PROCEDURE — 93010 ELECTROCARDIOGRAM REPORT: CPT | Performed by: INTERNAL MEDICINE

## 2020-04-12 ENCOUNTER — HOSPITAL ENCOUNTER (EMERGENCY)
Age: 45
Discharge: LEFT BEFORE DISCHARGE | End: 2020-04-12
Attending: NEUROMUSCULOSKELETAL MEDICINE, SPORTS MEDICINE
Payer: MEDICAID

## 2020-04-12 VITALS
RESPIRATION RATE: 14 BRPM | DIASTOLIC BLOOD PRESSURE: 82 MMHG | SYSTOLIC BLOOD PRESSURE: 127 MMHG | HEART RATE: 76 BPM | OXYGEN SATURATION: 97 %

## 2020-04-12 PROCEDURE — 99283 EMERGENCY DEPT VISIT LOW MDM: CPT

## 2020-04-12 ASSESSMENT — ENCOUNTER SYMPTOMS
DIARRHEA: 0
COUGH: 0
RHINORRHEA: 0
SORE THROAT: 0
COLOR CHANGE: 0
VOMITING: 0
BLOOD IN STOOL: 0
ABDOMINAL PAIN: 0
SHORTNESS OF BREATH: 0
NAUSEA: 0

## 2020-04-13 ENCOUNTER — HOSPITAL ENCOUNTER (EMERGENCY)
Age: 45
Discharge: HOME | End: 2020-04-13
Payer: MEDICAID

## 2020-04-13 VITALS
BODY MASS INDEX: 22.96 KG/M2 | DIASTOLIC BLOOD PRESSURE: 104 MMHG | TEMPERATURE: 97.9 F | RESPIRATION RATE: 18 BRPM | HEIGHT: 69 IN | WEIGHT: 155 LBS | OXYGEN SATURATION: 97 % | SYSTOLIC BLOOD PRESSURE: 147 MMHG | HEART RATE: 96 BPM

## 2020-04-13 PROCEDURE — 99284 EMERGENCY DEPT VISIT MOD MDM: CPT

## 2020-04-13 ASSESSMENT — ENCOUNTER SYMPTOMS
SORE THROAT: 0
COLOR CHANGE: 0
DIARRHEA: 0
ABDOMINAL PAIN: 0
RHINORRHEA: 0
VOMITING: 0
RECTAL PAIN: 0
ABDOMINAL DISTENTION: 0
CONSTIPATION: 0
NAUSEA: 0
EYE DISCHARGE: 0
SHORTNESS OF BREATH: 0

## 2020-04-13 NOTE — ED PROVIDER NOTES
cardiologist.        RADIOLOGY:   Non-plain film images such as CT, Ultrasound and MRI are read by theradiologist. Plain radiographic images are visualized and preliminarily interpreted by the emergency physician with the below findings:    Interpretation per theRadiologist below, if available at the time of this note:    No orders to display           LABS:  Labs Reviewed - No data to display    All other labs were within normal range or not returned as of this dictation. EMERGENCY DEPARTMENT COURSE and DIFFERENTIAL DIAGNOSIS/MDM:   Vitals:    Vitals:    04/12/20 2254   BP: 127/82   Pulse: 76   Resp: 14   SpO2: 97%         MDM    Pt initially refused vital signs. He does not want to be seen and facility made him come in. He is aware of the high risk of death d/t ETOH and fall. He says he didn't hit his head that hard and blocked his fall with his hand. No blood thinners and patient asymptomatic. He is alert and oriented x3 and competent to make decisions. He has a steady gait and clear speech. No neurological deficits. I spoke to him for approximately 10 minutes concerning the high risk of bleed and asking him to simply get a CAT scan of his head which only take a few minutes. I then also offered him if we can keep him in the ER just to monitor him and reassess him neurologically. He has refused all of this. Patient is cooperative and pleasant. Again I made him aware of the high probability of possible death if he has intracranial process or bleed, or fractures. He states he is okay if he does die. Patient again is refusing all care. He did clean himself off with a washcloth. he was sent back to [facility]. Standard anticipatory guidance given to patient upon discharge. Have given them a specific time frame in which to follow-up and who to follow-up with.  I have also advised them that they should return to the emergency department if they get worse, or not getting better or develop any new or

## 2020-04-13 NOTE — ED PROVIDER NOTES
[address] ED  eMERGENCY dEPARTMENT eNCOUnter      Pt Name: Tara Romberg  MRN: [id number]  [place] 1975  Date of evaluation: 4/13/2020  Provider: Alfonso Fowler PA-C    CHIEF COMPLAINT       Chief Complaint   Patient presents with    Other     intoxicated         HISTORY OF PRESENT ILLNESS   (Location/Symptom, Timing/Onset,Context/Setting, Quality, Duration, Modifying Factors, Severity)  Note limiting factors. Tara Romberg is a 45 y.o. male who presents to the emergency department due to being transported to the hospital for public intoxication. Patient was urinating in the building at [facility], he was transported to the hospital for intoxication. Patient is uncooperative. Does not want any evaluation. Patient was advised he can be discharged with a sober ride, or at the time that he is legally sober but otherwise he would have to remain until such time he is safe to be discharged on his own. HPI    NursingNotes were reviewed. REVIEW OF SYSTEMS    (2-9 systems for level 4, 10 or more for level 5)     Review of Systems   Constitutional: Negative for activity change, appetite change, fatigue, fever and unexpected weight change. HENT: Negative for congestion, ear discharge, ear pain, nosebleeds, rhinorrhea and sore throat. Eyes: Negative for discharge. Respiratory: Negative for shortness of breath. Cardiovascular: Negative for chest pain, palpitations and leg swelling. Gastrointestinal: Negative for abdominal distention, abdominal pain, constipation, diarrhea, nausea, rectal pain and vomiting. Genitourinary: Negative for difficulty urinating and dysuria. Musculoskeletal: Negative for arthralgias. Skin: Negative for color change, pallor, rash and wound. Minor abrasions noted to bridge of nose. Neurological: Negative for dizziness, syncope, numbness and headaches. Psychiatric/Behavioral: Negative for agitation and confusion.        Except as noted above Lifestyle    Physical activity     Days per week: Not on file     Minutes per session: Not on file    Stress: Not on file   Relationships    Social connections     Talks on phone: Not on file     Gets together: Not on file     Attends Restorationist service: Not on file     Active member of club or organization: Not on file     Attends meetings of clubs or organizations: Not on file     Relationship status: Not on file    Intimate partner violence     Fear of current or ex partner: Not on file     Emotionally abused: Not on file     Physically abused: Not on file     Forced sexual activity: Not on file   Other Topics Concern    Not on file   Social History Narrative    Not on file       SCREENINGS      @TYMS([id number])@      PHYSICAL EXAM    (up to 7 for level 4, 8 or more for level 5)     ED Triage Vitals [04/13/20 1142]   BP Temp Temp Source Pulse Resp SpO2 Height Weight   (!) 147/104 97.9 °F (36.6 °C) Oral 96 18 97 % 5' 9\" (1.753 m) 155 lb (70.3 kg)       Physical Exam  Constitutional:       General: He is not in acute distress. Appearance: He is well-developed. He is not ill-appearing, toxic-appearing or diaphoretic. Comments: Patient appears acutely intoxicated, with slurring the speech, he does have glazing to his eyes. HENT:      Head: Normocephalic. Right Ear: Tympanic membrane normal.      Left Ear: Tympanic membrane normal.      Nose:      Comments: Abrasion noted to bridge of nose. No crepitus or deformity, no nasal discharge  Eyes:      Extraocular Movements: Extraocular movements intact. Pupils: Pupils are equal, round, and reactive to light. Neck:      Musculoskeletal: Neck supple. Vascular: No JVD. Trachea: No tracheal deviation. Comments: Neck is supple, there is no pain on palpation, full range of motion without any increased pain. Cardiovascular:      Rate and Rhythm: Normal rate.    Pulmonary:      Effort: Pulmonary effort is normal. No respiratory distress. Breath sounds: No wheezing or rales. Chest:      Chest wall: No tenderness. Abdominal:      General: There is no distension. Palpations: There is no mass. Tenderness: There is no abdominal tenderness. There is no right CVA tenderness, left CVA tenderness, guarding or rebound. Musculoskeletal:         General: No deformity. Comments: Patient is moving all extremities well, refuses to cooperate with examination, he states he does not want anything done. Neurological:      Mental Status: He is alert and oriented to person, place, and time. Coordination: Coordination normal.   Psychiatric:         Mood and Affect: Mood normal.         DIAGNOSTIC RESULTS     EKG: All EKG's are interpreted by the Emergency Department Physician who either signs or Co-signsthis chart in the absence of a cardiologist.        RADIOLOGY:   Orlie Kudo such as CT, Ultrasound and MRI are read by the radiologist. Plain radiographic images are visualized and preliminarily interpreted by the emergency physician with the below findings:        Interpretation per the Radiologist below, if available at the time ofthis note:    No orders to display         ED BEDSIDE ULTRASOUND:   Performed by ED Physician - none    LABS:  Labs Reviewed - No data to display    All other labs were within normal range or not returned as of this dictation. EMERGENCY DEPARTMENT COURSE and DIFFERENTIAL DIAGNOSIS/MDM:   Vitals:    Vitals:    04/13/20 1142   BP: (!) 147/104   Pulse: 96   Resp: 18   Temp: 97.9 °F (36.6 °C)   TempSrc: Oral   SpO2: 97%   Weight: 155 lb (70.3 kg)   Height: 5' 9\" (1.753 m)          MDM  Number of Diagnoses or Management Options  Acute alcoholic intoxication without complication [facility]):   Diagnosis management comments: Patient was brought into the emerge department by the police department since patient was intoxicated and urinating on a wall in [facility].   On arrival patient is alert, he is

## 2020-04-13 NOTE — ED NOTES
Pt was able to walk >200 feet with a steady gait, A&Ox4, able to answer all questions appropriately. Pt states he is ready to leave, [facility] stated pt is able to leave.       Tonny Chester, RN  04/13/20 Michael Parikh, DEBBIE  04/13/20 8707

## 2020-04-16 ENCOUNTER — HOSPITAL ENCOUNTER (EMERGENCY)
Age: 45
Discharge: HOME HEALTH | End: 2020-04-17
Payer: MEDICAID

## 2020-04-16 ENCOUNTER — APPOINTMENT (OUTPATIENT)
Dept: CT IMAGING | Age: 45
End: 2020-04-16
Payer: MEDICAID

## 2020-04-16 ENCOUNTER — APPOINTMENT (OUTPATIENT)
Dept: GENERAL RADIOLOGY | Age: 45
End: 2020-04-16
Payer: MEDICAID

## 2020-04-16 LAB
ANION GAP SERPL CALCULATED.3IONS-SCNC: 18 MEQ/L (ref 9–15)
APTT: 29.2 SEC (ref 24.4–36.8)
BASOPHILS ABSOLUTE: 0.1 K/UL (ref 0–0.2)
BASOPHILS RELATIVE PERCENT: 1.1 %
BUN BLDV-MCNC: 10 MG/DL (ref 6–20)
CALCIUM SERPL-MCNC: 8.5 MG/DL (ref 8.5–9.9)
CHLORIDE BLD-SCNC: 98 MEQ/L (ref 95–107)
CO2: 21 MEQ/L (ref 20–31)
CREAT SERPL-MCNC: 0.63 MG/DL (ref 0.7–1.2)
EOSINOPHILS ABSOLUTE: 0.1 K/UL (ref 0–0.7)
EOSINOPHILS RELATIVE PERCENT: 1.3 %
ETHANOL PERCENT: 0.35 G/DL
ETHANOL: 394 MG/DL (ref 0–0.08)
GFR AFRICAN AMERICAN: >60
GFR NON-AFRICAN AMERICAN: >60
GLUCOSE BLD-MCNC: 94 MG/DL (ref 70–99)
HCT VFR BLD CALC: 44.5 % (ref 42–52)
HEMOGLOBIN: 14.8 G/DL (ref 14–18)
INR BLD: 0.9
LYMPHOCYTES ABSOLUTE: 2.1 K/UL (ref 1–4.8)
LYMPHOCYTES RELATIVE PERCENT: 26.6 %
MCH RBC QN AUTO: 32.8 PG (ref 27–31.3)
MCHC RBC AUTO-ENTMCNC: 33.2 % (ref 33–37)
MCV RBC AUTO: 98.8 FL (ref 80–100)
MONOCYTES ABSOLUTE: 0.8 K/UL (ref 0.2–0.8)
MONOCYTES RELATIVE PERCENT: 9.7 %
NEUTROPHILS ABSOLUTE: 4.9 K/UL (ref 1.4–6.5)
NEUTROPHILS RELATIVE PERCENT: 61.3 %
PDW BLD-RTO: 13.4 % (ref 11.5–14.5)
PLATELET # BLD: 264 K/UL (ref 130–400)
POTASSIUM SERPL-SCNC: 3.5 MEQ/L (ref 3.4–4.9)
PROTHROMBIN TIME: 12.2 SEC (ref 12.3–14.9)
RBC # BLD: 4.51 M/UL (ref 4.7–6.1)
SODIUM BLD-SCNC: 137 MEQ/L (ref 135–144)
WBC # BLD: 8 K/UL (ref 4.8–10.8)

## 2020-04-16 PROCEDURE — 96376 TX/PRO/DX INJ SAME DRUG ADON: CPT

## 2020-04-16 PROCEDURE — 96374 THER/PROPH/DIAG INJ IV PUSH: CPT

## 2020-04-16 PROCEDURE — 6360000002 HC RX W HCPCS: Performed by: PERSONAL EMERGENCY RESPONSE ATTENDANT

## 2020-04-16 PROCEDURE — 70450 CT HEAD/BRAIN W/O DYE: CPT

## 2020-04-16 PROCEDURE — 99285 EMERGENCY DEPT VISIT HI MDM: CPT

## 2020-04-16 PROCEDURE — 80048 BASIC METABOLIC PNL TOTAL CA: CPT

## 2020-04-16 PROCEDURE — 85730 THROMBOPLASTIN TIME PARTIAL: CPT

## 2020-04-16 PROCEDURE — 72125 CT NECK SPINE W/O DYE: CPT

## 2020-04-16 PROCEDURE — G0480 DRUG TEST DEF 1-7 CLASSES: HCPCS

## 2020-04-16 PROCEDURE — 85025 COMPLETE CBC W/AUTO DIFF WBC: CPT

## 2020-04-16 PROCEDURE — 2580000003 HC RX 258: Performed by: PERSONAL EMERGENCY RESPONSE ATTENDANT

## 2020-04-16 PROCEDURE — 70486 CT MAXILLOFACIAL W/O DYE: CPT

## 2020-04-16 PROCEDURE — 96372 THER/PROPH/DIAG INJ SC/IM: CPT

## 2020-04-16 PROCEDURE — 36415 COLL VENOUS BLD VENIPUNCTURE: CPT

## 2020-04-16 PROCEDURE — 85610 PROTHROMBIN TIME: CPT

## 2020-04-16 RX ORDER — LORAZEPAM 2 MG/ML
1 INJECTION INTRAMUSCULAR ONCE
Status: COMPLETED | OUTPATIENT
Start: 2020-04-16 | End: 2020-04-16

## 2020-04-16 RX ORDER — ZIPRASIDONE MESYLATE 20 MG/ML
20 INJECTION, POWDER, LYOPHILIZED, FOR SOLUTION INTRAMUSCULAR ONCE
Status: COMPLETED | OUTPATIENT
Start: 2020-04-16 | End: 2020-04-16

## 2020-04-16 RX ADMIN — LORAZEPAM 1 MG: 2 INJECTION, SOLUTION INTRAMUSCULAR; INTRAVENOUS at 20:13

## 2020-04-16 RX ADMIN — ZIPRASIDONE MESYLATE 20 MG: 20 INJECTION, POWDER, LYOPHILIZED, FOR SOLUTION INTRAMUSCULAR at 20:22

## 2020-04-16 RX ADMIN — LORAZEPAM 1 MG: 2 INJECTION, SOLUTION INTRAMUSCULAR; INTRAVENOUS at 19:32

## 2020-04-16 RX ADMIN — WATER 1.2 ML: 1 INJECTION INTRAMUSCULAR; INTRAVENOUS; SUBCUTANEOUS at 20:22

## 2020-04-16 ASSESSMENT — ENCOUNTER SYMPTOMS
ABDOMINAL PAIN: 0
RHINORRHEA: 0
NAUSEA: 0
SHORTNESS OF BREATH: 0
COLOR CHANGE: 0
SORE THROAT: 0
VOMITING: 0
COUGH: 0
DIARRHEA: 0
BLOOD IN STOOL: 0

## 2020-04-16 NOTE — ED NOTES
Bed: 09  Expected date: 4/16/20  Expected time: 7:07 PM  Means of arrival: [facility]  Comments:  45 M - etoh, fall, ams. 150/100,94,20,97%      Kennon Snellen, RN  04/16/20 1919

## 2020-04-16 NOTE — ED PROVIDER NOTES
[address] [facility] ED  eMERGENCY dEPARTMENT eNCOUnter      Pt Name: Soco Thayer  MRN: [id number]  [place] 1975  Date of evaluation: 4/16/2020  Provider: Venus Henderson S Front  Cintia Perez is a 45 y.o. male with PMHx of arthritis, asthma, hypertension, psychosis presents to the emergency department with alcohol intoxication and fall. Patient does state homeless shelter and does not state how he fell today. He was seen 4/12 for alcohol intoxication and falls but refused to be seen. He was discharged home. He presents today and appears more intoxicated, at times hollering at staff then sleeping in the cart. He denies any physical pain. He is not on blood thinners. HPI    Nursing Notes were reviewed. REVIEW OF SYSTEMS       Review of Systems   Constitutional: Negative for appetite change, chills and fever. HENT: Negative for congestion, rhinorrhea and sore throat. Respiratory: Negative for cough and shortness of breath. Cardiovascular: Negative for chest pain. Gastrointestinal: Negative for abdominal pain, blood in stool, diarrhea, nausea and vomiting. Genitourinary: Negative for difficulty urinating. Musculoskeletal: Negative for neck stiffness. Skin: Negative for color change and rash. Neurological: Negative for dizziness, syncope, weakness, light-headedness, numbness and headaches. All other systems reviewed and are negative.             PAST MEDICAL HISTORY     Past Medical History:   Diagnosis Date    Arthritis     Asthma     Hypertension     Psychiatric problem          SURGICAL HISTORY       Past Surgical History:   Procedure Laterality Date    TOE AMPUTATION Left     fifth toe         CURRENT MEDICATIONS       Previous Medications    DIVALPROEX (DEPAKOTE) 500 MG DR TABLET    Take 1 tablet by mouth every 12 hours    METOPROLOL TARTRATE (LOPRESSOR) 100 MG TABLET    Take 1 tablet by mouth 2 times daily    NICOTINE (José Orly) 14 MG/24HR    Place 1 patch onto the skin daily    SERTRALINE (ZOLOFT) 100 MG TABLET    Take 1 tablet by mouth daily    TRAZODONE (DESYREL) 50 MG TABLET    Take 1 tablet by mouth nightly       ALLERGIES     Patient has no known allergies. FAMILY HISTORY       Family History   Problem Relation Age of Onset    Mental Illness Mother           SOCIAL HISTORY       Social History     Socioeconomic History    Marital status:      Spouse name: None    Number of children: None    Years of education: None    Highest education level: None   Occupational History    None   Social Needs    Financial resource strain: None    Food insecurity     Worry: None     Inability: None    Transportation needs     Medical: None     Non-medical: None   Tobacco Use    Smoking status: Current Every Day Smoker     Packs/day: 1.50     Years: 25.00     Pack years: 37.50     Types: Cigarettes    Smokeless tobacco: Former User   Substance and Sexual Activity    Alcohol use: Yes     Comment: Daily    Drug use: Yes     Types: Marijuana    Sexual activity: None   Lifestyle    Physical activity     Days per week: None     Minutes per session: None    Stress: None   Relationships    Social connections     Talks on phone: None     Gets together: None     Attends Sikh service: None     Active member of club or organization: None     Attends meetings of clubs or organizations: None     Relationship status: None    Intimate partner violence     Fear of current or ex partner: None     Emotionally abused: None     Physically abused: None     Forced sexual activity: None   Other Topics Concern    None   Social History Narrative    None         PHYSICAL EXAM         ED Triage Vitals   BP Temp Temp src Pulse Resp SpO2 Height Weight   -- -- -- -- -- -- -- --       Physical Exam  Constitutional:       Appearance: He is well-developed. HENT:      Head: Normocephalic.       Comments: Old abrasions throughout face, above left eye with minimal ecchymosis under left eye     Right Ear: Tympanic membrane normal.      Left Ear: Tympanic membrane normal.   Eyes:      Conjunctiva/sclera: Conjunctivae normal.      Pupils: Pupils are equal, round, and reactive to light. Neck:      Musculoskeletal: Normal range of motion and neck supple. Trachea: No tracheal deviation. Cardiovascular:      Heart sounds: Normal heart sounds. Pulmonary:      Effort: Pulmonary effort is normal. No respiratory distress. Breath sounds: Normal breath sounds. No stridor. Abdominal:      General: Bowel sounds are normal. There is no distension. Palpations: Abdomen is soft. There is no mass. Tenderness: There is no abdominal tenderness. There is no guarding or rebound. Musculoskeletal: Normal range of motion. Skin:     General: Skin is warm and dry. Capillary Refill: Capillary refill takes less than 2 seconds. Findings: No rash. Neurological:      Mental Status: He is alert and oriented to person, place, and time. Deep Tendon Reflexes: Reflexes are normal and symmetric. Psychiatric:         Behavior: Behavior normal.         Thought Content: Thought content normal.         Judgment: Judgment normal.         DIAGNOSTIC RESULTS     EKG:All EKG's are interpreted by the Emergency Department Physician who either signs or Co-signs this chart in the absence of a cardiologist.        RADIOLOGY:   Non-plain film images such as CT, Ultrasound and MRI are read by theradiologist. Plain radiographic images are visualized and preliminarily interpreted by the emergency physician with the below findings:    Interpretation per theRadiologist below, if available at the time of this note:    CT Head WO Contrast   Final Result      CT Facial Bones WO Contrast   Final Result      EXAM IS LIMITED BY MOTION ARTIFACT.  WITHIN THE LIMITS EXAMINATION NO GROSS ACUTE FRACTURES      All CT scans at this facility use dose modulation, iterative r/o acute process. No restraints needed. He will sleep here and continue to be OBS until morning. Standard anticipatory guidance given to patient upon discharge. Have given them a specific time frame in which to follow-up and who to follow-up with. I have also advised them that they should return to the emergency department if they get worse, or not getting better or develop any new or concerning symptoms. Patient demonstrates understanding. CRITICAL CARE TIME   Total Critical Caretime was 0 minutes, excluding separately reportable procedures. There was a high probability of clinically significant/life threatening deterioration in the patient's condition which required my urgent intervention. Procedures    FINAL IMPRESSION      1. Fall, initial encounter    2. Closed head injury, initial encounter    3. Acute alcoholic intoxication without complication [facility])          DISPOSITION/PLAN   DISPOSITION Ed Observation 04/16/2020 10:47:55 PM      PATIENT REFERRED TO:  [facility] ([facility])  Shayna Mackenzie [place], [address] [facility]  [phone]          DISCHARGE MEDICATIONS:  New Prescriptions    No medications on file          (Please notethat portions of this note were completed with a voice recognition program.  Efforts were made to edit the dictations but occasionally words are mis-transcribed. )    DAILY Swanson (electronically signed)  Emergency Physician Assistant         DAILY Chiang  04/16/20 4836

## 2020-04-16 NOTE — ED TRIAGE NOTES
Pt to ED with complaints of falling. Abrasions, dried blood noted to forehead and bridge of nose. Pt refusing vitals, not answering questions. Rips off blood pressure cuff and pulse ox when attempting to place. Uncooperative with staff. Will attempt to draw blood after Ativan takes effect.

## 2020-04-17 ENCOUNTER — APPOINTMENT (OUTPATIENT)
Dept: GENERAL RADIOLOGY | Age: 45
End: 2020-04-17
Payer: MEDICAID

## 2020-04-17 VITALS
WEIGHT: 160 LBS | HEIGHT: 67 IN | HEART RATE: 100 BPM | OXYGEN SATURATION: 94 % | RESPIRATION RATE: 16 BRPM | DIASTOLIC BLOOD PRESSURE: 64 MMHG | SYSTOLIC BLOOD PRESSURE: 99 MMHG | BODY MASS INDEX: 25.11 KG/M2

## 2020-04-17 PROCEDURE — 71045 X-RAY EXAM CHEST 1 VIEW: CPT

## 2020-04-17 NOTE — ED NOTES
Pt urinating on floor. Blood collected and sent to lab. Pt apologized for urinating on floor.       iFrat Wars, RN  04/16/20 3951

## 2020-04-17 NOTE — ED NOTES
Pt returns from CT Scan. Resting on rt side with sonorous respirations.       Khanh Stallings RN  04/16/20 6726

## 2020-04-17 NOTE — ED NOTES
Pt voided 1000 cc per urinal. Removed IV per self. sts going home. Able to get patient to sign paperwork. Shown to lobby to use telephone to call for ride home. Pt ambulatory with steady gait.       Cierra Dos Santos RN  04/17/20 5713

## 2023-10-07 ENCOUNTER — APPOINTMENT (OUTPATIENT)
Dept: CT IMAGING | Age: 48
End: 2023-10-07
Payer: MEDICAID

## 2023-10-07 ENCOUNTER — HOSPITAL ENCOUNTER (INPATIENT)
Age: 48
LOS: 5 days | Discharge: HOME | End: 2023-10-12
Attending: STUDENT IN AN ORGANIZED HEALTH CARE EDUCATION/TRAINING PROGRAM | Admitting: INTERNAL MEDICINE
Payer: MEDICAID

## 2023-10-07 ENCOUNTER — APPOINTMENT (OUTPATIENT)
Dept: GENERAL RADIOLOGY | Age: 48
End: 2023-10-07
Payer: MEDICAID

## 2023-10-07 DIAGNOSIS — R56.9 SEIZURES (HCC): ICD-10-CM

## 2023-10-07 DIAGNOSIS — F10.931 ALCOHOL WITHDRAWAL SYNDROME, WITH DELIRIUM (HCC): Primary | ICD-10-CM

## 2023-10-07 LAB
ALBUMIN SERPL-MCNC: 4.4 G/DL (ref 3.5–4.6)
ALP SERPL-CCNC: 97 U/L (ref 35–104)
ALT SERPL-CCNC: 28 U/L (ref 0–41)
ANION GAP SERPL CALCULATED.3IONS-SCNC: 19 MEQ/L (ref 9–15)
APAP SERPL-MCNC: <5 UG/ML (ref 10–30)
AST SERPL-CCNC: 45 U/L (ref 0–40)
BASE EXCESS ARTERIAL: 19 (ref -3–3)
BASOPHILS # BLD: 0 K/UL (ref 0–0.2)
BASOPHILS NFR BLD: 0.2 %
BILIRUB SERPL-MCNC: 1.6 MG/DL (ref 0.2–0.7)
BUN SERPL-MCNC: 18 MG/DL (ref 6–20)
CALCIUM IONIZED: 0.78 MMOL/L (ref 1.12–1.32)
CALCIUM SERPL-MCNC: 8.2 MG/DL (ref 8.5–9.9)
CHLORIDE SERPL-SCNC: 62 MEQ/L (ref 95–107)
CHOLEST SERPL-MCNC: 255 MG/DL (ref 0–199)
CK SERPL-CCNC: 165 U/L (ref 0–190)
CO2 SERPL-SCNC: 43 MEQ/L (ref 20–31)
CREAT SERPL-MCNC: 2.11 MG/DL (ref 0.7–1.2)
EOSINOPHIL # BLD: 0 K/UL (ref 0–0.7)
EOSINOPHIL NFR BLD: 0 %
ERYTHROCYTE [DISTWIDTH] IN BLOOD BY AUTOMATED COUNT: 14.2 % (ref 11.5–14.5)
ETHANOL PERCENT: NORMAL G/DL
ETHANOLAMINE SERPL-MCNC: <10 MG/DL (ref 0–0.08)
GLOBULIN SER CALC-MCNC: 2.9 G/DL (ref 2.3–3.5)
GLUCOSE BLD-MCNC: 91 MG/DL (ref 70–99)
GLUCOSE SERPL-MCNC: 103 MG/DL (ref 70–99)
HCO3 ARTERIAL: 41.1 MMOL/L (ref 21–29)
HCT VFR BLD AUTO: 44.5 % (ref 42–52)
HCT VFR BLD AUTO: 49 % (ref 41–53)
HDLC SERPL-MCNC: 161 MG/DL (ref 40–59)
HGB BLD CALC-MCNC: 16.7 GM/DL (ref 13.5–17.5)
HGB BLD-MCNC: 16.1 G/DL (ref 14–18)
LACTATE BLDV-SCNC: <0.2 MMOL/L (ref 0.5–2.2)
LACTATE: 1.95 MMOL/L (ref 0.4–2)
LDLC SERPL CALC-MCNC: 73 MG/DL (ref 0–129)
LYMPHOCYTES # BLD: 0.9 K/UL (ref 1–4.8)
LYMPHOCYTES NFR BLD: 9.2 %
MAGNESIUM SERPL-MCNC: 1.1 MG/DL (ref 1.7–2.4)
MCH RBC QN AUTO: 33.6 PG (ref 27–31.3)
MCHC RBC AUTO-ENTMCNC: 36.2 % (ref 33–37)
MCV RBC AUTO: 92.9 FL (ref 79–92.2)
MONOCYTES # BLD: 1.3 K/UL (ref 0.2–0.8)
MONOCYTES NFR BLD: 12.5 %
NEUTROPHILS # BLD: 7.8 K/UL (ref 1.4–6.5)
NEUTS SEG NFR BLD: 77.4 %
O2 SAT, ARTERIAL: 99 % (ref 93–100)
PCO2 ARTERIAL: 43 MM HG (ref 35–45)
PERFORMED ON: ABNORMAL
PH ARTERIAL: 7.59 (ref 7.35–7.45)
PLATELET # BLD AUTO: 103 K/UL (ref 130–400)
PO2 ARTERIAL: 103 MM HG (ref 75–108)
POC CHLORIDE: 71 MEQ/L (ref 99–110)
POC CREATININE: 1.6 MG/DL (ref 0.8–1.3)
POC FIO2: 2
POC SAMPLE TYPE: ABNORMAL
POTASSIUM SERPL-SCNC: 2.1 MEQ/L (ref 3.5–5.1)
POTASSIUM SERPL-SCNC: 2.5 MEQ/L (ref 3.4–4.9)
PROT SERPL-MCNC: 7.3 G/DL (ref 6.3–8)
RBC # BLD AUTO: 4.79 M/UL (ref 4.7–6.1)
SALICYLATES SERPL-MCNC: <0.3 MG/DL (ref 15–30)
SODIUM BLD-SCNC: 121 MEQ/L (ref 136–145)
SODIUM SERPL-SCNC: 124 MEQ/L (ref 135–144)
TCO2 ARTERIAL: 42 MMOL/L (ref 21–32)
TRIGL SERPL-MCNC: 105 MG/DL (ref 0–150)
TSH REFLEX: 1.6 UIU/ML (ref 0.44–3.86)
WBC # BLD AUTO: 10.1 K/UL (ref 4.8–10.8)

## 2023-10-07 PROCEDURE — 82565 ASSAY OF CREATININE: CPT

## 2023-10-07 PROCEDURE — 36600 WITHDRAWAL OF ARTERIAL BLOOD: CPT

## 2023-10-07 PROCEDURE — 99285 EMERGENCY DEPT VISIT HI MDM: CPT

## 2023-10-07 PROCEDURE — 93005 ELECTROCARDIOGRAM TRACING: CPT | Performed by: STUDENT IN AN ORGANIZED HEALTH CARE EDUCATION/TRAINING PROGRAM

## 2023-10-07 PROCEDURE — 96375 TX/PRO/DX INJ NEW DRUG ADDON: CPT

## 2023-10-07 PROCEDURE — 82077 ASSAY SPEC XCP UR&BREATH IA: CPT

## 2023-10-07 PROCEDURE — 96376 TX/PRO/DX INJ SAME DRUG ADON: CPT

## 2023-10-07 PROCEDURE — 80143 DRUG ASSAY ACETAMINOPHEN: CPT

## 2023-10-07 PROCEDURE — 96360 HYDRATION IV INFUSION INIT: CPT

## 2023-10-07 PROCEDURE — 2000000000 HC ICU R&B

## 2023-10-07 PROCEDURE — 85014 HEMATOCRIT: CPT

## 2023-10-07 PROCEDURE — 82435 ASSAY OF BLOOD CHLORIDE: CPT

## 2023-10-07 PROCEDURE — 85025 COMPLETE CBC W/AUTO DIFF WBC: CPT

## 2023-10-07 PROCEDURE — 84295 ASSAY OF SERUM SODIUM: CPT

## 2023-10-07 PROCEDURE — 71045 X-RAY EXAM CHEST 1 VIEW: CPT

## 2023-10-07 PROCEDURE — 96361 HYDRATE IV INFUSION ADD-ON: CPT

## 2023-10-07 PROCEDURE — 80061 LIPID PANEL: CPT

## 2023-10-07 PROCEDURE — 82330 ASSAY OF CALCIUM: CPT

## 2023-10-07 PROCEDURE — 2500000003 HC RX 250 WO HCPCS: Performed by: STUDENT IN AN ORGANIZED HEALTH CARE EDUCATION/TRAINING PROGRAM

## 2023-10-07 PROCEDURE — 84443 ASSAY THYROID STIM HORMONE: CPT

## 2023-10-07 PROCEDURE — 70450 CT HEAD/BRAIN W/O DYE: CPT

## 2023-10-07 PROCEDURE — 2580000003 HC RX 258: Performed by: STUDENT IN AN ORGANIZED HEALTH CARE EDUCATION/TRAINING PROGRAM

## 2023-10-07 PROCEDURE — 96374 THER/PROPH/DIAG INJ IV PUSH: CPT

## 2023-10-07 PROCEDURE — 84132 ASSAY OF SERUM POTASSIUM: CPT

## 2023-10-07 PROCEDURE — 83735 ASSAY OF MAGNESIUM: CPT

## 2023-10-07 PROCEDURE — 82803 BLOOD GASES ANY COMBINATION: CPT

## 2023-10-07 PROCEDURE — 83605 ASSAY OF LACTIC ACID: CPT

## 2023-10-07 PROCEDURE — 80053 COMPREHEN METABOLIC PANEL: CPT

## 2023-10-07 PROCEDURE — 6360000002 HC RX W HCPCS: Performed by: STUDENT IN AN ORGANIZED HEALTH CARE EDUCATION/TRAINING PROGRAM

## 2023-10-07 PROCEDURE — 80179 DRUG ASSAY SALICYLATE: CPT

## 2023-10-07 PROCEDURE — 82550 ASSAY OF CK (CPK): CPT

## 2023-10-07 PROCEDURE — 36415 COLL VENOUS BLD VENIPUNCTURE: CPT

## 2023-10-07 RX ORDER — LORAZEPAM 2 MG/ML
3 INJECTION INTRAMUSCULAR
Status: DISCONTINUED | OUTPATIENT
Start: 2023-10-07 | End: 2023-10-12 | Stop reason: HOSPADM

## 2023-10-07 RX ORDER — 0.9 % SODIUM CHLORIDE 0.9 %
1000 INTRAVENOUS SOLUTION INTRAVENOUS ONCE
Status: COMPLETED | OUTPATIENT
Start: 2023-10-07 | End: 2023-10-08

## 2023-10-07 RX ORDER — SODIUM CHLORIDE 9 MG/ML
INJECTION, SOLUTION INTRAVENOUS PRN
Status: DISCONTINUED | OUTPATIENT
Start: 2023-10-07 | End: 2023-10-12 | Stop reason: HOSPADM

## 2023-10-07 RX ORDER — LORAZEPAM 2 MG/ML
2 INJECTION INTRAMUSCULAR
Status: DISCONTINUED | OUTPATIENT
Start: 2023-10-07 | End: 2023-10-12 | Stop reason: HOSPADM

## 2023-10-07 RX ORDER — LORAZEPAM 1 MG/1
2 TABLET ORAL
Status: DISCONTINUED | OUTPATIENT
Start: 2023-10-07 | End: 2023-10-12 | Stop reason: HOSPADM

## 2023-10-07 RX ORDER — LORAZEPAM 2 MG/ML
1 INJECTION INTRAMUSCULAR
Status: DISCONTINUED | OUTPATIENT
Start: 2023-10-07 | End: 2023-10-12 | Stop reason: HOSPADM

## 2023-10-07 RX ORDER — PHENOBARBITAL SODIUM 65 MG/ML
65 INJECTION INTRAMUSCULAR 2 TIMES DAILY
Status: DISCONTINUED | OUTPATIENT
Start: 2023-10-08 | End: 2023-10-08

## 2023-10-07 RX ORDER — LORAZEPAM 1 MG/1
4 TABLET ORAL
Status: DISCONTINUED | OUTPATIENT
Start: 2023-10-07 | End: 2023-10-12 | Stop reason: HOSPADM

## 2023-10-07 RX ORDER — PHENOBARBITAL SODIUM 65 MG/ML
130 INJECTION INTRAMUSCULAR
Status: DISCONTINUED | OUTPATIENT
Start: 2023-10-07 | End: 2023-10-12 | Stop reason: HOSPADM

## 2023-10-07 RX ORDER — SODIUM CHLORIDE 0.9 % (FLUSH) 0.9 %
5-40 SYRINGE (ML) INJECTION PRN
Status: DISCONTINUED | OUTPATIENT
Start: 2023-10-07 | End: 2023-10-12 | Stop reason: HOSPADM

## 2023-10-07 RX ORDER — LORAZEPAM 1 MG/1
3 TABLET ORAL
Status: DISCONTINUED | OUTPATIENT
Start: 2023-10-07 | End: 2023-10-12 | Stop reason: HOSPADM

## 2023-10-07 RX ORDER — LORAZEPAM 2 MG/ML
4 INJECTION INTRAMUSCULAR
Status: DISCONTINUED | OUTPATIENT
Start: 2023-10-07 | End: 2023-10-12 | Stop reason: HOSPADM

## 2023-10-07 RX ORDER — LORAZEPAM 1 MG/1
1 TABLET ORAL
Status: DISCONTINUED | OUTPATIENT
Start: 2023-10-07 | End: 2023-10-12 | Stop reason: HOSPADM

## 2023-10-07 RX ORDER — POTASSIUM CHLORIDE 7.45 MG/ML
10 INJECTION INTRAVENOUS
Status: DISPENSED | OUTPATIENT
Start: 2023-10-07 | End: 2023-10-08

## 2023-10-07 RX ORDER — SODIUM CHLORIDE 0.9 % (FLUSH) 0.9 %
5-40 SYRINGE (ML) INJECTION EVERY 12 HOURS SCHEDULED
Status: DISCONTINUED | OUTPATIENT
Start: 2023-10-07 | End: 2023-10-12 | Stop reason: HOSPADM

## 2023-10-07 RX ADMIN — LORAZEPAM 3 MG: 2 INJECTION INTRAMUSCULAR; INTRAVENOUS at 23:15

## 2023-10-07 RX ADMIN — POTASSIUM CHLORIDE 10 MEQ: 10 INJECTION, SOLUTION INTRAVENOUS at 22:44

## 2023-10-07 RX ADMIN — LORAZEPAM 2 MG: 2 INJECTION INTRAMUSCULAR; INTRAVENOUS at 22:08

## 2023-10-07 RX ADMIN — LORAZEPAM 1 MG: 2 INJECTION INTRAMUSCULAR; INTRAVENOUS at 20:27

## 2023-10-07 RX ADMIN — MAGNESIUM SULFATE HEPTAHYDRATE 6000 MG: 500 INJECTION, SOLUTION INTRAMUSCULAR; INTRAVENOUS at 22:51

## 2023-10-07 RX ADMIN — LORAZEPAM 1 MG: 2 INJECTION INTRAMUSCULAR; INTRAVENOUS at 21:34

## 2023-10-07 RX ADMIN — THIAMINE HYDROCHLORIDE: 100 INJECTION, SOLUTION INTRAMUSCULAR; INTRAVENOUS at 20:30

## 2023-10-07 RX ADMIN — LORAZEPAM 2 MG: 2 INJECTION INTRAMUSCULAR; INTRAVENOUS at 21:59

## 2023-10-07 RX ADMIN — SODIUM CHLORIDE 1000 ML: 9 INJECTION, SOLUTION INTRAVENOUS at 22:40

## 2023-10-07 ASSESSMENT — PATIENT HEALTH QUESTIONNAIRE - PHQ9
1. LITTLE INTEREST OR PLEASURE IN DOING THINGS: 0
2. FEELING DOWN, DEPRESSED OR HOPELESS: 0
SUM OF ALL RESPONSES TO PHQ QUESTIONS 1-9: 0
SUM OF ALL RESPONSES TO PHQ9 QUESTIONS 1 & 2: 0
SUM OF ALL RESPONSES TO PHQ QUESTIONS 1-9: 0

## 2023-10-07 ASSESSMENT — LIFESTYLE VARIABLES
HOW MANY STANDARD DRINKS CONTAINING ALCOHOL DO YOU HAVE ON A TYPICAL DAY: 7 TO 9
HOW MANY STANDARD DRINKS CONTAINING ALCOHOL DO YOU HAVE ON A TYPICAL DAY: PATIENT DOES NOT DRINK
HOW OFTEN DO YOU HAVE A DRINK CONTAINING ALCOHOL: NEVER
HOW OFTEN DO YOU HAVE A DRINK CONTAINING ALCOHOL: 4 OR MORE TIMES A WEEK

## 2023-10-07 NOTE — ED PROVIDER NOTES
[facility] ED  eMERGENCY dEPARTMENT eNCOUnter      Pt Name: Rosann Kayser  MRN: [id number]  [address] [place] 1975  Date of evaluation: 10/7/2023  Provider: Zack Shepherd MD  Note Started: 10/7/23 7:48 PM EDT    HISTORY OF PRESENT ILLNESS      Chief Complaint   Patient presents with    Seizures     Pt mother told him he was having a seizure and states was told that he had 3 seizure  Pt does not recall any of this activity   Pt states that he has not been seen for this and has been occurring for last 2 years        The history is provided by the Patient. Rosann Kayser is a 41 y.o. male with a PMH clinically significant for  Substance induced mood disorder, HTN, Asthma, Anxiety, MDD, and Etoh Abuse presenting to the ED via PV c/o multiple seizures occurring today approximately 3. Patient states he was at home when all of this happened. Mom wanted him to be evaluated due to the multiple seizures. Patient states he does not remember any of the episodes. Did fall today after one of the episodes. Denies any pain however. Thinks he did hit his head, but denies any headache. States he last drank about 1 week ago. States he has had seizures both when he was drinking and when he was not drinking. Cannot member the last 1 exactly. Has had these issues over the past 2 years however. Has been shaky for a very long time, stating this is normal for him. No pain at this time. Denies any associated: F/C/D, HA, Numbness, Focal Weakness, Cough, SOB, CP, Abd pain, N/V/D/C, Dysuria, or Hematuria. Per Chart Review: PMH as noted above obtained via outpatient chart review. Multiple presentations for Etoh abuse appreciated.       REVIEW OF SYSTEMS       Review of Systems  Otherwise as noted in HPI    PAST MEDICAL HISTORY     Past Medical History:   Diagnosis Date    Arthritis     Asthma     Hypertension     Psychiatric problem        SURGICAL HISTORY       Past Surgical History:   Procedure Laterality HISTORY       Past Surgical History:   Procedure Laterality Date    TOE AMPUTATION Left     fifth toe       FAMILY HISTORY       Family History   Problem Relation Age of Onset    Mental Illness Mother        SOCIAL HISTORY       Social History     Socioeconomic History    Marital status:      Spouse name: None    Number of children: None    Years of education: None    Highest education level: None   Tobacco Use    Smoking status: Every Day     Packs/day: 1.50     Years: 25.00     Additional pack years: 0.00     Total pack years: 37.50     Types: Cigarettes    Smokeless tobacco: Former   Vaping Use    Vaping Use: Never used   Substance and Sexual Activity    Alcohol use: Yes     Comment: Daily    Drug use: Yes     Types: Marijuana (Weed)       CURRENT MEDICATIONS       Discharge Medication List as of 10/12/2023  7:40 PM        CONTINUE these medications which have NOT CHANGED    Details   divalproex (DEPAKOTE) 500 MG DR tablet Take 1 tablet by mouth every 12 hours, Disp-30 tablet, R-2Print      nicotine (NICODERM CQ) 14 MG/24HR Place 1 patch onto the skin daily, Disp-30 patch, R-1Print             ALLERGIES     Patient has no known allergies. PHYSICAL EXAM       ED Triage Vitals   BP Temp Temp src Pulse Resp SpO2 Height Weight   -- -- -- -- -- -- -- --       Physical Exam  Vitals and nursing note reviewed. Constitutional:       General: He is not in acute distress. Appearance: He is ill-appearing. He is not diaphoretic. HENT:      Head: Normocephalic and atraumatic. Mouth/Throat:      Mouth: Mucous membranes are dry. Pharynx: Oropharynx is clear. Eyes:      Conjunctiva/sclera: Conjunctivae normal.   Cardiovascular:      Rate and Rhythm: Regular rhythm. Tachycardia present. Pulses: Normal pulses. Pulmonary:      Effort: Pulmonary effort is normal. No respiratory distress. Abdominal:      Palpations: Abdomen is soft. Tenderness: There is no abdominal tenderness.  There is

## 2023-10-08 LAB
ALBUMIN SERPL-MCNC: 3.1 G/DL (ref 3.5–4.6)
ALBUMIN SERPL-MCNC: 3.5 G/DL (ref 3.5–4.6)
ALP SERPL-CCNC: 72 U/L (ref 35–104)
ALP SERPL-CCNC: 75 U/L (ref 35–104)
ALT SERPL-CCNC: 21 U/L (ref 0–41)
ALT SERPL-CCNC: 24 U/L (ref 0–41)
AMPHET UR QL SCN: NORMAL
ANION GAP SERPL CALCULATED.3IONS-SCNC: 13 MEQ/L (ref 9–15)
ANION GAP SERPL CALCULATED.3IONS-SCNC: 14 MEQ/L (ref 9–15)
ANION GAP SERPL CALCULATED.3IONS-SCNC: 16 MEQ/L (ref 9–15)
ANION GAP SERPL CALCULATED.3IONS-SCNC: 19 MEQ/L (ref 9–15)
AST SERPL-CCNC: 41 U/L (ref 0–40)
AST SERPL-CCNC: 42 U/L (ref 0–40)
BARBITURATES UR QL SCN: NORMAL
BASOPHILS # BLD: 0 K/UL (ref 0–0.2)
BASOPHILS NFR BLD: 0.1 %
BENZODIAZ UR QL SCN: NORMAL
BILIRUB SERPL-MCNC: 1.1 MG/DL (ref 0.2–0.7)
BILIRUB SERPL-MCNC: 1.1 MG/DL (ref 0.2–0.7)
BILIRUB UR QL STRIP: NEGATIVE
BUN SERPL-MCNC: 10 MG/DL (ref 6–20)
BUN SERPL-MCNC: 12 MG/DL (ref 6–20)
BUN SERPL-MCNC: 16 MG/DL (ref 6–20)
BUN SERPL-MCNC: 17 MG/DL (ref 6–20)
CALCIUM SERPL-MCNC: 6.8 MG/DL (ref 8.5–9.9)
CALCIUM SERPL-MCNC: 6.8 MG/DL (ref 8.5–9.9)
CALCIUM SERPL-MCNC: 6.9 MG/DL (ref 8.5–9.9)
CALCIUM SERPL-MCNC: 7.3 MG/DL (ref 8.5–9.9)
CANNABINOIDS UR QL SCN: NORMAL
CHLORIDE SERPL-SCNC: 71 MEQ/L (ref 95–107)
CHLORIDE SERPL-SCNC: 73 MEQ/L (ref 95–107)
CHLORIDE SERPL-SCNC: 81 MEQ/L (ref 95–107)
CHLORIDE SERPL-SCNC: 84 MEQ/L (ref 95–107)
CK SERPL-CCNC: 126 U/L (ref 0–190)
CLARITY UR: CLEAR
CO2 SERPL-SCNC: 32 MEQ/L (ref 20–31)
CO2 SERPL-SCNC: 33 MEQ/L (ref 20–31)
CO2 SERPL-SCNC: 35 MEQ/L (ref 20–31)
CO2 SERPL-SCNC: 40 MEQ/L (ref 20–31)
COCAINE UR QL SCN: NORMAL
COLOR UR: YELLOW
CREAT SERPL-MCNC: 0.64 MG/DL (ref 0.7–1.2)
CREAT SERPL-MCNC: 0.66 MG/DL (ref 0.7–1.2)
CREAT SERPL-MCNC: 1.23 MG/DL (ref 0.7–1.2)
CREAT SERPL-MCNC: 1.24 MG/DL (ref 0.7–1.2)
CREAT UR-MCNC: 122.3 MG/DL
CREAT UR-MCNC: 46.4 MG/DL
DRUG SCREEN COMMENT UR-IMP: NORMAL
EOSINOPHIL # BLD: 0 K/UL (ref 0–0.7)
EOSINOPHIL NFR BLD: 0.1 %
ERYTHROCYTE [DISTWIDTH] IN BLOOD BY AUTOMATED COUNT: 14.3 % (ref 11.5–14.5)
FENTANYL SCREEN, URINE: NORMAL
GLOBULIN SER CALC-MCNC: 2.4 G/DL (ref 2.3–3.5)
GLOBULIN SER CALC-MCNC: 2.5 G/DL (ref 2.3–3.5)
GLUCOSE BLD-MCNC: 100 MG/DL (ref 70–99)
GLUCOSE SERPL-MCNC: 70 MG/DL (ref 70–99)
GLUCOSE SERPL-MCNC: 74 MG/DL (ref 70–99)
GLUCOSE SERPL-MCNC: 84 MG/DL (ref 70–99)
GLUCOSE SERPL-MCNC: 89 MG/DL (ref 70–99)
GLUCOSE UR STRIP-MCNC: NEGATIVE MG/DL
HCT VFR BLD AUTO: 36.4 % (ref 42–52)
HGB BLD-MCNC: 12.8 G/DL (ref 14–18)
HGB UR QL STRIP: NEGATIVE
KETONES UR STRIP-MCNC: ABNORMAL MG/DL
LACTATE BLDV-SCNC: 1.2 MMOL/L (ref 0.5–2.2)
LEUKOCYTE ESTERASE UR QL STRIP: NEGATIVE
LYMPHOCYTES # BLD: 1 K/UL (ref 1–4.8)
LYMPHOCYTES NFR BLD: 10.9 %
MAGNESIUM SERPL-MCNC: 2.6 MG/DL (ref 1.7–2.4)
MAGNESIUM SERPL-MCNC: 2.8 MG/DL (ref 1.7–2.4)
MAGNESIUM SERPL-MCNC: 2.8 MG/DL (ref 1.7–2.4)
MAGNESIUM SERPL-MCNC: 3.3 MG/DL (ref 1.7–2.4)
MAGNESIUM URINE: 8.4 MG/DL
MCH RBC QN AUTO: 33.2 PG (ref 27–31.3)
MCHC RBC AUTO-ENTMCNC: 35.2 % (ref 33–37)
MCV RBC AUTO: 94.3 FL (ref 79–92.2)
METHADONE UR QL SCN: NORMAL
MICROALBUMIN UR-MCNC: <1.2 MG/DL
MICROALBUMIN/CREAT UR-RTO: NORMAL MG/G (ref 0–30)
MONOCYTES # BLD: 1.4 K/UL (ref 0.2–0.8)
MONOCYTES NFR BLD: 16.1 %
NEUTROPHILS # BLD: 6.4 K/UL (ref 1.4–6.5)
NEUTS SEG NFR BLD: 72.1 %
NITRITE UR QL STRIP: NEGATIVE
OPIATES UR QL SCN: NORMAL
OSMOLALITY URINE: 299 MOSM/KG (ref 80–1300)
OXYCODONE UR QL SCN: NORMAL
PCP UR QL SCN: NORMAL
PERFORMED ON: ABNORMAL
PH UR STRIP: 5.5 [PH] (ref 5–9)
PLATELET # BLD AUTO: 88 K/UL (ref 130–400)
PLATELET BLD QL SMEAR: ABNORMAL
POTASSIUM SERPL-SCNC: 2.1 MEQ/L (ref 3.4–4.9)
POTASSIUM SERPL-SCNC: 2.2 MEQ/L (ref 3.4–4.9)
POTASSIUM SERPL-SCNC: 2.8 MEQ/L (ref 3.4–4.9)
POTASSIUM SERPL-SCNC: 2.9 MEQ/L (ref 3.4–4.9)
POTASSIUM UR-SCNC: 31.9 MEQ/L
PROPOXYPH UR QL SCN: NORMAL
PROT SERPL-MCNC: 5.5 G/DL (ref 6.3–8)
PROT SERPL-MCNC: 6 G/DL (ref 6.3–8)
PROT UR STRIP-MCNC: NEGATIVE MG/DL
RBC # BLD AUTO: 3.86 M/UL (ref 4.7–6.1)
SERUM OSMOLALITY: 268 MOSM/KG (ref 275–295)
SODIUM SERPL-SCNC: 124 MEQ/L (ref 135–144)
SODIUM SERPL-SCNC: 124 MEQ/L (ref 135–144)
SODIUM SERPL-SCNC: 130 MEQ/L (ref 135–144)
SODIUM SERPL-SCNC: 133 MEQ/L (ref 135–144)
SODIUM UR-SCNC: 34 MEQ/L
SP GR UR STRIP: 1.01 (ref 1–1.03)
URIC ACID, UR: 17.6 MG/DL
URINE REFLEX TO CULTURE: ABNORMAL
UROBILINOGEN UR STRIP-ACNC: 1 E.U./DL
WBC # BLD AUTO: 8.9 K/UL (ref 4.8–10.8)

## 2023-10-08 PROCEDURE — 84300 ASSAY OF URINE SODIUM: CPT

## 2023-10-08 PROCEDURE — 80307 DRUG TEST PRSMV CHEM ANLYZR: CPT

## 2023-10-08 PROCEDURE — 82043 UR ALBUMIN QUANTITATIVE: CPT

## 2023-10-08 PROCEDURE — 82570 ASSAY OF URINE CREATININE: CPT

## 2023-10-08 PROCEDURE — 80053 COMPREHEN METABOLIC PANEL: CPT

## 2023-10-08 PROCEDURE — 51798 US URINE CAPACITY MEASURE: CPT

## 2023-10-08 PROCEDURE — 2580000003 HC RX 258: Performed by: NURSE PRACTITIONER

## 2023-10-08 PROCEDURE — 81003 URINALYSIS AUTO W/O SCOPE: CPT

## 2023-10-08 PROCEDURE — 83930 ASSAY OF BLOOD OSMOLALITY: CPT

## 2023-10-08 PROCEDURE — 2000000000 HC ICU R&B

## 2023-10-08 PROCEDURE — 82550 ASSAY OF CK (CPK): CPT

## 2023-10-08 PROCEDURE — 99291 CRITICAL CARE FIRST HOUR: CPT | Performed by: INTERNAL MEDICINE

## 2023-10-08 PROCEDURE — 84560 ASSAY OF URINE/URIC ACID: CPT

## 2023-10-08 PROCEDURE — A4216 STERILE WATER/SALINE, 10 ML: HCPCS | Performed by: INTERNAL MEDICINE

## 2023-10-08 PROCEDURE — 2580000003 HC RX 258: Performed by: STUDENT IN AN ORGANIZED HEALTH CARE EDUCATION/TRAINING PROGRAM

## 2023-10-08 PROCEDURE — 6360000002 HC RX W HCPCS: Performed by: NURSE PRACTITIONER

## 2023-10-08 PROCEDURE — 6360000002 HC RX W HCPCS: Performed by: STUDENT IN AN ORGANIZED HEALTH CARE EDUCATION/TRAINING PROGRAM

## 2023-10-08 PROCEDURE — 83735 ASSAY OF MAGNESIUM: CPT

## 2023-10-08 PROCEDURE — 2580000003 HC RX 258: Performed by: INTERNAL MEDICINE

## 2023-10-08 PROCEDURE — 2500000003 HC RX 250 WO HCPCS: Performed by: INTERNAL MEDICINE

## 2023-10-08 PROCEDURE — 6370000000 HC RX 637 (ALT 250 FOR IP): Performed by: STUDENT IN AN ORGANIZED HEALTH CARE EDUCATION/TRAINING PROGRAM

## 2023-10-08 PROCEDURE — 83605 ASSAY OF LACTIC ACID: CPT

## 2023-10-08 PROCEDURE — 84133 ASSAY OF URINE POTASSIUM: CPT

## 2023-10-08 PROCEDURE — 36415 COLL VENOUS BLD VENIPUNCTURE: CPT

## 2023-10-08 PROCEDURE — 6360000002 HC RX W HCPCS: Performed by: INTERNAL MEDICINE

## 2023-10-08 PROCEDURE — 85025 COMPLETE CBC W/AUTO DIFF WBC: CPT

## 2023-10-08 PROCEDURE — 2500000003 HC RX 250 WO HCPCS: Performed by: NURSE PRACTITIONER

## 2023-10-08 PROCEDURE — 83935 ASSAY OF URINE OSMOLALITY: CPT

## 2023-10-08 PROCEDURE — 51701 INSERT BLADDER CATHETER: CPT

## 2023-10-08 RX ORDER — NICOTINE 21 MG/24HR
1 PATCH, TRANSDERMAL 24 HOURS TRANSDERMAL DAILY
Status: DISCONTINUED | OUTPATIENT
Start: 2023-10-09 | End: 2023-10-09

## 2023-10-08 RX ORDER — ONDANSETRON 4 MG/1
4 TABLET, ORALLY DISINTEGRATING ORAL EVERY 8 HOURS PRN
Status: DISCONTINUED | OUTPATIENT
Start: 2023-10-08 | End: 2023-10-12 | Stop reason: HOSPADM

## 2023-10-08 RX ORDER — LABETALOL HYDROCHLORIDE 5 MG/ML
10 INJECTION, SOLUTION INTRAVENOUS EVERY 4 HOURS PRN
Status: DISCONTINUED | OUTPATIENT
Start: 2023-10-08 | End: 2023-10-12 | Stop reason: HOSPADM

## 2023-10-08 RX ORDER — POTASSIUM CHLORIDE 7.45 MG/ML
10 INJECTION INTRAVENOUS
Status: COMPLETED | OUTPATIENT
Start: 2023-10-08 | End: 2023-10-08

## 2023-10-08 RX ORDER — POLYETHYLENE GLYCOL 3350 17 G/17G
17 POWDER, FOR SOLUTION ORAL DAILY PRN
Status: DISCONTINUED | OUTPATIENT
Start: 2023-10-08 | End: 2023-10-12 | Stop reason: HOSPADM

## 2023-10-08 RX ORDER — SODIUM CHLORIDE 0.9 % (FLUSH) 0.9 %
5-40 SYRINGE (ML) INJECTION EVERY 12 HOURS SCHEDULED
Status: DISCONTINUED | OUTPATIENT
Start: 2023-10-08 | End: 2023-10-12 | Stop reason: HOSPADM

## 2023-10-08 RX ORDER — SODIUM CHLORIDE 9 MG/ML
INJECTION, SOLUTION INTRAVENOUS PRN
Status: DISCONTINUED | OUTPATIENT
Start: 2023-10-08 | End: 2023-10-12 | Stop reason: HOSPADM

## 2023-10-08 RX ORDER — SODIUM CHLORIDE 9 MG/ML
INJECTION, SOLUTION INTRAVENOUS CONTINUOUS
Status: DISCONTINUED | OUTPATIENT
Start: 2023-10-08 | End: 2023-10-12

## 2023-10-08 RX ORDER — THIAMINE HYDROCHLORIDE 100 MG/ML
100 INJECTION, SOLUTION INTRAMUSCULAR; INTRAVENOUS DAILY
Status: DISCONTINUED | OUTPATIENT
Start: 2023-10-08 | End: 2023-10-12 | Stop reason: HOSPADM

## 2023-10-08 RX ORDER — ACETAMINOPHEN 325 MG/1
650 TABLET ORAL EVERY 6 HOURS PRN
Status: DISCONTINUED | OUTPATIENT
Start: 2023-10-08 | End: 2023-10-12 | Stop reason: HOSPADM

## 2023-10-08 RX ORDER — ONDANSETRON 2 MG/ML
4 INJECTION INTRAMUSCULAR; INTRAVENOUS EVERY 6 HOURS PRN
Status: DISCONTINUED | OUTPATIENT
Start: 2023-10-08 | End: 2023-10-12 | Stop reason: HOSPADM

## 2023-10-08 RX ORDER — ENOXAPARIN SODIUM 100 MG/ML
40 INJECTION SUBCUTANEOUS DAILY
Status: DISCONTINUED | OUTPATIENT
Start: 2023-10-08 | End: 2023-10-12 | Stop reason: HOSPADM

## 2023-10-08 RX ORDER — SODIUM CHLORIDE 0.9 % (FLUSH) 0.9 %
5-40 SYRINGE (ML) INJECTION PRN
Status: DISCONTINUED | OUTPATIENT
Start: 2023-10-08 | End: 2023-10-12 | Stop reason: HOSPADM

## 2023-10-08 RX ORDER — ACETAMINOPHEN 650 MG/1
650 SUPPOSITORY RECTAL EVERY 6 HOURS PRN
Status: DISCONTINUED | OUTPATIENT
Start: 2023-10-08 | End: 2023-10-12 | Stop reason: HOSPADM

## 2023-10-08 RX ADMIN — POTASSIUM CHLORIDE 10 MEQ: 7.46 INJECTION, SOLUTION INTRAVENOUS at 07:43

## 2023-10-08 RX ADMIN — POTASSIUM CHLORIDE 10 MEQ: 7.46 INJECTION, SOLUTION INTRAVENOUS at 11:14

## 2023-10-08 RX ADMIN — POTASSIUM CHLORIDE 10 MEQ: 10 INJECTION, SOLUTION INTRAVENOUS at 02:15

## 2023-10-08 RX ADMIN — SODIUM CHLORIDE: 9 INJECTION, SOLUTION INTRAVENOUS at 23:32

## 2023-10-08 RX ADMIN — SODIUM CHLORIDE: 9 INJECTION, SOLUTION INTRAVENOUS at 12:17

## 2023-10-08 RX ADMIN — SODIUM CHLORIDE, PRESERVATIVE FREE 20 MG: 5 INJECTION INTRAVENOUS at 21:29

## 2023-10-08 RX ADMIN — FOLIC ACID 1 MG: 5 INJECTION, SOLUTION INTRAMUSCULAR; INTRAVENOUS; SUBCUTANEOUS at 08:28

## 2023-10-08 RX ADMIN — LORAZEPAM 2 MG: 2 INJECTION INTRAMUSCULAR; INTRAVENOUS at 07:48

## 2023-10-08 RX ADMIN — POTASSIUM CHLORIDE 10 MEQ: 7.46 INJECTION, SOLUTION INTRAVENOUS at 13:21

## 2023-10-08 RX ADMIN — Medication 5 ML: at 07:49

## 2023-10-08 RX ADMIN — POTASSIUM CHLORIDE 10 MEQ: 7.46 INJECTION, SOLUTION INTRAVENOUS at 12:18

## 2023-10-08 RX ADMIN — Medication 10 ML: at 21:49

## 2023-10-08 RX ADMIN — LORAZEPAM 2 MG: 2 INJECTION INTRAMUSCULAR; INTRAVENOUS at 06:45

## 2023-10-08 RX ADMIN — POTASSIUM CHLORIDE 10 MEQ: 7.46 INJECTION, SOLUTION INTRAVENOUS at 08:57

## 2023-10-08 RX ADMIN — POTASSIUM CHLORIDE 10 MEQ: 7.46 INJECTION, SOLUTION INTRAVENOUS at 14:34

## 2023-10-08 RX ADMIN — POTASSIUM BICARBONATE 50 MEQ: 978 TABLET, EFFERVESCENT ORAL at 21:30

## 2023-10-08 RX ADMIN — THIAMINE HYDROCHLORIDE 100 MG: 100 INJECTION, SOLUTION INTRAMUSCULAR; INTRAVENOUS at 02:13

## 2023-10-08 RX ADMIN — LORAZEPAM 2 MG: 2 INJECTION INTRAMUSCULAR; INTRAVENOUS at 00:27

## 2023-10-08 RX ADMIN — LORAZEPAM 2 MG: 2 INJECTION INTRAMUSCULAR; INTRAVENOUS at 04:16

## 2023-10-08 RX ADMIN — POTASSIUM BICARBONATE 50 MEQ: 978 TABLET, EFFERVESCENT ORAL at 22:57

## 2023-10-08 RX ADMIN — LORAZEPAM 2 MG: 2 INJECTION INTRAMUSCULAR; INTRAVENOUS at 01:50

## 2023-10-08 RX ADMIN — POTASSIUM CHLORIDE 10 MEQ: 10 INJECTION, SOLUTION INTRAVENOUS at 05:08

## 2023-10-08 RX ADMIN — POTASSIUM CHLORIDE 10 MEQ: 10 INJECTION, SOLUTION INTRAVENOUS at 01:19

## 2023-10-08 RX ADMIN — SODIUM CHLORIDE, PRESERVATIVE FREE 5 ML: 5 INJECTION INTRAVENOUS at 07:48

## 2023-10-08 RX ADMIN — POTASSIUM CHLORIDE 10 MEQ: 7.46 INJECTION, SOLUTION INTRAVENOUS at 09:59

## 2023-10-08 RX ADMIN — SODIUM CHLORIDE: 9 INJECTION, SOLUTION INTRAVENOUS at 02:04

## 2023-10-08 RX ADMIN — POTASSIUM CHLORIDE 10 MEQ: 10 INJECTION, SOLUTION INTRAVENOUS at 03:32

## 2023-10-08 RX ADMIN — Medication 10 ML: at 05:09

## 2023-10-08 RX ADMIN — SODIUM CHLORIDE, PRESERVATIVE FREE 20 MG: 5 INJECTION INTRAVENOUS at 11:14

## 2023-10-08 RX ADMIN — POTASSIUM CHLORIDE 10 MEQ: 10 INJECTION, SOLUTION INTRAVENOUS at 00:13

## 2023-10-08 ASSESSMENT — PAIN SCALES - WONG BAKER
WONGBAKER_NUMERICALRESPONSE: 2
WONGBAKER_NUMERICALRESPONSE: 0
WONGBAKER_NUMERICALRESPONSE: 0
WONGBAKER_NUMERICALRESPONSE: 2
WONGBAKER_NUMERICALRESPONSE: 0
WONGBAKER_NUMERICALRESPONSE: 2
WONGBAKER_NUMERICALRESPONSE: 0
WONGBAKER_NUMERICALRESPONSE: 2

## 2023-10-08 ASSESSMENT — PAIN DESCRIPTION - ONSET: ONSET: UNABLE TO COMMUNICATE

## 2023-10-08 NOTE — PROGRESS NOTES
Patient arrived in the ICU around 0100. Dr. Nimisha Bautista and Bolzan-Roche APRN-CNP visited the patient at bedside. New orders, see orders for details. 0150 Patient given 2g Ativan per CIWA, see order MAR for details. Care on-going.

## 2023-10-08 NOTE — CONSULTS
Renal consult dictated   Hyponatremia  Hypokalemia  Azotemia resolved  Alcoholic  Cachexia    Plan Continue KCl infusion  watch K thru day  Mg++ has been replaced  maintain hydration monitor I&O

## 2023-10-08 NOTE — ED NOTES
Pt friend at bedside states that pt stopped drinking a week or less ago  Pt states \"I don't remember when\" referring to drinking   Pt has tremors at this time   Pt drinks a \"handle\" of vodka almost every day per pt     Rhiannon Latham RN  10/07/23 2015

## 2023-10-08 NOTE — CONSULTS
Pulmonary and Critical Care Medicine  Consult Note  Encounter Date: 10/8/2023 10:14 AM    Mr. Bob Almeida is a 41 y.o. male  : 1975  Requesting Provider: Ace Le MD    Reason for request: DT's, ICU care          HISTORY OF PRESENT ILLNESS:    Patient is 41 y.o. presents to the emergency department yesterday with reports of seizure. All history is obtained from medical records as patient is not able to provide any additional history at this time. He did receive several doses Ativan since this morning secondary to elevated CIWA score. Per the history and physical the patient did have witnessed seizure at home. He does have a known history of seizure disorder. His last alcoholic drink was approximately 1 week ago. Past Medical History:        Diagnosis Date    Arthritis     Asthma     Hypertension     Psychiatric problem        Past Surgical History:        Procedure Laterality Date    TOE AMPUTATION Left     fifth toe       Social History:     reports that he has been smoking cigarettes. He has a 37.50 pack-year smoking history. He has quit using smokeless tobacco. He reports current alcohol use. He reports current drug use. Drug: Marijuana Graciella Muzzy). Family History:       Problem Relation Age of Onset    Mental Illness Mother        Allergies:  Patient has no known allergies.         MEDICATIONS during current hospitalization:    Continuous Infusions:   sodium chloride      sodium chloride 100 mL/hr at 10/08/23 0519    sodium chloride         Scheduled Meds:   sodium chloride flush  5-40 mL IntraVENous 2 times per day    enoxaparin  40 mg SubCUTAneous Daily    thiamine  100 mg IntraVENous Daily    folic acid 1 mg in sodium chloride 0.9 % 50 mL IVPB  1 mg IntraVENous Daily    potassium chloride  10 mEq IntraVENous Q1H    sodium chloride flush  5-40 mL IntraVENous 2 times per day       PRN Meds:sodium chloride flush, sodium chloride, ondansetron **OR** ondansetron, polyethylene glycol, normal in appearance. The osseous structures are normal in appearance for the patient's age. Normal portable chest       Assessment/Plan:     Seizure--patient did reportedly have several seizures at home. He does also reportedly have a history of seizure disorder. His last drink was reportedly 1 week ago. At this time he has not had any further witnessed seizure activity since being admitted to the hospital.  He does continue to receive Ativan per CIWA. He does continue with seizure activity he may benefit from a neurology consultation. Alcohol withdrawal/DTs--patient reportedly had alcohol last 1 week ago. His alcohol level is negative when he came into the hospital.  He has received 6 mg of Ativan IV since 4 AM, with his last dose being around 730. He will continue with Ativan per CIWA protocol. At this time he does not require anything continuous for DTs. Electrolyte imbalance/HUMZA--nephrology has been consulted. He is currently receiving magnesium given his low magnesium level. He is also receiving potassium replacement. He does continue on maintenance IV fluids as well. Nutrition: Oral diet as tolerated    Prophylaxis: Lovenox for DVT prophylaxis. He will be started on Pepcid for GI prophylaxis until he is tolerating enteral intake. Code Status: Full code    This is a 41 y.o. critically ill male from seizure and DTs. I did spend a total of 38 minutes of critical care time with the patient, and review of the chart, and discussion with the bedside staff. This time is exclusive of any billable procedures.     Thank you for consultation    Electronically signed by Ginny Aguilar DO, on 10/8/2023 at 10:14 AM

## 2023-10-08 NOTE — ACP (ADVANCE CARE PLANNING)
Advance Care Planning   Healthcare Decision Maker:    Primary Decision Maker: Renee Mark [facility] [phone]    Click here to complete Healthcare Decision Makers including selection of the Healthcare Decision Maker Relationship (ie \"Primary\"). Today we documented Decision Maker(s) consistent with Legal Next of Kin hierarchy.        If the relationship to the patient does NOT follow our state's Next of Kin hierarchy, the patient MUST complete an ACP Document to allow him/her to act on the patient's behalf. :      Electronically signed by SLIME Estrada on 10/8/2023 at 9:18 AM

## 2023-10-08 NOTE — H&P
[facility]    HISTORY AND PHYSICAL EXAM    PATIENT NAME:  Austen Lake    MRN:  [id number]  SERVICE DATE:  10/8/2023   SERVICE TIME:  12:35 AM    Primary Care Physician: Enzo Richardson  CHIEF COMPLAINT:  Seizures        HPI: Patient being admitted for seizures and alcohol withdrawal.  Patient is unable to provide history due to mental status change. Patient currently withdraws to pain and will obey simple commands but has garbled speech and does not provide any information. Patient has tremors but it was reported by family to ER staff that his tremors are normal.  Patient was brought into the ED today by EMS after he had multiple seizures. Patient initially was able to give some information to the ED provider and reported that he had not had alcoholic drink in about a week. Patient also has a history of seizures. Patient's past medical history includes EtOH abuse, seizures, HTN, psychosis, and depression.     PAST MEDICAL HISTORY:    Past Medical History:   Diagnosis Date    Arthritis     Asthma     Hypertension     Psychiatric problem      PAST SURGICAL HISTORY:    Past Surgical History:   Procedure Laterality Date    TOE AMPUTATION Left     fifth toe     FAMILY HISTORY:    Family History   Problem Relation Age of Onset    Mental Illness Mother      SOCIAL HISTORY:    Social History     Socioeconomic History    Marital status:      Spouse name: Not on file    Number of children: Not on file    Years of education: Not on file    Highest education level: Not on file   Occupational History    Not on file   Tobacco Use    Smoking status: Every Day     Packs/day: 1.50     Years: 25.00     Additional pack years: 0.00     Total pack years: 37.50     Types: Cigarettes    Smokeless tobacco: Former   Vaping Use    Vaping Use: Never used   Substance and Sexual Activity    Alcohol use: Yes     Comment: Daily    Drug use: Yes     Types: Marijuana (Parmjit Serrano)

## 2023-10-08 NOTE — PLAN OF CARE
Problem: Safety - Medical Restraint  Goal: Remains free of injury from restraints (Restraint for Interference with Medical Device)  Description: INTERVENTIONS:  1. Determine that other, less restrictive measures have been tried or would not be effective before applying the restraint  2. Evaluate the patient's condition at the time of restraint application  3. Inform patient/family regarding the reason for restraint  4. Q2H: Monitor safety, psychosocial status, comfort, nutrition and hydration  Outcome: Progressing    Problem: Discharge Planning  Goal: Discharge to home or other facility with appropriate resources  Outcome: Progressing  Discharge to home or other facility with appropriate resources:   Identify barriers to discharge with patient and caregiver   Arrange for needed discharge resources and transportation as appropriate   Identify discharge learning needs (meds, wound care, etc)   Refer to discharge planning if patient needs post-hospital services based on physician order or complex needs related to functional status, cognitive ability or social support system    Problem: Confusion  Goal: Confusion, delirium, dementia, or psychosis is improved or at baseline  Description: INTERVENTIONS:  1. Assess for possible contributors to thought disturbance, including medications, impaired vision or hearing, underlying metabolic abnormalities, dehydration, psychiatric diagnoses, and notify attending LIP  2. [place] high risk fall precautions, as indicated  3. Provide frequent short contacts to provide reality reorientation, refocusing and direction  4. Decrease environmental stimuli, including noise as appropriate  5. Monitor and intervene to maintain adequate nutrition, hydration, elimination, sleep and activity  6. If unable to ensure safety without constant attention obtain sitter and review sitter guidelines with assigned personnel  7.  Initiate Psychosocial CNS and Spiritual Care consult, as

## 2023-10-08 NOTE — CARE COORDINATION
Case Management Assessment  Initial Evaluation    Date/Time of Evaluation: 10/8/2023 9:15 AM  Assessment Completed by: SLIME Mitchell    If patient is discharged prior to next notation, then this note serves as note for discharge by case management. Patient Name: Bob Almeida                   YOB: 1975  Diagnosis: Seizures ([address]) [R56.9]  DTs (delirium tremens) ([address]) [F10.931]  Alcohol withdrawal syndrome, with delirium [facility]) [F10.931]                   Date / Time: 10/7/2023  7:44 PM    Patient Admission Status: Inpatient   Readmission Risk (Low < 19, Mod (19-27), High > 27): No data recorded  Current PCP: Phuc  PCP verified by CM? Yes    Chart Reviewed: Yes      History Provided by: Child/Family  Patient Orientation: Unable to Assess, Other (see comment) (CONFUSE; CWAI)    Patient Cognition: Other (see comment) (CONFUSE)    Hospitalization in the last 30 days (Readmission):  No    If yes, Readmission Assessment in  Navigator will be completed. Advance Directives:      Code Status: Full Code   Patient's Primary Decision Maker is: Legal Next of Kin      Discharge Planning:    Patient lives with:   Type of Home:    Primary Care Giver: Self  Patient Support Systems include: Parent   Current Financial resources: Medicaid  Current community resources: None  Current services prior to admission:              Current DME:              Type of Home Care services:       ADLS  Prior functional level: Independent in ADLs/IADLs  Current functional level: Other (see comment) (CWAI)    PT AM-PAC:   /24  OT AM-PAC:   /24    Family can provide assistance at DC: No  Would you like Case Management to discuss the discharge plan with any other family members/significant others, and if so, who?  Yes (MOM)  Plans to Return to Present Housing: Unknown at present  Other Identified Issues/Barriers to RETURNING to current housing: MEDICAL CLEARANCE  Potential Assistance needed at

## 2023-10-08 NOTE — CONSULTS
[facility], [address] [facility]                                  CONSULTATION    PATIENT NAME: Jennyfer Sanchez                     :        1975  MED REC NO:   [id number]                            ROOM:       [facility]  ACCOUNT NO:   [de-identified]                           ADMIT DATE: 10/07/2023  PROVIDER:     Nazario Bishop DO    CONSULT DATE:  10/08/2023    RENAL CONSULTATION    HISTORY OF PRESENT ILLNESS:  A 63-year-old unkempt cachectic-appearing  male being seen for hypokalemia and azotemia. The patient evidently had  a seizure activity after alcohol withdrawal.  The patient has been using  a liter of vodka a day for weeks to months. The patient is sedated at  this time and unable to give history. Evidently, he had seizures on the  way to the hospital in the emergency room. On admission, the patient  had mild hyponatremia of 124, potassium of 2.5 mEq, creatinine of 2 and  GFR of 54 mL a minute. The patient is noted to have an elevated enzymes  and metabolic acidosis, most likely related to his alcohol. The  patient's nutritional level is poor with albumin of 3.1. Salicylate  levels were normal on admission. The patient on the day of evaluation  of potassium 2.2 mEq, being given continuous IV supplements with KCl. His serum sodium remains at 124, however, there has been a passive  improvement in his creatinine to 1.23 and a GFR of greater than 60. Unable to get any type of history for the patient being sedated. PAST MEDICAL HISTORY:  Asthma, hypertension, psychiatric disorder,  alcoholic. PAST SURGICAL HISTORY:  Fifth left toe amputation. SOCIAL HISTORY/HABITS:  . Smokes on a daily basis, as well as  uses marijuana and alcohol. MEDICATIONS:  At the time of this admission; Depakote, Desyrel,  Lopressor, and Zoloft. ALLERGIES TO MEDICATIONS:  None.     PHYSICAL EXAMINATION:  VITAL SIGNS:  Height 5 feet and

## 2023-10-08 NOTE — PROGRESS NOTES
1800- patient now awake with eyes open, oriented x3. Disoriented to situation. Patient states he wants to leave AMA, this RN educated patient on the risks of leaving, his o2 requirements, delirium tremens, santos, lab results, etc. he states he is agreeable to stay over night to see what the doctors have to say in the morning. Passed bedside swallow eval/6pm K level 2.9- Dr. Soni Cotton notified. New diet order and potassium order received. 1845- restraints removed as patient remains calm and able to follow commands, patient educated on importance of not pulling at his lines. Patient verbalizes understanding. 1:1 sitter remains in place.

## 2023-10-09 LAB
ALBUMIN SERPL-MCNC: 3.5 G/DL (ref 3.5–4.6)
ALP SERPL-CCNC: 78 U/L (ref 35–104)
ALT SERPL-CCNC: 33 U/L (ref 0–41)
ANION GAP SERPL CALCULATED.3IONS-SCNC: 10 MEQ/L (ref 9–15)
ANION GAP SERPL CALCULATED.3IONS-SCNC: 11 MEQ/L (ref 9–15)
ANION GAP SERPL CALCULATED.3IONS-SCNC: 9 MEQ/L (ref 9–15)
AST SERPL-CCNC: 58 U/L (ref 0–40)
BASOPHILS # BLD: 0 K/UL (ref 0–0.2)
BASOPHILS NFR BLD: 0.2 %
BILIRUB SERPL-MCNC: 1 MG/DL (ref 0.2–0.7)
BUN SERPL-MCNC: 7 MG/DL (ref 6–20)
CALCIUM SERPL-MCNC: 7.7 MG/DL (ref 8.5–9.9)
CALCIUM SERPL-MCNC: 7.9 MG/DL (ref 8.5–9.9)
CALCIUM SERPL-MCNC: 8.3 MG/DL (ref 8.5–9.9)
CHLORIDE SERPL-SCNC: 100 MEQ/L (ref 95–107)
CHLORIDE SERPL-SCNC: 88 MEQ/L (ref 95–107)
CHLORIDE SERPL-SCNC: 92 MEQ/L (ref 95–107)
CO2 SERPL-SCNC: 29 MEQ/L (ref 20–31)
CO2 SERPL-SCNC: 30 MEQ/L (ref 20–31)
CO2 SERPL-SCNC: 34 MEQ/L (ref 20–31)
CREAT SERPL-MCNC: 0.5 MG/DL (ref 0.7–1.2)
CREAT SERPL-MCNC: 0.63 MG/DL (ref 0.7–1.2)
CREAT SERPL-MCNC: 0.64 MG/DL (ref 0.7–1.2)
EOSINOPHIL # BLD: 0 K/UL (ref 0–0.7)
EOSINOPHIL NFR BLD: 0.2 %
ERYTHROCYTE [DISTWIDTH] IN BLOOD BY AUTOMATED COUNT: 14.5 % (ref 11.5–14.5)
GLOBULIN SER CALC-MCNC: 2.5 G/DL (ref 2.3–3.5)
GLUCOSE SERPL-MCNC: 102 MG/DL (ref 70–99)
GLUCOSE SERPL-MCNC: 111 MG/DL (ref 70–99)
GLUCOSE SERPL-MCNC: 99 MG/DL (ref 70–99)
HCT VFR BLD AUTO: 39 % (ref 42–52)
HGB BLD-MCNC: 13.3 G/DL (ref 14–18)
LYMPHOCYTES # BLD: 0.8 K/UL (ref 1–4.8)
LYMPHOCYTES NFR BLD: 9.1 %
MAGNESIUM SERPL-MCNC: 2 MG/DL (ref 1.7–2.4)
MAGNESIUM SERPL-MCNC: 2 MG/DL (ref 1.7–2.4)
MAGNESIUM SERPL-MCNC: 2.3 MG/DL (ref 1.7–2.4)
MCH RBC QN AUTO: 33.1 PG (ref 27–31.3)
MCHC RBC AUTO-ENTMCNC: 34.1 % (ref 33–37)
MCV RBC AUTO: 97 FL (ref 79–92.2)
MONOCYTES # BLD: 1.1 K/UL (ref 0.2–0.8)
MONOCYTES NFR BLD: 12.6 %
NEUTROPHILS # BLD: 7 K/UL (ref 1.4–6.5)
NEUTS SEG NFR BLD: 77.5 %
PLATELET # BLD AUTO: 120 K/UL (ref 130–400)
POTASSIUM SERPL-SCNC: 3.2 MEQ/L (ref 3.4–4.9)
POTASSIUM SERPL-SCNC: 3.3 MEQ/L (ref 3.4–4.9)
POTASSIUM SERPL-SCNC: 3.3 MEQ/L (ref 3.4–4.9)
PROT SERPL-MCNC: 6 G/DL (ref 6.3–8)
RBC # BLD AUTO: 4.02 M/UL (ref 4.7–6.1)
REASON FOR REJECTION: NORMAL
REJECTED TEST: NORMAL
SODIUM SERPL-SCNC: 131 MEQ/L (ref 135–144)
SODIUM SERPL-SCNC: 133 MEQ/L (ref 135–144)
SODIUM SERPL-SCNC: 139 MEQ/L (ref 135–144)
WBC # BLD AUTO: 9.1 K/UL (ref 4.8–10.8)

## 2023-10-09 PROCEDURE — 2500000003 HC RX 250 WO HCPCS: Performed by: NURSE PRACTITIONER

## 2023-10-09 PROCEDURE — 6360000002 HC RX W HCPCS: Performed by: INTERNAL MEDICINE

## 2023-10-09 PROCEDURE — 83735 ASSAY OF MAGNESIUM: CPT

## 2023-10-09 PROCEDURE — 2700000000 HC OXYGEN THERAPY PER DAY

## 2023-10-09 PROCEDURE — 6370000000 HC RX 637 (ALT 250 FOR IP): Performed by: INTERNAL MEDICINE

## 2023-10-09 PROCEDURE — 2580000003 HC RX 258: Performed by: INTERNAL MEDICINE

## 2023-10-09 PROCEDURE — 2580000003 HC RX 258: Performed by: NURSE PRACTITIONER

## 2023-10-09 PROCEDURE — 2000000000 HC ICU R&B

## 2023-10-09 PROCEDURE — 2500000003 HC RX 250 WO HCPCS: Performed by: INTERNAL MEDICINE

## 2023-10-09 PROCEDURE — 99291 CRITICAL CARE FIRST HOUR: CPT | Performed by: INTERNAL MEDICINE

## 2023-10-09 PROCEDURE — 6370000000 HC RX 637 (ALT 250 FOR IP): Performed by: STUDENT IN AN ORGANIZED HEALTH CARE EDUCATION/TRAINING PROGRAM

## 2023-10-09 PROCEDURE — 80053 COMPREHEN METABOLIC PANEL: CPT

## 2023-10-09 PROCEDURE — 2580000003 HC RX 258

## 2023-10-09 PROCEDURE — 6360000002 HC RX W HCPCS: Performed by: NURSE PRACTITIONER

## 2023-10-09 PROCEDURE — 85025 COMPLETE CBC W/AUTO DIFF WBC: CPT

## 2023-10-09 PROCEDURE — A4216 STERILE WATER/SALINE, 10 ML: HCPCS | Performed by: INTERNAL MEDICINE

## 2023-10-09 PROCEDURE — 6360000002 HC RX W HCPCS: Performed by: STUDENT IN AN ORGANIZED HEALTH CARE EDUCATION/TRAINING PROGRAM

## 2023-10-09 PROCEDURE — 36415 COLL VENOUS BLD VENIPUNCTURE: CPT

## 2023-10-09 RX ORDER — NICOTINE 21 MG/24HR
1 PATCH, TRANSDERMAL 24 HOURS TRANSDERMAL DAILY
Status: DISCONTINUED | OUTPATIENT
Start: 2023-10-09 | End: 2023-10-12 | Stop reason: HOSPADM

## 2023-10-09 RX ORDER — HYDRALAZINE HYDROCHLORIDE 20 MG/ML
10 INJECTION INTRAMUSCULAR; INTRAVENOUS EVERY 4 HOURS PRN
Status: DISCONTINUED | OUTPATIENT
Start: 2023-10-09 | End: 2023-10-12 | Stop reason: HOSPADM

## 2023-10-09 RX ORDER — WATER 1000 ML/1000ML
INJECTION, SOLUTION INTRAVENOUS
Status: COMPLETED
Start: 2023-10-09 | End: 2023-10-09

## 2023-10-09 RX ORDER — ZIPRASIDONE MESYLATE 20 MG/ML
20 INJECTION, POWDER, LYOPHILIZED, FOR SOLUTION INTRAMUSCULAR ONCE
Status: COMPLETED | OUTPATIENT
Start: 2023-10-09 | End: 2023-10-09

## 2023-10-09 RX ORDER — POTASSIUM CHLORIDE 7.45 MG/ML
10 INJECTION INTRAVENOUS
Status: COMPLETED | OUTPATIENT
Start: 2023-10-09 | End: 2023-10-09

## 2023-10-09 RX ADMIN — LORAZEPAM 2 MG: 1 TABLET ORAL at 12:29

## 2023-10-09 RX ADMIN — THIAMINE HYDROCHLORIDE 100 MG: 100 INJECTION, SOLUTION INTRAMUSCULAR; INTRAVENOUS at 08:24

## 2023-10-09 RX ADMIN — ZIPRASIDONE MESYLATE 20 MG: 20 INJECTION, POWDER, LYOPHILIZED, FOR SOLUTION INTRAMUSCULAR at 15:10

## 2023-10-09 RX ADMIN — SODIUM CHLORIDE, PRESERVATIVE FREE 20 MG: 5 INJECTION INTRAVENOUS at 21:55

## 2023-10-09 RX ADMIN — LABETALOL HYDROCHLORIDE 10 MG: 5 INJECTION, SOLUTION INTRAVENOUS at 11:14

## 2023-10-09 RX ADMIN — FOLIC ACID 1 MG: 5 INJECTION, SOLUTION INTRAMUSCULAR; INTRAVENOUS; SUBCUTANEOUS at 08:34

## 2023-10-09 RX ADMIN — ENOXAPARIN SODIUM 40 MG: 100 INJECTION SUBCUTANEOUS at 12:32

## 2023-10-09 RX ADMIN — LORAZEPAM 2 MG: 2 INJECTION INTRAMUSCULAR; INTRAVENOUS at 15:30

## 2023-10-09 RX ADMIN — THIAMINE HYDROCHLORIDE: 100 INJECTION, SOLUTION INTRAMUSCULAR; INTRAVENOUS at 12:31

## 2023-10-09 RX ADMIN — SODIUM CHLORIDE: 9 INJECTION, SOLUTION INTRAVENOUS at 10:28

## 2023-10-09 RX ADMIN — SODIUM CHLORIDE, PRESERVATIVE FREE 20 MG: 5 INJECTION INTRAVENOUS at 08:19

## 2023-10-09 RX ADMIN — LORAZEPAM 1 MG: 1 TABLET ORAL at 10:02

## 2023-10-09 RX ADMIN — POTASSIUM CHLORIDE 10 MEQ: 7.46 INJECTION, SOLUTION INTRAVENOUS at 09:41

## 2023-10-09 RX ADMIN — Medication 10 ML: at 08:27

## 2023-10-09 RX ADMIN — POTASSIUM CHLORIDE 10 MEQ: 7.46 INJECTION, SOLUTION INTRAVENOUS at 10:29

## 2023-10-09 RX ADMIN — DEXMEDETOMIDINE 1 MCG/KG/HR: 100 INJECTION, SOLUTION INTRAVENOUS at 15:28

## 2023-10-09 RX ADMIN — HYDRALAZINE HYDROCHLORIDE 10 MG: 20 INJECTION, SOLUTION INTRAMUSCULAR; INTRAVENOUS at 23:33

## 2023-10-09 RX ADMIN — LORAZEPAM 1 MG: 1 TABLET ORAL at 11:20

## 2023-10-09 RX ADMIN — LABETALOL HYDROCHLORIDE 10 MG: 5 INJECTION, SOLUTION INTRAVENOUS at 20:40

## 2023-10-09 RX ADMIN — WATER: 1 INJECTION INTRAMUSCULAR; INTRAVENOUS; SUBCUTANEOUS at 15:15

## 2023-10-09 RX ADMIN — LORAZEPAM 1 MG: 1 TABLET ORAL at 08:29

## 2023-10-09 RX ADMIN — SODIUM CHLORIDE: 9 INJECTION, SOLUTION INTRAVENOUS at 22:04

## 2023-10-09 ASSESSMENT — PAIN SCALES - WONG BAKER
WONGBAKER_NUMERICALRESPONSE: 0

## 2023-10-09 ASSESSMENT — PAIN SCALES - GENERAL
PAINLEVEL_OUTOF10: 0

## 2023-10-09 NOTE — PROGRESS NOTES
Completed ICU multidisciplinary rounding, pt was sitting up during the visit, pt has trouble with orientation at times, pt has no HCPOA a this time, pt mother is his designated POA asa next of Kin,   On going spiritual health care available as needed
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
## 2023-10-09 NOTE — PROGRESS NOTES
Patient reminded several times to stay in bed. He has spoken about leaving but has promised to stay the night. Charge nurse and Dr. Tegan Paniagua aware. Virtual  ordered. 2000 Virtual  present in the room. Patient has a dry cough that was present upon arrival to ICU. Restraints came off at the beginning of the shift. Patient is hyper talkative and tells stories of previous medical experiences from his childhood. 0100 Patient becoming flirtatious when this RN replaced his nasal cannula. He has removed it several times throughout the night and continues to remove it This RN set boundaries, I asked the patient not to touch me. Patient was receptive and agreed. Patient appetite is appropriate. Patient requests to urinate. Serrano in place, per nurses judgement, Serrano was removed. Patient also requested a nicotine patch. See MAR for order details  Patient found attempting to remove IV's. Patient is implusive and flirtatious. Patient has made sexual comments. 4002 Patient is only redirectable with nurse in the room.  notified. See new orders.

## 2023-10-09 NOTE — ACP (ADVANCE CARE PLANNING)
Advance Care Planning     Advance Care Planning Inpatient Note  Spiritual Care Department    Today's Date: 10/9/2023  [place] [facility] [place]    Received request from IDT Member. Upon review of chart and communication with care team, patient's decision making abilities are not in question. . Patient was/were present in the room during visit. Goals of ACP Conversation:  Discuss advance care planning documents    Health Care Decision Makers:       Primary Decision Maker: Jeff Galvan - Parent - [phone]  Summary:  Documented Next of Kin, per patient report    Advance Care Planning Documents (Patient Wishes):  None     Assessment:  Pt aware that his mother, his next-of-kin, makes health care decisions on his behalf when he is incapacitated. He does not name anyone else who could speak for him at this time, whom he would trust. He shares that he does not believe that he has seizures, as she has said. Encouraged him to talk to his medical team. Pt does not engage about his jenae at this time, but is open to talking to [address] [place] [address] again. Pt is alert and oriented enough to engage in conversation today, after speaking to his nurse. Interventions:  Encouraged ongoing ACP conversation with future decision makers and loved ones    Care Preferences Communicated:   No    Outcomes/Plan:  Pt did not engage in ACP, no decisions at this time.      Electronically signed by Malka Araujo, [address] [place] [address] on 10/9/2023 at 9:10 AM

## 2023-10-09 NOTE — PROGRESS NOTES
Nephrology Progress Note    Assessment:  Hyponatremia d/t ETOH  Hypokalemia  ETOH abuse  Facial lump below right eye      Plan: supplement K+ IV   will sign off case Needs AA meetings    Patient Active Problem List:     Psychosis ([address])     Acute alcoholic intoxication with complication ([address])     Substance induced mood disorder ([address])     MDD (major depressive disorder), recurrent episode ([address])     Alcohol dependence ([address])     DTs (delirium tremens) ([address])      Subjective:  Admit Date: 10/7/2023    Interval History: alert oriented    Medications:  Scheduled Meds:   nicotine  1 patch TransDERmal Daily    potassium chloride  10 mEq IntraVENous Q1H    sodium chloride flush  5-40 mL IntraVENous 2 times per day    enoxaparin  40 mg SubCUTAneous Daily    thiamine  100 mg IntraVENous Daily    folic acid 1 mg in sodium chloride 0.9 % 50 mL IVPB  1 mg IntraVENous Daily    famotidine (PEPCID) injection  20 mg IntraVENous BID    sodium chloride flush  5-40 mL IntraVENous 2 times per day     Continuous Infusions:   sodium chloride      sodium chloride 100 mL/hr at 10/09/23 0147    sodium chloride         CBC:   Recent Labs     10/08/23  0323 10/09/23  0325   WBC 8.9 9.1   HGB 12.8* 13.3*   PLT 88* 120*     CMP:    Recent Labs     10/08/23  1158 10/08/23  1732 10/09/23  0325   * 133* 131*   K 2.8* 2.9* 3.3*   CL 81* 84* 88*   CO2 35* 33* 34*   BUN 12 10 7   CREATININE 0.66* 0.64* 0.64*   GLUCOSE 74 70 102*   CALCIUM 6.8* 7.3* 7.9*   LABGLOM >60.0 >60.0 >60.0     Troponin: No results for input(s): \"TROPONINI\" in the last 72 hours. BNP: No results for input(s): \"BNP\" in the last 72 hours. INR: No results for input(s): \"INR\" in the last 72 hours. Lipids:   Recent Labs     10/07/23  2000   CHOL 255*   TRIG 105   *     Liver:   Recent Labs     10/09/23  0325   AST 58*   ALT 33   ALKPHOS 78   PROT 6.0*   LABALBU 3.5   BILITOT 1.0*     Iron:  No results for input(s): \"IRONS\", \"FERRITIN\" in the last 72 hours.     Invalid

## 2023-10-09 NOTE — PROGRESS NOTES
Comprehensive Nutrition Assessment    Type and Reason for Visit:  Initial, Positive Nutrition Screen (+malnutrition screen)    Nutrition Recommendations/Plan:   Recommend BSE to determine need for dysphagia diet  Add frozen oral supplement @ all meals     Malnutrition Assessment:  Malnutrition Status:  Insufficient data (10/09/23 1322)      Nutrition Assessment:    Poor nutritional status susptected PTA due to alcohol abuse, unable to obtained detailed diet history at this time, Noted current order for modified texture diet, recommend BSE to determine need for a dysphagia diet, as this limits pt's available menu options. Will begin an oral nutrition supplement to aid in meeting energy/protein needs. Nutrition Related Findings:    \"admitted for seizures and alcohol withdrawal.  Patient is unable to provide history due to mental status change\" , + CIWA protocol , nursing reports tremors, but able to feed self. No BSE,, labs noted ( replacements ordered), meds reviewed ( thiamine), IVF= .9 NS @ 100 ml/hr Wound Type: None       Current Nutrition Intake & Therapies:    Average Meal Intake: 51-75%, %  Average Supplements Intake: None Ordered  ADULT DIET; Dysphagia - Soft and Bite Sized  ADULT ORAL NUTRITION SUPPLEMENT; Breakfast, Lunch, Dinner; Frozen Oral Supplement    Anthropometric Measures:  Height: 5' 7\" (170.2 cm)  Ideal Body Weight (IBW): 148 lbs (67 kg)    Admission Body Weight: 160 lb (72.6 kg) (stated)  Current Body Weight: 169 lb (76.7 kg), 114.2 % IBW. Weight Source: Bed Scale  Current BMI (kg/m2): 26.5  Usual Body Weight: 150 lb (68 kg) (2019, limited actual wts in EMR)  % Weight Change (Calculated): 12.7                    BMI Categories: Overweight (BMI 25.0-29. 9)    Estimated Daily Nutrient Needs:  Energy Requirements Based On: Kcal/kg  Weight Used for Energy Requirements: Current  Energy (kcal/day): ~1925 kcals @ 25 kcal/kg  Weight Used for Protein Requirements: Ideal  Protein (g/day): 87 g

## 2023-10-09 NOTE — PLAN OF CARE
Nutrition Problem #1: No nutrition diagnosis at this time, Predicted inadequate energy intake  Intervention: Food and/or Nutrient Delivery: Continue Current Diet, Start Oral Nutrition Supplement  Nutritional

## 2023-10-09 NOTE — CARE COORDINATION
Team ICU rounds done this am and pt has consult now for psych to eval. Pt has sitter care 1:1 and mother is next of kin who pt lives with.

## 2023-10-10 LAB
ALBUMIN SERPL-MCNC: 3.1 G/DL (ref 3.5–4.6)
ALP SERPL-CCNC: 69 U/L (ref 35–104)
ALT SERPL-CCNC: 38 U/L (ref 0–41)
ANION GAP SERPL CALCULATED.3IONS-SCNC: 11 MEQ/L (ref 9–15)
ANION GAP SERPL CALCULATED.3IONS-SCNC: 11 MEQ/L (ref 9–15)
ANION GAP SERPL CALCULATED.3IONS-SCNC: 13 MEQ/L (ref 9–15)
AST SERPL-CCNC: 63 U/L (ref 0–40)
BASOPHILS # BLD: 0.1 K/UL (ref 0–0.2)
BASOPHILS NFR BLD: 0.7 %
BILIRUB SERPL-MCNC: 0.7 MG/DL (ref 0.2–0.7)
BUN SERPL-MCNC: 3 MG/DL (ref 6–20)
BUN SERPL-MCNC: 4 MG/DL (ref 6–20)
BUN SERPL-MCNC: 6 MG/DL (ref 6–20)
CALCIUM SERPL-MCNC: 8.1 MG/DL (ref 8.5–9.9)
CALCIUM SERPL-MCNC: 8.1 MG/DL (ref 8.5–9.9)
CALCIUM SERPL-MCNC: 8.6 MG/DL (ref 8.5–9.9)
CHLORIDE SERPL-SCNC: 100 MEQ/L (ref 95–107)
CHLORIDE SERPL-SCNC: 102 MEQ/L (ref 95–107)
CHLORIDE SERPL-SCNC: 99 MEQ/L (ref 95–107)
CO2 SERPL-SCNC: 22 MEQ/L (ref 20–31)
CO2 SERPL-SCNC: 27 MEQ/L (ref 20–31)
CO2 SERPL-SCNC: 27 MEQ/L (ref 20–31)
CREAT SERPL-MCNC: 0.38 MG/DL (ref 0.7–1.2)
CREAT SERPL-MCNC: 0.4 MG/DL (ref 0.7–1.2)
CREAT SERPL-MCNC: 0.62 MG/DL (ref 0.7–1.2)
EKG ATRIAL RATE: 127 BPM
EKG P AXIS: 84 DEGREES
EKG P-R INTERVAL: 114 MS
EKG Q-T INTERVAL: 338 MS
EKG QRS DURATION: 84 MS
EKG QTC CALCULATION (BAZETT): 493 MS
EKG R AXIS: 18 DEGREES
EKG T AXIS: 10 DEGREES
EKG VENTRICULAR RATE: 128 BPM
EOSINOPHIL # BLD: 0.1 K/UL (ref 0–0.7)
EOSINOPHIL NFR BLD: 2.1 %
ERYTHROCYTE [DISTWIDTH] IN BLOOD BY AUTOMATED COUNT: 14.6 % (ref 11.5–14.5)
GLOBULIN SER CALC-MCNC: 2.5 G/DL (ref 2.3–3.5)
GLUCOSE SERPL-MCNC: 89 MG/DL (ref 70–99)
GLUCOSE SERPL-MCNC: 95 MG/DL (ref 70–99)
GLUCOSE SERPL-MCNC: 95 MG/DL (ref 70–99)
HCT VFR BLD AUTO: 41.2 % (ref 42–52)
HGB BLD-MCNC: 13.7 G/DL (ref 14–18)
LYMPHOCYTES # BLD: 1 K/UL (ref 1–4.8)
LYMPHOCYTES NFR BLD: 14.9 %
MAGNESIUM SERPL-MCNC: 1.8 MG/DL (ref 1.7–2.4)
MAGNESIUM SERPL-MCNC: 1.8 MG/DL (ref 1.7–2.4)
MAGNESIUM SERPL-MCNC: 2 MG/DL (ref 1.7–2.4)
MCH RBC QN AUTO: 32.9 PG (ref 27–31.3)
MCHC RBC AUTO-ENTMCNC: 33.3 % (ref 33–37)
MCV RBC AUTO: 99 FL (ref 79–92.2)
MONOCYTES # BLD: 1.2 K/UL (ref 0.2–0.8)
MONOCYTES NFR BLD: 17.2 %
NEUTROPHILS # BLD: 4.4 K/UL (ref 1.4–6.5)
NEUTS SEG NFR BLD: 64.8 %
PLATELET # BLD AUTO: 141 K/UL (ref 130–400)
POTASSIUM SERPL-SCNC: 3 MEQ/L (ref 3.4–4.9)
POTASSIUM SERPL-SCNC: 3.9 MEQ/L (ref 3.4–4.9)
POTASSIUM SERPL-SCNC: 4.3 MEQ/L (ref 3.4–4.9)
PROT SERPL-MCNC: 5.6 G/DL (ref 6.3–8)
RBC # BLD AUTO: 4.16 M/UL (ref 4.7–6.1)
SODIUM SERPL-SCNC: 134 MEQ/L (ref 135–144)
SODIUM SERPL-SCNC: 138 MEQ/L (ref 135–144)
SODIUM SERPL-SCNC: 140 MEQ/L (ref 135–144)
WBC # BLD AUTO: 6.8 K/UL (ref 4.8–10.8)

## 2023-10-10 PROCEDURE — 83735 ASSAY OF MAGNESIUM: CPT

## 2023-10-10 PROCEDURE — A4216 STERILE WATER/SALINE, 10 ML: HCPCS | Performed by: INTERNAL MEDICINE

## 2023-10-10 PROCEDURE — 2580000003 HC RX 258: Performed by: INTERNAL MEDICINE

## 2023-10-10 PROCEDURE — 6360000002 HC RX W HCPCS: Performed by: INTERNAL MEDICINE

## 2023-10-10 PROCEDURE — 2580000003 HC RX 258: Performed by: NURSE PRACTITIONER

## 2023-10-10 PROCEDURE — 85025 COMPLETE CBC W/AUTO DIFF WBC: CPT

## 2023-10-10 PROCEDURE — 80053 COMPREHEN METABOLIC PANEL: CPT

## 2023-10-10 PROCEDURE — 6360000002 HC RX W HCPCS: Performed by: STUDENT IN AN ORGANIZED HEALTH CARE EDUCATION/TRAINING PROGRAM

## 2023-10-10 PROCEDURE — 90792 PSYCH DIAG EVAL W/MED SRVCS: CPT | Performed by: PSYCHIATRY & NEUROLOGY

## 2023-10-10 PROCEDURE — 6360000002 HC RX W HCPCS: Performed by: NURSE PRACTITIONER

## 2023-10-10 PROCEDURE — 6370000000 HC RX 637 (ALT 250 FOR IP): Performed by: INTERNAL MEDICINE

## 2023-10-10 PROCEDURE — 2500000003 HC RX 250 WO HCPCS: Performed by: NURSE PRACTITIONER

## 2023-10-10 PROCEDURE — 93010 ELECTROCARDIOGRAM REPORT: CPT | Performed by: INTERNAL MEDICINE

## 2023-10-10 PROCEDURE — 2700000000 HC OXYGEN THERAPY PER DAY

## 2023-10-10 PROCEDURE — 2000000000 HC ICU R&B

## 2023-10-10 PROCEDURE — 2580000003 HC RX 258: Performed by: STUDENT IN AN ORGANIZED HEALTH CARE EDUCATION/TRAINING PROGRAM

## 2023-10-10 PROCEDURE — 2500000003 HC RX 250 WO HCPCS: Performed by: INTERNAL MEDICINE

## 2023-10-10 PROCEDURE — 99291 CRITICAL CARE FIRST HOUR: CPT | Performed by: INTERNAL MEDICINE

## 2023-10-10 PROCEDURE — 36415 COLL VENOUS BLD VENIPUNCTURE: CPT

## 2023-10-10 RX ORDER — MAGNESIUM SULFATE IN WATER 40 MG/ML
2000 INJECTION, SOLUTION INTRAVENOUS ONCE
Status: COMPLETED | OUTPATIENT
Start: 2023-10-10 | End: 2023-10-10

## 2023-10-10 RX ORDER — POTASSIUM CHLORIDE 7.45 MG/ML
10 INJECTION INTRAVENOUS
Status: COMPLETED | OUTPATIENT
Start: 2023-10-10 | End: 2023-10-10

## 2023-10-10 RX ADMIN — MAGNESIUM SULFATE HEPTAHYDRATE 2000 MG: 40 INJECTION, SOLUTION INTRAVENOUS at 10:45

## 2023-10-10 RX ADMIN — POTASSIUM CHLORIDE 10 MEQ: 7.46 INJECTION, SOLUTION INTRAVENOUS at 09:02

## 2023-10-10 RX ADMIN — HYDRALAZINE HYDROCHLORIDE 10 MG: 20 INJECTION, SOLUTION INTRAMUSCULAR; INTRAVENOUS at 10:35

## 2023-10-10 RX ADMIN — SODIUM CHLORIDE: 9 INJECTION, SOLUTION INTRAVENOUS at 05:58

## 2023-10-10 RX ADMIN — SODIUM CHLORIDE, PRESERVATIVE FREE 20 MG: 5 INJECTION INTRAVENOUS at 10:15

## 2023-10-10 RX ADMIN — THIAMINE HYDROCHLORIDE 100 MG: 100 INJECTION, SOLUTION INTRAMUSCULAR; INTRAVENOUS at 10:15

## 2023-10-10 RX ADMIN — POTASSIUM CHLORIDE 10 MEQ: 7.46 INJECTION, SOLUTION INTRAVENOUS at 05:58

## 2023-10-10 RX ADMIN — HYDRALAZINE HYDROCHLORIDE 10 MG: 20 INJECTION, SOLUTION INTRAMUSCULAR; INTRAVENOUS at 03:32

## 2023-10-10 RX ADMIN — LABETALOL HYDROCHLORIDE 10 MG: 5 INJECTION, SOLUTION INTRAVENOUS at 02:57

## 2023-10-10 RX ADMIN — Medication 10 ML: at 22:27

## 2023-10-10 RX ADMIN — FOLIC ACID 1 MG: 5 INJECTION, SOLUTION INTRAMUSCULAR; INTRAVENOUS; SUBCUTANEOUS at 12:30

## 2023-10-10 RX ADMIN — Medication 10 ML: at 21:00

## 2023-10-10 RX ADMIN — ENOXAPARIN SODIUM 40 MG: 100 INJECTION SUBCUTANEOUS at 09:02

## 2023-10-10 RX ADMIN — LABETALOL HYDROCHLORIDE 10 MG: 5 INJECTION, SOLUTION INTRAVENOUS at 23:45

## 2023-10-10 RX ADMIN — SODIUM CHLORIDE, PRESERVATIVE FREE 20 MG: 5 INJECTION INTRAVENOUS at 21:00

## 2023-10-10 RX ADMIN — HYDRALAZINE HYDROCHLORIDE 10 MG: 20 INJECTION, SOLUTION INTRAMUSCULAR; INTRAVENOUS at 23:11

## 2023-10-10 RX ADMIN — POTASSIUM CHLORIDE 10 MEQ: 7.46 INJECTION, SOLUTION INTRAVENOUS at 08:00

## 2023-10-10 RX ADMIN — SODIUM CHLORIDE: 9 INJECTION, SOLUTION INTRAVENOUS at 16:33

## 2023-10-10 RX ADMIN — LORAZEPAM 1 MG: 2 INJECTION INTRAMUSCULAR; INTRAVENOUS at 23:05

## 2023-10-10 RX ADMIN — POTASSIUM CHLORIDE 10 MEQ: 7.46 INJECTION, SOLUTION INTRAVENOUS at 06:57

## 2023-10-10 ASSESSMENT — PAIN SCALES - GENERAL
PAINLEVEL_OUTOF10: 0

## 2023-10-10 ASSESSMENT — PAIN SCALES - WONG BAKER
WONGBAKER_NUMERICALRESPONSE: 0

## 2023-10-10 NOTE — PROGRESS NOTES
Pt awake at this time. Alert and oriented x 2. Cooperative at this time. Refusing breakfast at this time. Denies hallucinations , sweats or tremors at this time.

## 2023-10-10 NOTE — PLAN OF CARE
Problem: Safety - Medical Restraint  Goal: Remains free of injury from restraints (Restraint for Interference with Medical Device)  Description: INTERVENTIONS:  1. Determine that other, less restrictive measures have been tried or would not be effective before applying the restraint  2. Evaluate the patient's condition at the time of restraint application  3. Inform patient/family regarding the reason for restraint  4.  Q2H: Monitor safety, psychosocial status, comfort, nutrition and hydration  Outcome: Progressing  Flowsheets  Taken 10/9/2023 2145 by Liz Kumar RN  Remains free of injury from restraints (restraint for interference with medical device): Every 2 hours: Monitor safety, psychosocial status, comfort, nutrition and hydration  Taken 10/9/2023 2130 by Liz Kumar RN  Remains free of injury from restraints (restraint for interference with medical device): Every 2 hours: Monitor safety, psychosocial status, comfort, nutrition and hydration  Taken 10/9/2023 2115 by Liz Kumar RN  Remains free of injury from restraints (restraint for interference with medical device): Every 2 hours: Monitor safety, psychosocial status, comfort, nutrition and hydration  Taken 10/9/2023 2100 by Liz Kumar RN  Remains free of injury from restraints (restraint for interference with medical device): Every 2 hours: Monitor safety, psychosocial status, comfort, nutrition and hydration  Taken 10/9/2023 2045 by Liz Kumar RN  Remains free of injury from restraints (restraint for interference with medical device): Every 2 hours: Monitor safety, psychosocial status, comfort, nutrition and hydration  Taken 10/9/2023 2030 by Liz Kumar RN  Remains free of injury from restraints (restraint for interference with medical device): Every 2 hours: Monitor safety, psychosocial status, comfort, nutrition and hydration  Taken 10/9/2023 2015 by Liz Kumar RN  Remains free of injury from restraints (restraint for

## 2023-10-10 NOTE — PROGRESS NOTES
03:00-07:30 Shift Summary   Bedside handoff report received from Puneet Ma RN. Assessments completed (see flowsheets). Pt remains in soft restraints with sitter at bedside for pt safety. Seizure precautions remain in place, no seizure like activity noted throughout shift. Pt A&O to person, place, and time, often disoriented to situation; reoriented as needed. IV medications titrated/infused per orders (see eMar), pt tolerated well. Bed bath and complete linen change provided, pt tolerated well. Labs drawn by . Lab results received and reported to Dr. Frederick Taylor, [facility]. No s/s of distress noted. Bedside handoff report given to oncoming RN. Safety measures in place. Sitter remained at bedside throughout shift for pt safety; day shift sitter at bedside.

## 2023-10-10 NOTE — PROGRESS NOTES
Repositioned in bed for lunch. Mother at bedside. Restraints loosened for eating. Denies pain at this time.

## 2023-10-10 NOTE — PROGRESS NOTES
Assumed care of this patient at 1900. Patient is restless, in 4 point hard restraints and on precedex gtt at this time. Soft 4 point restraint order obtained. Transitioned patient to 4 point soft restraints since precedex is infusing. Patient BP remains elevated- PRN Labetalol given, messaged Dr. Silvia Weiner for PRN Hydralazine- awaiting new orders. Patients Left pupil has a black triangular shape in the lower right section of the iris going up into the pupil. This nurse did not see prior documentation of this finding- messaged Dr. Silvia Weiner to come lay eyes on this patient. 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

## 2023-10-10 NOTE — CARE COORDINATION
Team ICU rounds done at bedside. Pt apologized for [facility] and  any disruptions he caused. Pt on IV Precedex still and has order for Psych to see. He may need PT/OT eval once off drip and cooperative. He lives w mom .
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
## 2023-10-11 LAB
ANION GAP SERPL CALCULATED.3IONS-SCNC: 10 MEQ/L (ref 9–15)
ANION GAP SERPL CALCULATED.3IONS-SCNC: 10 MEQ/L (ref 9–15)
BASOPHILS # BLD: 0.3 K/UL (ref 0–0.2)
BASOPHILS NFR BLD: 4 %
BUN SERPL-MCNC: 6 MG/DL (ref 6–20)
BUN SERPL-MCNC: 6 MG/DL (ref 6–20)
CALCIUM SERPL-MCNC: 8.4 MG/DL (ref 8.5–9.9)
CALCIUM SERPL-MCNC: 8.4 MG/DL (ref 8.5–9.9)
CHLORIDE SERPL-SCNC: 97 MEQ/L (ref 95–107)
CHLORIDE SERPL-SCNC: 99 MEQ/L (ref 95–107)
CO2 SERPL-SCNC: 23 MEQ/L (ref 20–31)
CO2 SERPL-SCNC: 25 MEQ/L (ref 20–31)
CREAT SERPL-MCNC: 0.49 MG/DL (ref 0.7–1.2)
CREAT SERPL-MCNC: 0.56 MG/DL (ref 0.7–1.2)
EOSINOPHIL # BLD: 0.2 K/UL (ref 0–0.7)
EOSINOPHIL NFR BLD: 3 %
ERYTHROCYTE [DISTWIDTH] IN BLOOD BY AUTOMATED COUNT: 14.8 % (ref 11.5–14.5)
GLUCOSE SERPL-MCNC: 105 MG/DL (ref 70–99)
GLUCOSE SERPL-MCNC: 93 MG/DL (ref 70–99)
HCT VFR BLD AUTO: 41.5 % (ref 42–52)
HGB BLD-MCNC: 14.3 G/DL (ref 14–18)
LYMPHOCYTES # BLD: 0.8 K/UL (ref 1–4.8)
LYMPHOCYTES NFR BLD: 11 %
MAGNESIUM SERPL-MCNC: 1.7 MG/DL (ref 1.7–2.4)
MCH RBC QN AUTO: 33.8 PG (ref 27–31.3)
MCHC RBC AUTO-ENTMCNC: 34.5 % (ref 33–37)
MCV RBC AUTO: 98.1 FL (ref 79–92.2)
MONOCYTES # BLD: 1.6 K/UL (ref 0.2–0.8)
MONOCYTES NFR BLD: 22.1 %
NEUTROPHILS # BLD: 4.6 K/UL (ref 1.4–6.5)
NEUTS SEG NFR BLD: 61 %
NRBC BLD-RTO: 1 /100 WBC
PLATELET # BLD AUTO: 211 K/UL (ref 130–400)
PLATELET BLD QL SMEAR: ADEQUATE
POTASSIUM SERPL-SCNC: 3.5 MEQ/L (ref 3.4–4.9)
POTASSIUM SERPL-SCNC: 4.2 MEQ/L (ref 3.4–4.9)
RBC # BLD AUTO: 4.23 M/UL (ref 4.7–6.1)
RBC MORPH BLD: NORMAL
REASON FOR REJECTION: NORMAL
REJECTED TEST: NORMAL
SLIDE REVIEW: ABNORMAL
SODIUM SERPL-SCNC: 130 MEQ/L (ref 135–144)
SODIUM SERPL-SCNC: 134 MEQ/L (ref 135–144)
WBC # BLD AUTO: 7.5 K/UL (ref 4.8–10.8)

## 2023-10-11 PROCEDURE — A4216 STERILE WATER/SALINE, 10 ML: HCPCS | Performed by: INTERNAL MEDICINE

## 2023-10-11 PROCEDURE — 6360000002 HC RX W HCPCS: Performed by: NURSE PRACTITIONER

## 2023-10-11 PROCEDURE — 83735 ASSAY OF MAGNESIUM: CPT

## 2023-10-11 PROCEDURE — 99232 SBSQ HOSP IP/OBS MODERATE 35: CPT | Performed by: INTERNAL MEDICINE

## 2023-10-11 PROCEDURE — 6370000000 HC RX 637 (ALT 250 FOR IP): Performed by: NURSE PRACTITIONER

## 2023-10-11 PROCEDURE — 6360000002 HC RX W HCPCS: Performed by: INTERNAL MEDICINE

## 2023-10-11 PROCEDURE — 6370000000 HC RX 637 (ALT 250 FOR IP): Performed by: INTERNAL MEDICINE

## 2023-10-11 PROCEDURE — 36415 COLL VENOUS BLD VENIPUNCTURE: CPT

## 2023-10-11 PROCEDURE — 80048 BASIC METABOLIC PNL TOTAL CA: CPT

## 2023-10-11 PROCEDURE — 2580000003 HC RX 258: Performed by: NURSE PRACTITIONER

## 2023-10-11 PROCEDURE — 6360000002 HC RX W HCPCS: Performed by: STUDENT IN AN ORGANIZED HEALTH CARE EDUCATION/TRAINING PROGRAM

## 2023-10-11 PROCEDURE — 2500000003 HC RX 250 WO HCPCS: Performed by: NURSE PRACTITIONER

## 2023-10-11 PROCEDURE — 85025 COMPLETE CBC W/AUTO DIFF WBC: CPT

## 2023-10-11 PROCEDURE — 1210000000 HC MED SURG R&B

## 2023-10-11 PROCEDURE — 2700000000 HC OXYGEN THERAPY PER DAY

## 2023-10-11 PROCEDURE — 2500000003 HC RX 250 WO HCPCS: Performed by: INTERNAL MEDICINE

## 2023-10-11 PROCEDURE — 2580000003 HC RX 258: Performed by: INTERNAL MEDICINE

## 2023-10-11 RX ORDER — FAMOTIDINE 20 MG/1
20 TABLET, FILM COATED ORAL 2 TIMES DAILY
Status: DISCONTINUED | OUTPATIENT
Start: 2023-10-11 | End: 2023-10-12 | Stop reason: HOSPADM

## 2023-10-11 RX ADMIN — LORAZEPAM 2 MG: 2 INJECTION INTRAMUSCULAR; INTRAVENOUS at 03:45

## 2023-10-11 RX ADMIN — FAMOTIDINE 20 MG: 20 TABLET ORAL at 20:38

## 2023-10-11 RX ADMIN — LABETALOL HYDROCHLORIDE 10 MG: 5 INJECTION, SOLUTION INTRAVENOUS at 03:45

## 2023-10-11 RX ADMIN — THIAMINE HYDROCHLORIDE 100 MG: 100 INJECTION, SOLUTION INTRAMUSCULAR; INTRAVENOUS at 08:56

## 2023-10-11 RX ADMIN — SODIUM CHLORIDE: 9 INJECTION, SOLUTION INTRAVENOUS at 03:08

## 2023-10-11 RX ADMIN — FOLIC ACID 1 MG: 5 INJECTION, SOLUTION INTRAMUSCULAR; INTRAVENOUS; SUBCUTANEOUS at 09:07

## 2023-10-11 RX ADMIN — LABETALOL HYDROCHLORIDE 10 MG: 5 INJECTION, SOLUTION INTRAVENOUS at 20:38

## 2023-10-11 RX ADMIN — HYDRALAZINE HYDROCHLORIDE 10 MG: 20 INJECTION, SOLUTION INTRAMUSCULAR; INTRAVENOUS at 21:06

## 2023-10-11 RX ADMIN — SODIUM CHLORIDE: 9 INJECTION, SOLUTION INTRAVENOUS at 14:22

## 2023-10-11 RX ADMIN — ENOXAPARIN SODIUM 40 MG: 100 INJECTION SUBCUTANEOUS at 08:56

## 2023-10-11 RX ADMIN — SODIUM CHLORIDE, PRESERVATIVE FREE 20 MG: 5 INJECTION INTRAVENOUS at 08:56

## 2023-10-11 ASSESSMENT — PAIN SCALES - GENERAL
PAINLEVEL_OUTOF10: 0

## 2023-10-11 NOTE — PLAN OF CARE
----- Message from Homero Ruiz MD sent at 9/10/2020  8:29 AM CDT -----  Recheck CMP uric acid CBC 6 month   Problem: Safety - Medical Restraint  Goal: Remains free of injury from restraints (Restraint for Interference with Medical Device)  Description: INTERVENTIONS:  1. Determine that other, less restrictive measures have been tried or would not be effective before applying the restraint  2. Evaluate the patient's condition at the time of restraint application  3. Inform patient/family regarding the reason for restraint  4. Q2H: Monitor safety, psychosocial status, comfort, nutrition and hydration  Outcome: Progressing     Problem: Discharge Planning  Goal: Discharge to home or other facility with appropriate resources  Outcome: Progressing     Problem: Confusion  Goal: Confusion, delirium, dementia, or psychosis is improved or at baseline  Description: INTERVENTIONS:  1. Assess for possible contributors to thought disturbance, including medications, impaired vision or hearing, underlying metabolic abnormalities, dehydration, psychiatric diagnoses, and notify attending LIP  2. [place] high risk fall precautions, as indicated  3. Provide frequent short contacts to provide reality reorientation, refocusing and direction  4. Decrease environmental stimuli, including noise as appropriate  5. Monitor and intervene to maintain adequate nutrition, hydration, elimination, sleep and activity  6. If unable to ensure safety without constant attention obtain sitter and review sitter guidelines with assigned personnel  7.  Initiate Psychosocial CNS and Spiritual Care consult, as indicated  Outcome: Progressing     Problem: Pain  Goal: Verbalizes/displays adequate comfort level or baseline comfort level  Outcome: Progressing  Flowsheets  Taken 10/11/2023 0400  Verbalizes/displays adequate comfort level or baseline comfort level:   Encourage patient to monitor pain and request assistance   Assess pain using appropriate pain scale   Administer analgesics based on type and severity of pain and evaluate response  Taken 10/11/2023 0000  Verbalizes/displays adequate comfort level or baseline comfort level:   Encourage patient to monitor pain and request assistance   Assess pain using appropriate pain scale   Administer analgesics based on type and severity of pain and evaluate response  Taken 10/10/2023 2000  Verbalizes/displays adequate comfort level or baseline comfort level:   Encourage patient to monitor pain and request assistance   Assess pain using appropriate pain scale   Administer analgesics based on type and severity of pain and evaluate response     Problem: Safety - Adult  Goal: Free from fall injury  Outcome: Progressing     Problem: Skin/Tissue Integrity  Goal: Absence of new skin breakdown  Description: 1. Monitor for areas of redness and/or skin breakdown  2. Assess vascular access sites hourly  3. Every 4-6 hours minimum:  Change oxygen saturation probe site  4. Every 4-6 hours:  If on nasal continuous positive airway pressure, respiratory therapy assess nares and determine need for appliance change or resting period. Outcome: Progressing     Problem: ABCDS Injury Assessment  Goal: Absence of physical injury  Outcome: Progressing     Problem: Risk for Elopement  Goal: Patient will not exit the unit/facility without proper excort  Outcome: Progressing     Problem: Neurosensory - Adult  Goal: Achieves stable or improved neurological status  Outcome: Progressing     Problem: Respiratory - Adult  Goal: Achieves optimal ventilation and oxygenation  Outcome: Progressing     Problem: Nutrition Deficit:  Goal: Optimize nutritional status  Outcome: Progressing     Problem: Safety - Violent/Self-destructive Restraint  Goal: Remains free of injury from restraints (Restraint for Violent/Self-Destructive Behavior)  Description: INTERVENTIONS:  1. Determine that de-escalation and other, less restrictive measures have been tried or would not be effective before applying the restraint  2.  Identify and document the criteria for

## 2023-10-11 NOTE — PROGRESS NOTES
19:00-07:30 Shift summary:        Pt had an uneventful night. Assessments completed (see flowsheets). Pt A&Ox4, able to make need and wants known. Pleasant and cooperative with staff. Pt easily redirectable. IV fluids infusing per order (see eMAR), pt tolerating well. PRN HTN medication administered per orders (see eMAR) with positive effect. CIWA assessments completed per orders, PRN Ativan administered per PRN order and CIWA score (see eMAR) with positive effect. Pt transferred to and from bed/commode with slow steady gait and nurse stand by assist w/o incident. No seizure like activity noted throughout shift. Bed bath and completed linen change provided. Pt denies pain or distress throughout shift. Labs drawn by . No s/s of distress noted. Bedside handoff report given to on coming RN. Safety measures in place.

## 2023-10-11 NOTE — ACP (ADVANCE CARE PLANNING)
Advance Care Planning     Advance Care Planning Activator (Inpatient)  Conversation Note      Date of ACP Conversation: 10/11/2023     Conversation Conducted with: Patient with Decision Making Capacity    ACP Activator: [id number]:     Current Designated Health Care Decision Maker:     Primary Decision Maker: Dino Johnson - [facility] - [phone]  Click here to complete Healthcare Decision Makers including section of the Healthcare Decision Maker Relationship (ie \"Primary\")  Today we documented Decision Maker(s) consistent with Legal Next of Kin hierarchy. Care Preferences    Ventilation: \"If you were in your present state of health and suddenly became very ill and were unable to breathe on your own, what would your preference be about the use of a ventilator (breathing machine) if it were available to you? \"      Would the patient desire the use of ventilator (breathing machine)?: yes    \"If your health worsens and it becomes clear that your chance of recovery is unlikely, what would your preference be about the use of a ventilator (breathing machine) if it were available to you? \"     Would the patient desire the use of ventilator (breathing machine)?: unsure at this time, quality of life decision      Resuscitation  \"CPR works best to restart the heart when there is a sudden event, like a heart attack, in someone who is otherwise healthy. Unfortunately, CPR does not typically restart the heart for people who have serious health conditions or who are very sick. \"    \"In the event your heart stopped as a result of an underlying serious health condition, would you want attempts to be made to restart your heart (answer \"yes\" for attempt to resuscitate) or would you prefer a natural death (answer \"no\" for do not attempt to resuscitate)? \"  Quality of life decision        [] Yes   [] No   Educated Patient / Decision Maker regarding differences between Advance Directives and portable DNR orders.     Length of ACP Conversation in minutes:  15    Conversation Outcomes:  ACP discussion completed and New Advance Directive completed    Follow-up plan:    [] Schedule follow-up conversation to continue planning  [] Referred individual to Provider for additional questions/concerns   [] Advised patient/agent/surrogate to review completed ACP document and update if needed with changes in condition, patient preferences or care setting    [] This note routed to one or more involved healthcare providers

## 2023-10-11 NOTE — CARE COORDINATION
MET W/ PATIENT IN ROOM TO DISCUSS D/C PLAN. PATIENT STATES HE WOULD LIKE TO GO TO REHAB FOR ALCOHOL. DISCUSSED RESOURCE OPTIONS. PATIENT GIVEN BRIGHT VIEW, LETS GET REAL AND [place] CONTACT INFORMATION.

## 2023-10-11 NOTE — PROGRESS NOTES
Pt awaken for assessment, up in bed for breakfast. Denies head ache or chest pain at this time. Moves extremities x 4 without diff. Fine tremors felt at finger tips but denies tremors.

## 2023-10-11 NOTE — PLAN OF CARE
Problem: Safety - Medical Restraint  Goal: Remains free of injury from restraints (Restraint for Interference with Medical Device)  Description: INTERVENTIONS:  1. Determine that other, less restrictive measures have been tried or would not be effective before applying the restraint  2. Evaluate the patient's condition at the time of restraint application  3. Inform patient/family regarding the reason for restraint  4. Q2H: Monitor safety, psychosocial status, comfort, nutrition and hydration  10/11/2023 1623 by Aixa Gomez RN  Outcome: Progressing  10/11/2023 0417 by Jose Weldon RN  Outcome: Progressing     Problem: Discharge Planning  Goal: Discharge to home or other facility with appropriate resources  10/11/2023 1623 by Aixa Gomez RN  Outcome: Progressing  10/11/2023 0417 by Jose Weldon RN  Outcome: Progressing     Problem: Confusion  Goal: Confusion, delirium, dementia, or psychosis is improved or at baseline  Description: INTERVENTIONS:  1. Assess for possible contributors to thought disturbance, including medications, impaired vision or hearing, underlying metabolic abnormalities, dehydration, psychiatric diagnoses, and notify attending LIP  2. [place] high risk fall precautions, as indicated  3. Provide frequent short contacts to provide reality reorientation, refocusing and direction  4. Decrease environmental stimuli, including noise as appropriate  5. Monitor and intervene to maintain adequate nutrition, hydration, elimination, sleep and activity  6. If unable to ensure safety without constant attention obtain sitter and review sitter guidelines with assigned personnel  7.  Initiate Psychosocial CNS and Spiritual Care consult, as indicated  10/11/2023 1623 by Aixa Gomez RN  Outcome: Progressing  Flowsheets (Taken 10/11/2023 0845 by Gisele Lundberg RN)  Effect of thought disturbance (confusion, delirium, dementia, or psychosis) are managed with adequate functional

## 2023-10-11 NOTE — PROGRESS NOTES
PHARMACY NOTE:   ICU Rounds Attended (10-15 minutes in patient room):    Pt diagnosis: alcohol withdrawal    IV Fluids: NS @ 100   Renal:   Recent Labs     10/10/23  1804 10/11/23  0257 10/11/23  0343   CREATININE 0.62* 0.56* 0.49*    Estimated Creatinine Clearance: 178 mL/min (A) (based on SCr of 0.49 mg/dL (L)). Antimicrobial Therapy:   no    Recent Labs     10/09/23  0325 10/10/23  0315 10/11/23  0343   WBC 9.1 6.8 7.5         Drips: CIWA    Insulin Therapy (goal: 140-180): none    Recent Labs     10/10/23  1804 10/11/23  0257 10/11/23  0343   GLUCOSE 95 105* 93       Stress Ulcer Prophylaxis:   Famotidine     DVT Prophylaxis/Anticoagulant Therapy: Lovenox 40mg  Recent Labs     10/09/23  0325 10/10/23  0315 10/11/23  0343   * 141 211     No results for input(s): \"INR\" in the last 72 hours.       Bowel Regimen:   Miralax prn    Follow up/Changes:   -precedex dc'd per verbal  -follow up resume home meds as appropriate: amlodipine, trazodone     Tanya Meza, [facility] PharmD

## 2023-10-12 VITALS
RESPIRATION RATE: 20 BRPM | DIASTOLIC BLOOD PRESSURE: 90 MMHG | SYSTOLIC BLOOD PRESSURE: 147 MMHG | OXYGEN SATURATION: 99 % | TEMPERATURE: 98.1 F | HEIGHT: 67 IN | WEIGHT: 159.39 LBS | BODY MASS INDEX: 25.02 KG/M2 | HEART RATE: 96 BPM

## 2023-10-12 PROBLEM — R56.9 ALCOHOL WITHDRAWAL SEIZURE WITHOUT COMPLICATION (HCC): Status: ACTIVE | Noted: 2023-10-12

## 2023-10-12 PROBLEM — F10.930 ALCOHOL WITHDRAWAL SEIZURE WITHOUT COMPLICATION (HCC): Status: ACTIVE | Noted: 2023-10-12

## 2023-10-12 LAB
ANION GAP SERPL CALCULATED.3IONS-SCNC: 13 MEQ/L (ref 9–15)
BASOPHILS # BLD: 0.1 K/UL (ref 0–0.2)
BASOPHILS NFR BLD: 1 %
BUN SERPL-MCNC: 6 MG/DL (ref 6–20)
CALCIUM SERPL-MCNC: 8.3 MG/DL (ref 8.5–9.9)
CHLORIDE SERPL-SCNC: 104 MEQ/L (ref 95–107)
CO2 SERPL-SCNC: 22 MEQ/L (ref 20–31)
CREAT SERPL-MCNC: 0.54 MG/DL (ref 0.7–1.2)
EKG ATRIAL RATE: 117 BPM
EKG P AXIS: 54 DEGREES
EKG P-R INTERVAL: 124 MS
EKG Q-T INTERVAL: 384 MS
EKG QRS DURATION: 84 MS
EKG QTC CALCULATION (BAZETT): 535 MS
EKG R AXIS: 4 DEGREES
EKG T AXIS: -7 DEGREES
EKG VENTRICULAR RATE: 117 BPM
EOSINOPHIL # BLD: 0.1 K/UL (ref 0–0.7)
EOSINOPHIL NFR BLD: 1.7 %
ERYTHROCYTE [DISTWIDTH] IN BLOOD BY AUTOMATED COUNT: 14.9 % (ref 11.5–14.5)
GLUCOSE SERPL-MCNC: 93 MG/DL (ref 70–99)
HCT VFR BLD AUTO: 38.4 % (ref 42–52)
HGB BLD-MCNC: 13.1 G/DL (ref 14–18)
LYMPHOCYTES # BLD: 1.4 K/UL (ref 1–4.8)
LYMPHOCYTES NFR BLD: 22.9 %
MCH RBC QN AUTO: 33.7 PG (ref 27–31.3)
MCHC RBC AUTO-ENTMCNC: 34.1 % (ref 33–37)
MCV RBC AUTO: 98.7 FL (ref 79–92.2)
MONOCYTES # BLD: 1.7 K/UL (ref 0.2–0.8)
MONOCYTES NFR BLD: 28.7 %
NEUTROPHILS # BLD: 2.7 K/UL (ref 1.4–6.5)
NEUTS SEG NFR BLD: 45.2 %
PLATELET # BLD AUTO: 264 K/UL (ref 130–400)
POTASSIUM SERPL-SCNC: 3.6 MEQ/L (ref 3.4–4.9)
RBC # BLD AUTO: 3.89 M/UL (ref 4.7–6.1)
SODIUM SERPL-SCNC: 139 MEQ/L (ref 135–144)
WBC # BLD AUTO: 5.9 K/UL (ref 4.8–10.8)

## 2023-10-12 PROCEDURE — 6370000000 HC RX 637 (ALT 250 FOR IP): Performed by: NURSE PRACTITIONER

## 2023-10-12 PROCEDURE — 97161 PT EVAL LOW COMPLEX 20 MIN: CPT

## 2023-10-12 PROCEDURE — 6370000000 HC RX 637 (ALT 250 FOR IP): Performed by: INTERNAL MEDICINE

## 2023-10-12 PROCEDURE — 85025 COMPLETE CBC W/AUTO DIFF WBC: CPT

## 2023-10-12 PROCEDURE — 2580000003 HC RX 258: Performed by: NURSE PRACTITIONER

## 2023-10-12 PROCEDURE — 93010 ELECTROCARDIOGRAM REPORT: CPT | Performed by: INTERNAL MEDICINE

## 2023-10-12 PROCEDURE — 2500000003 HC RX 250 WO HCPCS: Performed by: NURSE PRACTITIONER

## 2023-10-12 PROCEDURE — 99232 SBSQ HOSP IP/OBS MODERATE 35: CPT | Performed by: PSYCHIATRY & NEUROLOGY

## 2023-10-12 PROCEDURE — 2580000003 HC RX 258: Performed by: STUDENT IN AN ORGANIZED HEALTH CARE EDUCATION/TRAINING PROGRAM

## 2023-10-12 PROCEDURE — 36415 COLL VENOUS BLD VENIPUNCTURE: CPT

## 2023-10-12 PROCEDURE — 80048 BASIC METABOLIC PNL TOTAL CA: CPT

## 2023-10-12 PROCEDURE — 97165 OT EVAL LOW COMPLEX 30 MIN: CPT

## 2023-10-12 PROCEDURE — 6360000002 HC RX W HCPCS: Performed by: NURSE PRACTITIONER

## 2023-10-12 PROCEDURE — 99232 SBSQ HOSP IP/OBS MODERATE 35: CPT | Performed by: INTERNAL MEDICINE

## 2023-10-12 RX ORDER — FOLIC ACID 1 MG/1
1 TABLET ORAL DAILY
Qty: 90 TABLET | Refills: 1 | Status: SHIPPED | OUTPATIENT
Start: 2023-10-12

## 2023-10-12 RX ORDER — THIAMINE MONONITRATE (VIT B1) 100 MG
100 TABLET ORAL DAILY
Qty: 30 TABLET | Refills: 3 | Status: SHIPPED | OUTPATIENT
Start: 2023-10-12

## 2023-10-12 RX ADMIN — THIAMINE HYDROCHLORIDE 100 MG: 100 INJECTION, SOLUTION INTRAMUSCULAR; INTRAVENOUS at 09:23

## 2023-10-12 RX ADMIN — FOLIC ACID 1 MG: 5 INJECTION, SOLUTION INTRAMUSCULAR; INTRAVENOUS; SUBCUTANEOUS at 11:13

## 2023-10-12 RX ADMIN — Medication 10 ML: at 09:31

## 2023-10-12 RX ADMIN — ENOXAPARIN SODIUM 40 MG: 100 INJECTION SUBCUTANEOUS at 09:23

## 2023-10-12 RX ADMIN — FAMOTIDINE 20 MG: 20 TABLET ORAL at 09:23

## 2023-10-12 RX ADMIN — SODIUM CHLORIDE: 9 INJECTION, SOLUTION INTRAVENOUS at 00:28

## 2023-10-12 RX ADMIN — LABETALOL HYDROCHLORIDE 10 MG: 5 INJECTION, SOLUTION INTRAVENOUS at 09:23

## 2023-10-12 RX ADMIN — Medication 10 ML: at 11:15

## 2023-10-12 RX ADMIN — METOPROLOL TARTRATE 25 MG: 25 TABLET, FILM COATED ORAL at 15:33

## 2023-10-12 NOTE — PLAN OF CARE
Assessment/Plan:  1  Closed fracture of distal end of left femur, unspecified fracture morphology, initial encounter ([facility] [address] )  XR femur 2 vw left    Referral to [facility]   2  S/P ORIF (open reduction internal fixation) fracture  Referral to [address]    I,:  Alexander Schuster am acting as a scribe while in the presence of the attending physician :       I,:  Cortes Mcgrath MD personally performed the services described in this documentation    as scribed in my presence :             X-rays were performed in the office and reviewed, stable apperance since previous post operative imagine in March, no obvious new bone formation  Smoking cessation was discussed with Nirav Wu  He was advised to continue NWB to LLE  He may range the knee to tolerance  He was advised to work on knee ROM as well as straight leg raise  Advised to start home PT as he requires medical transportation, script was provided  He will ultimately likely need a LTKA when his fracture is healed  Follow up in 5 weeks time for repeat left femur x-rays  Subjective:   Danica Arriola is a 66 y o  male who presents to the office today for a follow up regarding his left femur  He underwent ORIF left distal femur with Dr Magalis Mace on 3/16/22  Nirav Steinerer has been NWB to LLE  He has been immobilized in a knee immobilizer  He notes that he is unable to flex his knee  He is currently residing at home  He is not attending outpatient PT or having home health come to his home for therapy exercises  He notes pain to his knee  Review of Systems   Constitutional: Negative for chills, fever and unexpected weight change  HENT: Negative for hearing loss, nosebleeds and sore throat  Eyes: Negative for pain, redness and visual disturbance  Respiratory: Negative for cough, shortness of breath and wheezing  Cardiovascular: Negative for chest pain, palpitations and leg swelling     Gastrointestinal: Discharge instructions reviewed with patient. Verbalized understanding. No further questions or concerns. Medications discussed and reviewed including side effects. Follow-up appointment discussed. Information r/t [facility] provided and options reviewed by case management. [facility] aware of need for lift service. Electronically signed by Thomas Galo RN on 10/12/2023 at 7:52 PM            Problem: Safety - Medical Restraint  Goal: Remains free of injury from restraints (Restraint for Interference with Medical Device)  Description: INTERVENTIONS:  1. Determine that other, less restrictive measures have been tried or would not be effective before applying the restraint  2. Evaluate the patient's condition at the time of restraint application  3. Inform patient/family regarding the reason for restraint  4. Q2H: Monitor safety, psychosocial status, comfort, nutrition and hydration  Outcome: Completed     Problem: Discharge Planning  Goal: Discharge to home or other facility with appropriate resources  Outcome: Completed     Problem: Confusion  Goal: Confusion, delirium, dementia, or psychosis is improved or at baseline  Description: INTERVENTIONS:  1. Assess for possible contributors to thought disturbance, including medications, impaired vision or hearing, underlying metabolic abnormalities, dehydration, psychiatric diagnoses, and notify attending LIP  2. [place] high risk fall precautions, as indicated  3. Provide frequent short contacts to provide reality reorientation, refocusing and direction  4. Decrease environmental stimuli, including noise as appropriate  5. Monitor and intervene to maintain adequate nutrition, hydration, elimination, sleep and activity  6. If unable to ensure safety without constant attention obtain sitter and review sitter guidelines with assigned personnel  7.  Initiate Psychosocial CNS and Spiritual Care consult, as indicated  Outcome: Completed     Problem: Pain  Goal: Negative for abdominal pain, nausea and vomiting  Endocrine: Negative for polydipsia and polyuria  Genitourinary: Negative for difficulty urinating and hematuria  Musculoskeletal: Negative for arthralgias, joint swelling and myalgias  Skin: Negative for rash and wound  Neurological: Negative for dizziness, numbness and headaches  Psychiatric/Behavioral: Negative for decreased concentration, dysphoric mood and suicidal ideas  The patient is not nervous/anxious  Past Medical History:   Diagnosis Date    Chronic pain     Obesity        Past Surgical History:   Procedure Laterality Date    ORIF FEMUR FRACTURE Left 3/16/2022    Procedure: OPEN REDUCTION W/ INTERNAL FIXATION (ORIF) DISTAL FEMUR;  Surgeon: Kurtis Tran MD;  Location: [facility];  Service: Orthopedics    SPINE SURGERY         History reviewed  No pertinent family history      Social History     Occupational History    Not on file   Tobacco Use    Smoking status: Current Every Day Smoker     Packs/day: 2 00     Types: Cigarettes    Smokeless tobacco: Current User   Vaping Use    Vaping Use: Never used   Substance and Sexual Activity    Alcohol use: Not Currently    Drug use: Never    Sexual activity: Not on file         Current Outpatient Medications:     acetaminophen (TYLENOL) 325 mg tablet, Take 2 tablets (650 mg total) by mouth every 6 (six) hours as needed for mild pain, Disp: , Rfl: 0    famotidine (PEPCID) 40 MG tablet, Take 0 5 tablets (20 mg total) by mouth daily, Disp: , Rfl: 0    loratadine-pseudoephedrine (CLARITIN-D 24-HOUR)  mg per 24 hr tablet, Take 1 tablet by mouth daily, Disp: , Rfl: 0    methocarbamol (ROBAXIN) 750 mg tablet, Take 1 tablet (750 mg total) by mouth every 6 (six) hours as needed for muscle spasms, Disp: 120 tablet, Rfl: 0    nicotine (NICODERM CQ) 21 mg/24 hr TD 24 hr patch, Place 1 patch on the skin daily, Disp: 28 patch, Rfl: 0    enoxaparin (LOVENOX) 40 mg/0 4 mL, Inject 0 4 mL (40 mg total) under the skin daily in the early morning for 28 doses, Disp: 11 2 mL, Rfl: 0    levothyroxine 150 mcg tablet, Take 150 mcg by mouth daily in the early morning, Disp: , Rfl:     morphine (MS CONTIN) 100 mg 12 hr tablet, Take 100 mg by mouth every 12 (twelve) hours, Disp: , Rfl:     oxyCODONE-acetaminophen (PERCOCET)  mg per tablet, Take 1 tablet by mouth every 6 (six) hours as needed, Disp: , Rfl:     No Known Allergies    Objective: There were no vitals filed for this visit  Left Knee Exam     Other   Erythema: absent  Scars: present  Sensation: normal  Pulse: present  Swelling: none    Comments: Well healed surgical incision   No knee ROM, sits at -10 degrees of extension   Able to perform a straight leg raise  Sensation intact to light touch   Able to wiggle all toes          Physical Exam  Vitals and nursing note reviewed  Constitutional:       Appearance: He is well-developed  Eyes:      Conjunctiva/sclera: Conjunctivae normal       Pupils: Pupils are equal, round, and reactive to light  Pulmonary:      Effort: Pulmonary effort is normal       Breath sounds: Normal breath sounds  Musculoskeletal:      Comments: As noted in HPI   Skin:     General: Skin is warm and dry  Neurological:      Mental Status: He is alert and oriented to person, place, and time  Psychiatric:         Behavior: Behavior normal          I have personally reviewed pertinent films in PACS and my interpretation is as follows:  X-ray left femur performed in the office today demonstrates stable appearance since post operative images in March  No obvious new bone formation

## 2023-10-12 NOTE — PLAN OF CARE
See OT evaluation for all goals and OT POC.  Electronically signed by FRANCIS Zavaleta/L on 10/12/2023 at 2:41 PM

## 2023-10-12 NOTE — PROGRESS NOTES
[facility] OCCUPATIONAL THERAPY EVALUATION - ACUTE     NAME: Yane Wheeler  : 1975 (41 y.o.)  MRN: [id number]  CODE STATUS: Full Code  [facility]    Date of Service: 10/12/2023    Patient Diagnosis(es): Seizures ([address]) [R56.9]  DTs (delirium tremens) ([address]) [F10.931]  Alcohol withdrawal syndrome, with delirium ([address]) [F10.931]   Patient Active Problem List    Diagnosis Date Noted    Alcohol withdrawal syndrome, with delirium ([address]) 10/07/2023    Alcohol dependence ([address]) 2019    MDD (major depressive disorder), recurrent episode ([address]) 2019    Substance induced mood disorder ([address])     Acute alcoholic intoxication with complication ([address])     Psychosis ([address]) 01/10/2019        Past Medical History:   Diagnosis Date    Arthritis     Asthma     Hypertension     Psychiatric problem      Past Surgical History:   Procedure Laterality Date    TOE AMPUTATION Left     fifth toe        Restrictions  Restrictions/Precautions: Fall Risk, Up as Tolerated     Safety Devices: Safety Devices  Type of Devices: Call light within reach; Left in bed     Patient's date of birth confirmed: Yes    General:  Chart Reviewed: Yes  Patient assessed for rehabilitation services?: Yes    Subjective  Subjective: \"Nothing hurts in particular\"       Pain at start of treatment: No    Pain at end of treatment: No    Location:   Description:   Nursing notified: Not Applicable  RN:   Intervention: None    Prior Level of Function:  Social/Functional History  Lives With: Parent (Mother)  Type of Home: Trailer  Home Layout: One level  Home Access: Stairs to enter with rails  Entrance Stairs - Number of Steps: 3  Entrance Stairs - Rails: Both  Bathroom Shower/Tub: Walk-in shower, Tub only  Bathroom Equipment: Shower chair, Grab bars in shower, Hand-held shower  Home Equipment: Beraprile Adas, standard  ADL Assistance: Independent  Homemaking Assistance: Independent  Ambulation Assistance: Independent (Cane- has leg length independently    Functional Mobility:    Transfers  Sit to stand: Modified independent  Stand to sit: Modified independent    Patient ambulated household distance in hallway with Cane at Independent level. No LOB or difficulty, including managing doors and navigating around obstacles. Bed Mobility  Bed mobility  Supine to Sit: Independent  Sit to Supine: Independent    Seated and Standing Balance:  Balance  Sitting: Intact  Standing: Intact    Functional Endurance:  Activity Tolerance  Activity Tolerance: Patient Tolerated treatment well    D/C Recommendations:  OT D/C RECOMMENDATIONS  REQUIRES OT FOLLOW-UP: No    Equipment Recommendations:  OT Equipment Recommendations  Other: Continue to assess    OT Education:   Patient Education  Education Given To: Patient  Education Provided: Role of Therapy;Plan of Care  Education Method: Verbal  Barriers to Learning: None  Education Outcome: Verbalized understanding    OT Follow Up:   OT D/C RECOMMENDATIONS  REQUIRES OT FOLLOW-UP: No       Assessment/Discharge Disposition:  Assessment: Pt is a 41year old man from home with mother who presents to [facility] with alcohol withdrawal. Pt demonstrates no signficant functional deficits and reports no concerns at this time. No acute OT needs identified.   Prognosis: Good  Discharge Recommendations: Continue to assess pending progress  Decision Making: Low Complexity  History: Pt's medical history is moderately complex  Exam: Pt has no performance deficits  Assistance / Modification: Pt requuires no physical assist    AMPAC (Six Click) Self care Score   How much help is needed for putting on and taking off regular lower body clothing?: None  How much help is needed for bathing (which includes washing, rinsing, drying)?: None  How much help is needed for toileting (which includes using toilet, bedpan, or urinal)?: None  How much help is needed for putting on and taking off regular upper body clothing?: None  How much help is needed

## 2023-10-12 NOTE — CARE COORDINATION
RUPERTW MET WITH THE PT TO DISCUSS HIS ALCOHOL REHAB. HE HAS BEEN ATTENDING AA BUT HE FEELS LIKE HE WILL NEED MORE. HE IS NOT INTERESTED IN INPATIENT REHAB AS HE SAYS THAT HE NEEDS TO BE AVAILABLE TO HELP CARE FOR HIS MOTHER. PT CALLED [facility] AND THEY OFFERED HIM A MEETING THAT IS CLOSE TO HIS HOME. HE PLANS TO ATTEND THAT MEETING. RUPERTW ALSO GAVE THE PT THE INFORMATION ON [facility] IN CASE HE FEELS THAT HE NEEDS MORE THAT HE CAN GET THROUGH THE MEETING. PT VOICED UNDERSTANDING.

## 2023-10-12 NOTE — PROGRESS NOTES
CLINICAL PHARMACY NOTE: MEDS TO BEDS    Total # of Prescriptions Filled: 3   The following medications were delivered to the patient:  Metoprolol Tartrate 25 mg tab  Thiamine 686 mg tab  Folic Acid 1 mg tab    Additional Documentation:

## 2023-10-12 NOTE — CARE COORDINATION
MET WITH PATIENT WILL DC HOME AND FOLLOW UP W LETS GET REAL HE HAS A LIST OF AA MEETING TIMES AND STATES THAT IT IS IN WALKING DISTANCE FROM HIS TRAILER PT DOES NEED A LYFT AT TIME OF DC DEBBIE PRATER AWARE [address] [place] [address].     9945:  PT STATES \"I NEED SOME CLOTHES\" WAS ABLE TO GIVE PT 1 TSHIRT 1 SWEATER, 1 PAIR OF PANTS

## 2023-10-12 NOTE — PROGRESS NOTES
Assistance: Independent  Homemaking Assistance: Independent  Ambulation Assistance: Independent (Cane- has leg length discrepency from childhood accident)  Transfer Assistance: Independent  Active : No  Patient's  Info: Mother drives, but pt typically walks to the store  Additional Comments: Pt takes care of mother- assists with household management. Mother does not ambulate well    OBJECTIVE:   Vision  Vision: Impaired  Vision Exceptions: Wears glasses at all times  Hearing: Within functional limits    Cognition:  Overall Orientation Status: Within Functional Limits  Follows Commands: Within Functional Limits  Overall Cognitive Status: WFL         ROM:  AROM: Within functional limits  PROM: Within functional limits    Strength:  Strength: Within functional limits    Neuro:  Balance  Sitting - Static: Good  Sitting - Dynamic: Good  Standing - Static: Good  Standing - Dynamic: Good                      Coordination: Within functional limits         Bed mobility  Supine to Sit: Independent  Sit to Supine: Independent    Transfers  Sit to Stand: Independent  Stand to Sit: Independent    Ambulation  Surface: Level tile  Device: No Device  Assistance: Independent  Quality of Gait: mild lat sway - no LOB - good judgement and safety  Distance: 200 feet  Comments: no LOB with change of direction and with opening/closing doors    Stairs/Curb  Stairs?: No              Activity Tolerance  Activity Tolerance: Patient tolerated evaluation without incident    Patient Education  Education Given To: Patient  Education Provided: Role of Therapy  Education Method: Verbal  Education Outcome: Verbalized understanding       ASSESSMENT:   Decision Making: Low Complexity  History: med  Exam: low  Clinical Presentation: low    Barriers to Learning: none         DISCHARGE RECOMMENDATIONS:  No Skilled PT: At baseline function    Assessment: Pt demonstrates good mobility and no LOB. He reports he feels safe and is at baseline.  No further PT needs identified  Requires PT Follow-Up: No           Safety Devices  Type of Devices: Call light within reach, Left in bed           AMPAC (6 CLICK) BASIC MOBILITY    24/24    Therapy Time:   Individual   Time In 1417   Time Out 1428   Minutes [address] [place], [place], 10/12/23 at 2:41 PM         Definitions for assistance levels  Independent = pt does not require any physical supervision or assistance from another person for activity completion. Device may be needed.   Stand by assistance = pt requires verbal cues or instructions from another person, close to but not touching, to perform the activity  Minimal assistance= pt performs 75% or more of the activity; assistance is required to complete the activity  Moderate assistance= pt performs 50% of the activity; assistance is required to complete the activity  Maximal assistance = pt performs 25% of the activity; assistance is required to complete the activity  Dependent = pt requires total physical assistance to accomplish the task

## 2024-07-29 ENCOUNTER — HOSPITAL ENCOUNTER (EMERGENCY)
Age: 49
Discharge: HOME | End: 2024-07-30
Attending: EMERGENCY MEDICINE
Payer: MEDICAID

## 2024-07-29 DIAGNOSIS — F10.920 ACUTE ALCOHOLIC INTOXICATION WITHOUT COMPLICATION (HCC): Primary | ICD-10-CM

## 2024-07-29 PROCEDURE — 6360000002 HC RX W HCPCS: Performed by: EMERGENCY MEDICINE

## 2024-07-29 PROCEDURE — 99284 EMERGENCY DEPT VISIT MOD MDM: CPT

## 2024-07-29 PROCEDURE — 96372 THER/PROPH/DIAG INJ SC/IM: CPT

## 2024-07-29 RX ORDER — ZIPRASIDONE MESYLATE 20 MG/ML
20 INJECTION, POWDER, LYOPHILIZED, FOR SOLUTION INTRAMUSCULAR ONCE
Status: COMPLETED | OUTPATIENT
Start: 2024-07-29 | End: 2024-07-29

## 2024-07-29 RX ADMIN — ZIPRASIDONE MESYLATE 20 MG: 20 INJECTION, POWDER, LYOPHILIZED, FOR SOLUTION INTRAMUSCULAR at 21:34

## 2024-07-29 ASSESSMENT — LIFESTYLE VARIABLES
HOW MANY STANDARD DRINKS CONTAINING ALCOHOL DO YOU HAVE ON A TYPICAL DAY: 10 OR MORE
HOW OFTEN DO YOU HAVE A DRINK CONTAINING ALCOHOL: 4 OR MORE TIMES A WEEK

## 2024-07-29 ASSESSMENT — PAIN - FUNCTIONAL ASSESSMENT: PAIN_FUNCTIONAL_ASSESSMENT: NONE - DENIES PAIN

## 2024-07-30 VITALS
DIASTOLIC BLOOD PRESSURE: 99 MMHG | HEIGHT: 68 IN | SYSTOLIC BLOOD PRESSURE: 158 MMHG | TEMPERATURE: 97.8 F | RESPIRATION RATE: 18 BRPM | BODY MASS INDEX: 26.33 KG/M2 | WEIGHT: 173.72 LBS | OXYGEN SATURATION: 95 % | HEART RATE: 112 BPM

## 2024-07-30 ASSESSMENT — PAIN - FUNCTIONAL ASSESSMENT: PAIN_FUNCTIONAL_ASSESSMENT: NONE - DENIES PAIN

## 2024-07-30 NOTE — ED NOTES
Dr. Navarro entered room to attempt to assess pt. Pt. Begins cursing him loudly, screaming.  Orders received for Sylwia, admin. Safely.  Pt. Placed On pulse ox to monitor HR/SaO2.

## 2024-07-30 NOTE — ED NOTES
Pt. Noted to desaturate on room air while sleeping to 88%, placed on 2 liters NC, SaO2 increased to 93%.  Resps even and unlabored, HR remains low 100's.

## 2024-07-30 NOTE — ED NOTES
Pt. Is awake, alert, oriented x3.  He is still not aware of the situation that brought him in but he states he \"has seizures\" and wasn't surprised when he was told he was found on the sidewalk in [place].  He has clear speech, has mild tremor in hands.  He reports drinking a liter of alcohol daily. He denies ever having gone through DT's, he states he lives with his Mother in [place] and she will be his sober ride home but requests we don't call her until at least 0700 because she's elderly and he doesn't want to wake her.  He denies pain and refuses IV, IVF, blood work.  He refuses food when offered but is drinking water.  He is pleasant and does cooperate with RN to obtain a set of VS.  He reports he has a PCP and has prescriptions for his seizures/BP but doesn't take them.  RN offers food again and advises him to call if he needs anything, bathroom is also offered, he refuses at this time. Dr. Navarro aware that pt's Mother will be called at 0700.

## 2024-07-30 NOTE — ED TRIAGE NOTES
Pt. Presents to ED via EMS for alcohol intoxication.  [place] police called 911 due to patient laying on sidewalk yelling.   EMS arrived. And brought patient in for evaluation.

## 2024-07-30 NOTE — ED NOTES
Pt. Laying on right side, pulse ox in place, resps even and unlabored, no signs of distress.  Pt. Smells strongly of alcohol.

## 2024-07-30 NOTE — ED PROVIDER NOTES
[facility] ED  eMERGENCY dEPARTMENT eNCOUnter      Pt Name: Jorge Chavez  MRN: [id number]  Birthdate 1975  Date of evaluation: 7/29/2024  Provider: Deion Navarro MD    CHIEF COMPLAINT       Chief Complaint   Patient presents with    Alcohol Problem     Alcohol intoxication          HISTORY OF PRESENT ILLNESS   (Location/Symptom, Timing/Onset,Context/Setting, Quality, Duration, Modifying Factors, Severity)  Note limiting factors.   Jorge Chavez is a 48 y.o. male who presents to the emergency department for evaluation of alcohol intoxication.  Patient was laying on the sidewalk and disturbing the peace somewhat by yelling.  No visible signs of injury no witnessed injury.  Police first arrived at the scene called EMS to have him transported here and evaluated.  Patient is alert oriented to person and place but also very agitated.  Yelling out and saying does not want to be bothered.  Multiple times he has had \"I know where the fuck I am at leave me alone\"  He denies any pain but is not real cooperative with history.    HPI    NursingNotes were reviewed.    REVIEW OF SYSTEMS    (2-9 systems for level 4, 10 or more for level 5)     Review of Systems   Unable to perform ROS: Other   All other systems reviewed and are negative.      Except as noted above the remainder of the review of systems was reviewed and negative.       PAST MEDICAL HISTORY     Past Medical History:   Diagnosis Date    Alcohol withdrawal seizure without complication (HCC) 10/12/2023    Arthritis     Asthma     Hypertension     Psychiatric problem          SURGICALHISTORY       Past Surgical History:   Procedure Laterality Date    TOE AMPUTATION Left     fifth toe         CURRENT MEDICATIONS       Previous Medications    DIVALPROEX (DEPAKOTE) 500 MG DR TABLET    Take 1 tablet by mouth every 12 hours    FOLIC ACID (FOLVITE) 1 MG TABLET    Take 1 tablet by mouth daily    METOPROLOL TARTRATE (LOPRESSOR) 25 MG TABLET    Take 1 tablet

## 2024-07-30 NOTE — ED NOTES
I spoke with Laurel, House Supervisor, and informed that no ride is available for pt-none of pt's family (his mother or sister will return calls for a ride home). Laurel states she will set up a [employer] ride.

## 2024-07-30 NOTE — ED NOTES
Pt. Placed back on 2 liters O2, SaO2 drops to 84-87% on room air while asleep.  SaO2 at 95% on 2 liters.

## 2024-07-30 NOTE — ED NOTES
Pt is escorted to waiting area by DEBBIE Blackwell and informed to watch for a [place] that will arrive in 15 minutes to take him home.  Pt voiced understanding without further questions.

## 2024-07-30 NOTE — ED NOTES
Pt. Woke up, sitting at foot of bed, RN speaking with pt.  He still reeks strongly of ETOH, is still disoriented, unsure of situation or where he is at.  Pt. Has slurred speech, states \"I fucked up today.\"  RN asks about his legs/wounds, he states it's \"sunburn.\"  RN asked pt. If he would allow staff to place an IV and give IVF, he refused.  RN asked if pt wanted something to drink or eat, he refused.  RN asked if pt's has friends/family who could come get him, he denies, although he has a Mother listed as primary and a sister and friend listed as well. RN assisted pt. Back into bed, and he fell back to sleep, SaO2 on room air (he removed O2) is 94%.

## 2024-07-30 NOTE — ED NOTES
Pt's Mother called, message left to call [facility] ED back, phone number left to call back.  Pt. Is awake, alert, oriented, drinking coffee.

## 2024-07-30 NOTE — ED NOTES
Pt. Continues to rest quietly, he is observed via video monitoring to be occasionally turning himself in bed, remains on 2 liters NC, SaO2 is 92-94%.

## 2024-10-25 ENCOUNTER — HOSPITAL ENCOUNTER (EMERGENCY)
Age: 49
Discharge: HOME | End: 2024-10-26

## 2024-10-25 ENCOUNTER — APPOINTMENT (OUTPATIENT)
Dept: CT IMAGING | Age: 49
End: 2024-10-25

## 2024-10-25 DIAGNOSIS — F10.920 ACUTE ALCOHOLIC INTOXICATION WITHOUT COMPLICATION (HCC): ICD-10-CM

## 2024-10-25 DIAGNOSIS — S01.81XA FACIAL LACERATION, INITIAL ENCOUNTER: ICD-10-CM

## 2024-10-25 DIAGNOSIS — S09.90XA INJURY OF HEAD, INITIAL ENCOUNTER: Primary | ICD-10-CM

## 2024-10-25 LAB
ANION GAP SERPL CALCULATED.3IONS-SCNC: 12 MEQ/L (ref 9–15)
BASOPHILS # BLD: 0 K/UL (ref 0–0.1)
BASOPHILS NFR BLD: 0.5 % (ref 0.2–1.2)
BUN SERPL-MCNC: 7 MG/DL (ref 6–20)
CALCIUM SERPL-MCNC: 9 MG/DL (ref 8.5–9.9)
CHLORIDE SERPL-SCNC: 100 MEQ/L (ref 95–107)
CO2 SERPL-SCNC: 30 MEQ/L (ref 20–31)
CREAT SERPL-MCNC: 0.83 MG/DL (ref 0.7–1.2)
EOSINOPHIL # BLD: 0 K/UL (ref 0–0.5)
EOSINOPHIL NFR BLD: 0.1 % (ref 0.8–7)
ERYTHROCYTE [DISTWIDTH] IN BLOOD BY AUTOMATED COUNT: 13.8 % (ref 11.6–14.4)
ETHANOL PERCENT: 0.37 G/DL
ETHANOLAMINE SERPL-MCNC: 420 MG/DL (ref 0–0.08)
GLUCOSE SERPL-MCNC: 130 MG/DL (ref 70–99)
HCT VFR BLD AUTO: 46.3 % (ref 42–52)
HGB BLD-MCNC: 15.9 G/DL (ref 13.7–17.5)
IMM GRANULOCYTES # BLD: 0 K/UL
IMM GRANULOCYTES NFR BLD: 0.1 %
LYMPHOCYTES # BLD: 1.5 K/UL (ref 1.3–3.6)
LYMPHOCYTES NFR BLD: 18.6 %
MCH RBC QN AUTO: 32.1 PG (ref 25.7–32.2)
MCHC RBC AUTO-ENTMCNC: 34.3 % (ref 32.3–36.5)
MCV RBC AUTO: 93.5 FL (ref 79–92.2)
MONOCYTES # BLD: 0.9 K/UL (ref 0.3–0.8)
MONOCYTES NFR BLD: 11 % (ref 5.3–12.2)
NEUTROPHILS # BLD: 5.4 K/UL (ref 1.8–5.4)
NEUTS SEG NFR BLD: 69.7 % (ref 34–67.9)
PLATELET # BLD AUTO: 331 K/UL (ref 163–337)
POTASSIUM SERPL-SCNC: 4.6 MEQ/L (ref 3.4–4.9)
RBC # BLD AUTO: 4.95 M/UL (ref 4.63–6.08)
SODIUM SERPL-SCNC: 142 MEQ/L (ref 135–144)
WBC # BLD AUTO: 7.8 K/UL (ref 4.2–9)

## 2024-10-25 PROCEDURE — 82077 ASSAY SPEC XCP UR&BREATH IA: CPT

## 2024-10-25 PROCEDURE — 70450 CT HEAD/BRAIN W/O DYE: CPT

## 2024-10-25 PROCEDURE — 2580000003 HC RX 258

## 2024-10-25 PROCEDURE — 90715 TDAP VACCINE 7 YRS/> IM: CPT

## 2024-10-25 PROCEDURE — 99284 EMERGENCY DEPT VISIT MOD MDM: CPT

## 2024-10-25 PROCEDURE — 80048 BASIC METABOLIC PNL TOTAL CA: CPT

## 2024-10-25 PROCEDURE — 85025 COMPLETE CBC W/AUTO DIFF WBC: CPT

## 2024-10-25 PROCEDURE — 90471 IMMUNIZATION ADMIN: CPT

## 2024-10-25 PROCEDURE — 6360000002 HC RX W HCPCS

## 2024-10-25 PROCEDURE — 36415 COLL VENOUS BLD VENIPUNCTURE: CPT

## 2024-10-25 RX ORDER — CEPHALEXIN 500 MG/1
500 CAPSULE ORAL 4 TIMES DAILY
Qty: 28 CAPSULE | Refills: 0 | Status: SHIPPED | OUTPATIENT
Start: 2024-10-25 | End: 2024-11-01

## 2024-10-25 RX ORDER — 0.9 % SODIUM CHLORIDE 0.9 %
1000 INTRAVENOUS SOLUTION INTRAVENOUS ONCE
Status: COMPLETED | OUTPATIENT
Start: 2024-10-25 | End: 2024-10-25

## 2024-10-25 RX ORDER — GINSENG 100 MG
CAPSULE ORAL ONCE
Status: DISCONTINUED | OUTPATIENT
Start: 2024-10-25 | End: 2024-10-26 | Stop reason: HOSPADM

## 2024-10-25 RX ORDER — MAGNESIUM HYDROXIDE 1200 MG/15ML
250 LIQUID ORAL ONCE
Status: DISCONTINUED | OUTPATIENT
Start: 2024-10-25 | End: 2024-10-26 | Stop reason: HOSPADM

## 2024-10-25 RX ORDER — LIDOCAINE HYDROCHLORIDE 10 MG/ML
5 INJECTION, SOLUTION INFILTRATION; PERINEURAL ONCE
Status: DISCONTINUED | OUTPATIENT
Start: 2024-10-25 | End: 2024-10-26 | Stop reason: HOSPADM

## 2024-10-25 RX ADMIN — TETANUS TOXOID, REDUCED DIPHTHERIA TOXOID AND ACELLULAR PERTUSSIS VACCINE, ADSORBED 0.5 ML: 5; 2.5; 8; 8; 2.5 SUSPENSION INTRAMUSCULAR at 18:34

## 2024-10-25 RX ADMIN — SODIUM CHLORIDE 1000 ML: 9 INJECTION, SOLUTION INTRAVENOUS at 18:33

## 2024-10-25 ASSESSMENT — PAIN DESCRIPTION - LOCATION: LOCATION: HEAD

## 2024-10-25 ASSESSMENT — ENCOUNTER SYMPTOMS
RESPIRATORY NEGATIVE: 1
BACK PAIN: 0
COLOR CHANGE: 1
SHORTNESS OF BREATH: 0

## 2024-10-25 ASSESSMENT — PAIN SCALES - GENERAL: PAINLEVEL_OUTOF10: 0

## 2024-10-25 ASSESSMENT — LIFESTYLE VARIABLES
HOW MANY STANDARD DRINKS CONTAINING ALCOHOL DO YOU HAVE ON A TYPICAL DAY: PATIENT UNABLE TO ANSWER
HOW OFTEN DO YOU HAVE A DRINK CONTAINING ALCOHOL: 4 OR MORE TIMES A WEEK

## 2024-10-25 ASSESSMENT — PAIN - FUNCTIONAL ASSESSMENT: PAIN_FUNCTIONAL_ASSESSMENT: NONE - DENIES PAIN

## 2024-10-25 NOTE — ED NOTES
Pt comes to the ED from home via [facility] for intox/fall. Per patient he is a daily drinker. He states he drinks 'too much' vodka. He fell onto the ground, hitting his face on the ground. Pt has a large laceration to his nose, small hematoma and abrasion to the right side of his face. Pt uncooperative on arrival to ED, does not want his VS taken. VS able to be taken by the EMS that brought him in. Pt does not want to be cared for but he is heavily intoxicated. He denies pain, nausea, or injury. He is alert and oriented.

## 2024-10-25 NOTE — ED NOTES
Pt removed his IV and put his clothes back on. Pt seen walking down the beck and stopped by staff. Pt states he wants to leave and does not want anything to be done for him. Pt redirected to his room. States he will go for a head CT.

## 2024-10-25 NOTE — ED PROVIDER NOTES
tried to ambulate out of the ER.  He was talked into staying by the charge nurse.  CBC: WBC 7.8 hemoglobin 15.9 hematocrit 46.3.  Platelets 331.  Ethanol levels 420.  BMP: Sodium 142 potassium 4.6 bicarb 30 BUN 7 creatinine 0.83.  Patient is refusing to allow suture repair of laceration to his nose.  CT head shows no acute intracranial abnormality small radiopaque foreign body possible chip fracture to the nasal bone.  After discussing this with the patient he will allow me to irrigate and explore the wound.  He is still refusing sutures.  Wound is irrigated with 80 mL of normal saline.  Full depth of the wound bed is explored there are no foreign bodies noted.  Patient did allow me to place Steri-Strips across the incision I was able to approximate well and bleeding was stopped.  Bacitracin and bandage was then applied over the Steri-Strips.  We are attempted to get a family member to come pick patient up and transfer him into her care.  Patient's mother refused there are no other friends or family available to contact.  Orders placed for repeat ethanol level at 6 AM.  Orders for Keflex 7-day course sent to patient's pharmacy [facility].    CT head without contrast    FINDINGS:     Brain:  No acute findings.  No hemorrhage.  No significant white matter  disease.  No edema.     Ventricles:  No acute findings.  No ventriculomegaly.     Bones/joints:  See below.     Soft tissues:  Soft tissue laceration involving the left anterior nose.  On  image 7 of series 3 there is a punctate density that could represent a small  radiopaque foreign body or a tiny chip fracture from the nasal bone.     Sinuses:  Unremarkable as visualized.  No acute sinusitis.     Mastoid air cells:  Unremarkable as visualized.  No mastoid effusion.     IMPRESSION:     1.  Soft tissue laceration involving the left anterior nose.  On image 7 of  series 3 there is a punctate density that could represent a small radiopaque  foreign body or  a tiny chip fracture from the nasal bone.     2.  No acute intracranial abnormality noted.       Pt signed out to Dr Seaman at 0963        PROCEDURES:  Unless otherwise noted below, none     Procedures    My attending is: Dr Starr       FINAL IMPRESSION      1. Injury of head, initial encounter    2. Facial laceration, initial encounter    3. Acute alcoholic intoxication without complication (HCC)          DISPOSITION/PLAN   DISPOSITION Decision To Discharge 10/26/2024 06:59:37 AM               FELIPA Renner CNP (electronically signed)  Attending Emergency Physician      Blake Denson APRN - CNP  10/26/24 1132

## 2024-10-25 NOTE — ED NOTES
Attempted to call patients mom for a ride home per his request. His mom states she cannot drive at night. She also states she does not want him at her house, she is afraid of him and that is why she called police in the first place.

## 2024-10-26 VITALS
TEMPERATURE: 98.2 F | DIASTOLIC BLOOD PRESSURE: 118 MMHG | WEIGHT: 173 LBS | BODY MASS INDEX: 26.22 KG/M2 | SYSTOLIC BLOOD PRESSURE: 192 MMHG | RESPIRATION RATE: 18 BRPM | OXYGEN SATURATION: 97 % | HEART RATE: 90 BPM | HEIGHT: 68 IN

## 2024-10-26 LAB
AMPHETAMINES UR QL SCN>500 NG/ML: NORMAL
BARBITURATES UR QL SCN>200 NG/ML: NORMAL
BENZODIAZ UR QL SCN: NORMAL
COCAINE UR QL SCN: NORMAL
DRUG SCREEN COMMENT UR-IMP: NORMAL
ETHANOL PERCENT: 0.05 G/DL
ETHANOLAMINE SERPL-MCNC: 60 MG/DL (ref 0–0.08)
OPIATES UR QL SCN: NORMAL
PCP UR QL SCN>25 NG/ML: NORMAL
THC UR QL SCN>50 NG/ML: NORMAL
TRICYCLICS UR QL SCN: NORMAL

## 2024-10-26 PROCEDURE — 36415 COLL VENOUS BLD VENIPUNCTURE: CPT

## 2024-10-26 PROCEDURE — 80306 DRUG TEST PRSMV INSTRMNT: CPT

## 2024-10-26 PROCEDURE — 82077 ASSAY SPEC XCP UR&BREATH IA: CPT

## 2024-10-26 NOTE — ED NOTES
Patient resting in chair quietly with eyes closed at this time. Equal chest rise and fall observed. No acute distress observed. Call light within reach. Safety maintained.

## 2024-10-26 NOTE — ED NOTES
Discharge instructions reviewed with patient.  Prescriptions and follow up discussed.  No questions at this time.  Patient ambulatory in stable condition with [facility] at discharge.  Patient's blood pressure elevated upon discharge.  Patient declines need for treatment for blood pressure at this time.  He denies chest pain or headache. He states his blood pressure is always high and he's used to it.

## 2024-10-26 NOTE — ED NOTES
Patient given sandwich and snacks. Recliner provided by nursing supervisor for patients comfort. Resting quietly, watching TV.

## 2024-10-26 NOTE — ED PROVIDER NOTES
Addendum (Dr. Dunn)  Patient was seen and cared for for alcohol intoxication lacerations set for discharge home with alcohol level improved to 60 on my arrival at 7 AM.  Patient sleeping arouses easily no complaints.  Patient up to the bathroom with steady gait.  Nursing staff did contact the patient's mother for a ride and was advised that she was not coming to get him and did not want him home.  Patient was notified and requested treatment for his drinking.  Nursing staff did contact [facility] who attended the patient in the emergency department and is excepting them into his program.  Repeat blood pressure check is elevated, patient states his blood pressure is always that high he has not taken his blood pressure medication and he does not know how long patient denies any headache or chest pain states he does not want acute blood pressure treatment just wants to go and get to the program.  Patient was discharged from emergency as planned, now going to the [facility].     Mirna Dunn, DO  10/26/24 1111

## 2024-10-26 NOTE — ED NOTES
Contacted [place] regarding patients ability to go back home. Per [employer] patient cannot go back home at this time, he will need to find somewhere else to go for the night.

## 2024-10-26 NOTE — ED NOTES
Spoke to patient regarding plan of discharge. Offered to assist with getting him treatment. He is agreeable and admits to knowing he needs treatment.